# Patient Record
Sex: MALE | Race: WHITE | NOT HISPANIC OR LATINO | URBAN - METROPOLITAN AREA
[De-identification: names, ages, dates, MRNs, and addresses within clinical notes are randomized per-mention and may not be internally consistent; named-entity substitution may affect disease eponyms.]

---

## 2020-02-08 ENCOUNTER — INPATIENT (INPATIENT)
Facility: HOSPITAL | Age: 85
LOS: 6 days | Discharge: HOME | End: 2020-02-15
Attending: INTERNAL MEDICINE | Admitting: INTERNAL MEDICINE
Payer: MEDICARE

## 2020-02-08 VITALS
OXYGEN SATURATION: 100 % | TEMPERATURE: 96 F | HEART RATE: 114 BPM | SYSTOLIC BLOOD PRESSURE: 81 MMHG | DIASTOLIC BLOOD PRESSURE: 54 MMHG | HEIGHT: 70 IN | RESPIRATION RATE: 17 BRPM | WEIGHT: 190.04 LBS

## 2020-02-08 LAB
ALBUMIN SERPL ELPH-MCNC: 4.6 G/DL — SIGNIFICANT CHANGE UP (ref 3.5–5.2)
ALP SERPL-CCNC: 80 U/L — SIGNIFICANT CHANGE UP (ref 30–115)
ALT FLD-CCNC: 12 U/L — SIGNIFICANT CHANGE UP (ref 0–41)
ANION GAP SERPL CALC-SCNC: 15 MMOL/L — HIGH (ref 7–14)
APPEARANCE UR: CLEAR — SIGNIFICANT CHANGE UP
APTT BLD: 23.8 SEC — CRITICAL LOW (ref 27–39.2)
AST SERPL-CCNC: 18 U/L — SIGNIFICANT CHANGE UP (ref 0–41)
BASOPHILS # BLD AUTO: 0.02 K/UL — SIGNIFICANT CHANGE UP (ref 0–0.2)
BASOPHILS NFR BLD AUTO: 0.2 % — SIGNIFICANT CHANGE UP (ref 0–1)
BILIRUB SERPL-MCNC: 1.1 MG/DL — SIGNIFICANT CHANGE UP (ref 0.2–1.2)
BILIRUB UR-MCNC: NEGATIVE — SIGNIFICANT CHANGE UP
BUN SERPL-MCNC: 17 MG/DL — SIGNIFICANT CHANGE UP (ref 10–20)
CALCIUM SERPL-MCNC: 9.3 MG/DL — SIGNIFICANT CHANGE UP (ref 8.5–10.1)
CHLORIDE SERPL-SCNC: 104 MMOL/L — SIGNIFICANT CHANGE UP (ref 98–110)
CO2 SERPL-SCNC: 25 MMOL/L — SIGNIFICANT CHANGE UP (ref 17–32)
COLOR SPEC: YELLOW — SIGNIFICANT CHANGE UP
CREAT SERPL-MCNC: 1.1 MG/DL — SIGNIFICANT CHANGE UP (ref 0.7–1.5)
DIFF PNL FLD: NEGATIVE — SIGNIFICANT CHANGE UP
EOSINOPHIL # BLD AUTO: 0.02 K/UL — SIGNIFICANT CHANGE UP (ref 0–0.7)
EOSINOPHIL NFR BLD AUTO: 0.2 % — SIGNIFICANT CHANGE UP (ref 0–8)
FLU A RESULT: NEGATIVE — SIGNIFICANT CHANGE UP
FLU A RESULT: NEGATIVE — SIGNIFICANT CHANGE UP
FLUAV AG NPH QL: NEGATIVE — SIGNIFICANT CHANGE UP
FLUBV AG NPH QL: NEGATIVE — SIGNIFICANT CHANGE UP
GLUCOSE SERPL-MCNC: 166 MG/DL — HIGH (ref 70–99)
GLUCOSE UR QL: NEGATIVE MG/DL — SIGNIFICANT CHANGE UP
HCT VFR BLD CALC: 43.7 % — SIGNIFICANT CHANGE UP (ref 42–52)
HGB BLD-MCNC: 14.7 G/DL — SIGNIFICANT CHANGE UP (ref 14–18)
IMM GRANULOCYTES NFR BLD AUTO: 0.2 % — SIGNIFICANT CHANGE UP (ref 0.1–0.3)
INR BLD: 1.11 RATIO — SIGNIFICANT CHANGE UP (ref 0.65–1.3)
KETONES UR-MCNC: 15
LACTATE SERPL-SCNC: 2.2 MMOL/L — HIGH (ref 0.7–2)
LEUKOCYTE ESTERASE UR-ACNC: NEGATIVE — SIGNIFICANT CHANGE UP
LYMPHOCYTES # BLD AUTO: 1.22 K/UL — SIGNIFICANT CHANGE UP (ref 1.2–3.4)
LYMPHOCYTES # BLD AUTO: 11.6 % — LOW (ref 20.5–51.1)
MCHC RBC-ENTMCNC: 32.5 PG — HIGH (ref 27–31)
MCHC RBC-ENTMCNC: 33.6 G/DL — SIGNIFICANT CHANGE UP (ref 32–37)
MCV RBC AUTO: 96.5 FL — HIGH (ref 80–94)
MONOCYTES # BLD AUTO: 0.7 K/UL — HIGH (ref 0.1–0.6)
MONOCYTES NFR BLD AUTO: 6.7 % — SIGNIFICANT CHANGE UP (ref 1.7–9.3)
NEUTROPHILS # BLD AUTO: 8.52 K/UL — HIGH (ref 1.4–6.5)
NEUTROPHILS NFR BLD AUTO: 81.1 % — HIGH (ref 42.2–75.2)
NITRITE UR-MCNC: NEGATIVE — SIGNIFICANT CHANGE UP
NRBC # BLD: 0 /100 WBCS — SIGNIFICANT CHANGE UP (ref 0–0)
PH UR: 5.5 — SIGNIFICANT CHANGE UP (ref 5–8)
PLATELET # BLD AUTO: 152 K/UL — SIGNIFICANT CHANGE UP (ref 130–400)
POTASSIUM SERPL-MCNC: 3.8 MMOL/L — SIGNIFICANT CHANGE UP (ref 3.5–5)
POTASSIUM SERPL-SCNC: 3.8 MMOL/L — SIGNIFICANT CHANGE UP (ref 3.5–5)
PROT SERPL-MCNC: 7.2 G/DL — SIGNIFICANT CHANGE UP (ref 6–8)
PROT UR-MCNC: NEGATIVE MG/DL — SIGNIFICANT CHANGE UP
PROTHROM AB SERPL-ACNC: 12.8 SEC — SIGNIFICANT CHANGE UP (ref 9.95–12.87)
RBC # BLD: 4.53 M/UL — LOW (ref 4.7–6.1)
RBC # FLD: 12.6 % — SIGNIFICANT CHANGE UP (ref 11.5–14.5)
RSV RESULT: NEGATIVE — SIGNIFICANT CHANGE UP
RSV RNA RESP QL NAA+PROBE: NEGATIVE — SIGNIFICANT CHANGE UP
SODIUM SERPL-SCNC: 144 MMOL/L — SIGNIFICANT CHANGE UP (ref 135–146)
SP GR SPEC: 1.02 — SIGNIFICANT CHANGE UP (ref 1.01–1.03)
TROPONIN T SERPL-MCNC: <0.01 NG/ML — SIGNIFICANT CHANGE UP
UROBILINOGEN FLD QL: 1 MG/DL (ref 0.2–0.2)
WBC # BLD: 10.5 K/UL — SIGNIFICANT CHANGE UP (ref 4.8–10.8)
WBC # FLD AUTO: 10.5 K/UL — SIGNIFICANT CHANGE UP (ref 4.8–10.8)

## 2020-02-08 PROCEDURE — 99291 CRITICAL CARE FIRST HOUR: CPT

## 2020-02-08 RX ORDER — AZITHROMYCIN 500 MG/1
500 TABLET, FILM COATED ORAL ONCE
Refills: 0 | Status: COMPLETED | OUTPATIENT
Start: 2020-02-08 | End: 2020-02-08

## 2020-02-08 RX ORDER — CEFTRIAXONE 500 MG/1
1000 INJECTION, POWDER, FOR SOLUTION INTRAMUSCULAR; INTRAVENOUS ONCE
Refills: 0 | Status: COMPLETED | OUTPATIENT
Start: 2020-02-08 | End: 2020-02-08

## 2020-02-08 RX ORDER — ACETAMINOPHEN 500 MG
650 TABLET ORAL ONCE
Refills: 0 | Status: COMPLETED | OUTPATIENT
Start: 2020-02-08 | End: 2020-02-08

## 2020-02-08 RX ORDER — SODIUM CHLORIDE 9 MG/ML
2300 INJECTION, SOLUTION INTRAVENOUS ONCE
Refills: 0 | Status: COMPLETED | OUTPATIENT
Start: 2020-02-08 | End: 2020-02-08

## 2020-02-08 RX ORDER — SODIUM CHLORIDE 9 MG/ML
1000 INJECTION, SOLUTION INTRAVENOUS
Refills: 0 | Status: DISCONTINUED | OUTPATIENT
Start: 2020-02-08 | End: 2020-02-09

## 2020-02-08 RX ADMIN — Medication 650 MILLIGRAM(S): at 22:33

## 2020-02-08 RX ADMIN — SODIUM CHLORIDE 2300 MILLILITER(S): 9 INJECTION, SOLUTION INTRAVENOUS at 21:15

## 2020-02-08 RX ADMIN — AZITHROMYCIN 255 MILLIGRAM(S): 500 TABLET, FILM COATED ORAL at 22:57

## 2020-02-08 RX ADMIN — CEFTRIAXONE 1000 MILLIGRAM(S): 500 INJECTION, POWDER, FOR SOLUTION INTRAMUSCULAR; INTRAVENOUS at 22:42

## 2020-02-08 NOTE — ED PROVIDER NOTE - SEVERE SEPSIS ALERT DETAILS
Sepsis suspected at this time.   IVF bolus cannot be given due to: ( ) CHF ( ) CRF ( ) Hospice or comfort measures only ( ) Patient refusal

## 2020-02-08 NOTE — ED PROVIDER NOTE - PROGRESS NOTE DETAILS
Discussed with Dr. Goodman. Admit to low risk tele. Dr. Kingsley accepts admission. Dr Purvis evaluated patient, patient doesn't need ICU now. admit to low risk tele. patient has low CHAVAD score and doesn't recommend anticoagulation now. treat PNA with Abx and IV fluid. will re-evaluate patient if condition deteriorate noted on monitor patient's A.fib broke. ? elevation will order EKG EKG found STEMI. STEMI activated. case d/w Dr Duarte (Cardiology), accept to CCU. case d/w Dr Wagner Herbster hospitalist and aware of transfer and admission. Dr Kingsley at bedside and aware of the transfer and STEMI.

## 2020-02-08 NOTE — ED PROVIDER NOTE - NS ED ROS FT
Constitutional: + chills and lightheadedness. no fever, no recent weight loss, change in appetite or malaise  Eyes: no redness/discharge/pain/vision changes  ENT: no rhinorrhea/ear pain/sore throat  Cardiac: No chest pain, SOB or edema.  Respiratory: No cough or respiratory distress  GI: ?diarrhea No nausea, vomiting or abdominal pain.  : No dysuria, frequency, urgency or hematuria  MS: no pain to back or extremities, no loss of ROM, no weakness  Neuro: No headache or weakness. No LOC.  Skin: No skin rash.  Endocrine: No history of thyroid disease or diabetes.

## 2020-02-08 NOTE — CONSULT NOTE ADULT - SUBJECTIVE AND OBJECTIVE BOX
Patient is a 85y old  Male who presents with a chief complaint of     HPI: 85M with no PMH presents for new onset rigors. Patient lives in [place] and is fully functional and takes care of all ADL without restrictions. He states that he has been having a mild cough over the last few days. Denies chest pain, dyspnea, fever, abdominal discomfort or other constitutional symptoms. He does not use any medications. Has no cardiac history. Upon admission he was tachycardic and with rigors. He was given 2L LR in the ED and ABx Rocephin and Azithromycin.  85y (2020 23:00)      PAST MEDICAL & SURGICAL HISTORY:      SOCIAL HX:   Smoking     former smoker                    ETOH   denies                         Other    FAMILY HISTORY:  :  No known cardiovascular family history     Review Of Systems:     CONSTITUTIONAL:   no fever   + chills.  no weight gain   no weight loss    EYES:   no discharge,   no pain  no redness,   no visual changes.    ENT:   Ears: no ear pain and no hearing problems.  Nose: no nasal congestion and no nasal drainage.  Mouth/Throat: no dysphagia,  no hoarseness and no throat pain.  Neck: no lumps, no pain, no stiffness and no swollen glands.     CARDIOVASCULAR:   no chest pain,   no swelling  no palpitations  no syncope    RESPIRATORY:  no SOB,  no wheezing ,  no respiratory difficulty  no sputum production  + cough    GASTROINTESTINAL:   no abdominal pain,   no constipation,   no diarrhea,   no vomiting.    GENITOURINARY:  no dysuria,   no frequency,   no urgency  no hematuria.    MUSCULOSKELETAL:   no back pain,   no musculoskeletal pain,  no weakness.    SKIN:   no jaundice,   no lesions,   no pruritis,   no rashes.    NEURO:   no loss of consciousness,   no gait abnormality,   no headache,   no sensory deficits,   no weakness.    PSYCHIATRIC:   no known mental health issues  no anxiety  no depression    ALLERGIC/IMMUNOLOGIC:   No active allergic or immunologic issues      Allergies    No Known Allergies    Intolerances          PHYSICAL EXAM    ICU Vital Signs Last 24 Hrs  T(C): 37.4 (2020 22:13), Max: 37.4 (2020 22:13)  T(F): 99.3 (2020 22:13), Max: 99.3 (2020 22:13)  HR: 122 (2020 22:13) (114 - 122)  BP: 111/62 (2020 22:13) (81/54 - 111/62)  RR: 20 (2020 22:13) (17 - 20)  SpO2: 95% (2020 22:13) (95% - 100%)      CONSTITUTIONAL:   Ill appearing.  Well nourished.  NAD    ENT:   + ET   Airway patent,   Mouth with normal mucosa.   No thrush    EYES:   pupils equal,   round and reactive to light.    CARDIAC:   Tachycardic   Irregular rhythm.    Heart sounds S1, S2.   No edema      Vascular:   normal systolic impulse  no bruits    RESPIRATORY:   No wheezing   Normal chest expansion  No use of accessory muscles    GASTROINTESTINAL:  Abdomen soft   Non-tender,   No guarding,   + BS    GENITOURINARY  normal genitalia for sex  no edema    MUSCULOSKELETAL:   Range of motion is not limited,  No clubbing, cyanosis    NEUROLOGICAL:   Alert and oriented   No motor or sensory deficits.  Pertinent DTRs normal    SKIN:   Skin normal color for race,   Warm and dry  No evidence of rash.    PSYCHIATRIC:   Normal mood and affect.   No apparent risk to self or others.    HEME LYMPH:   No splenomegaly.  No cervical  lymphadenopathy.  No inguinal lymphadenopathy              LABS:                          14.7   10.50 )-----------( 152      ( 2020 21:13 )             43.7                                               02-08    144  |  104  |  17  ----------------------------<  166<H>  3.8   |  25  |  1.1    Ca    9.3      2020 21:13    TPro  7.2  /  Alb  4.6  /  TBili  1.1  /  DBili  x   /  AST  18  /  ALT  12  /  AlkPhos  80  02-08      PT/INR - ( 2020 21:13 )   PT: 12.80 sec;   INR: 1.11 ratio         PTT - ( 2020 21:13 )  PTT:23.8 sec                                       Urinalysis Basic - ( 2020 23:00 )    Color: Yellow / Appearance: Clear / S.025 / pH: x  Gluc: x / Ketone: 15  / Bili: Negative / Urobili: 1.0 mg/dL   Blood: x / Protein: Negative mg/dL / Nitrite: Negative   Leuk Esterase: Negative / RBC: x / WBC x   Sq Epi: x / Non Sq Epi: x / Bacteria: x        CARDIAC MARKERS ( 2020 23:00 )  x     / <0.01 ng/mL / x     / x     / x                                                LIVER FUNCTIONS - ( 2020 21:13 )  Alb: 4.6 g/dL / Pro: 7.2 g/dL / ALK PHOS: 80 U/L / ALT: 12 U/L / AST: 18 U/L / GGT: x                                                                                                                                       X-Rays reviewed:                                                                                    ECHO    CXR interpreted by me: RLL opacity    MEDICATIONS  (STANDING):  lactated ringers. 1000 milliLiter(s) (2000 mL/Hr) IV Continuous <Continuous>    MEDICATIONS  (PRN):

## 2020-02-08 NOTE — ED ADULT NURSE NOTE - NSIMPLEMENTINTERV_GEN_ALL_ED
Implemented All Fall Risk Interventions:  [place] to call system. Call bell, personal items and telephone within reach. Instruct patient to call for assistance. Room bathroom lighting operational. Non-slip footwear when patient is off stretcher. Physically safe environment: no spills, clutter or unnecessary equipment. Stretcher in lowest position, wheels locked, appropriate side rails in place. Provide visual cue, wrist band, yellow gown, etc. Monitor gait and stability. Monitor for mental status changes and reorient to person, place, and time. Review medications for side effects contributing to fall risk. Reinforce activity limits and safety measures with patient and family.

## 2020-02-08 NOTE — ED PROVIDER NOTE - CLINICAL SUMMARY MEDICAL DECISION MAKING FREE TEXT BOX
Labs unremarkable. CXR RLL infiltrate. EKG afib with RVR, no acute changes. Given IVF, Rocephin and Zofran. Will admit to low risk tele.

## 2020-02-08 NOTE — H&P ADULT - HISTORY OF PRESENT ILLNESS
16p 86yo male traveling from [place] presented to ER with chill. In the ER found to have rapid atrial fibrillationalong with a pneumonia 86yo male traveling from [place] presented to ER with chill. In the ER found to have rapid atrial fibrillationalong with a pneumonia. No chest pain 86yo male traveling from [place] presented to ER with chill. In the ER found to have rapid atrial fibrillationalong. No chest pain

## 2020-02-08 NOTE — ED PROVIDER NOTE - OBJECTIVE STATEMENT
84 yo male no sig hx present c/o "shaking chills" x 1 hour started around dinner time. as per patient and son, patient was doing well earlier today. They were out of dinner and patient started complaining chills and lightheaded. Denies LOC/chest pain/sob/abd pain/n/v/HA and dizziness. In ED, patient reported he feels like he has to go for diarrhea. patient denies coughing/sob/sore throat/ear pain and runny nose.

## 2020-02-08 NOTE — ED PROVIDER NOTE - ATTENDING CONTRIBUTION TO CARE
I personally evaluated the patient. I reviewed the Resident’s or Physician Assistant’s note (as assigned above), and agree with the findings and plan except as documented in my note.  Chart reviewed. 84 y/o WM brought in for shaking chills and nausea. No rash or headache. Exam shows alert patient in no distress, HEENT NCAT, neck supple, lungs clear, RR S1S2, abdomen soft Nt +BS, no CCE, no rash, neuro A&OX3 no deficits.

## 2020-02-08 NOTE — CONSULT NOTE ADULT - ASSESSMENT
IMPRESSION:    Sepsis   PNA  Afib with RVR - resolved  r/o bacteremia      PLAN:    CNS: Avoid CNS depressants    HEENT: Oral care    PULMONARY:  HOB @ 45 degrees. Maintain SpO2 > 92%    CARDIOVASCULAR: Cardiology consult. F/u CE, Check 2D echo    GI: GI prophylaxis.  Feeding as tolerated. Consider speech and swallow evaluation    RENAL:  Follow up lytes.  Correct as needed    INFECTIOUS DISEASE: Follow up cultures. Continue rocephin, azithromycin    HEMATOLOGICAL:  DVT prophylaxis.    ENDOCRINE:  Follow up FS.  Insulin protocol if needed.    MUSCULOSKELETAL: OOB as tolerated    Patient does not require continuous monitoring in the ICU at this time. Would recommend admission to telemetry. Recall as needed.

## 2020-02-08 NOTE — ED PROVIDER NOTE - CARE PLAN
Principal Discharge DX:	Pneumonia Principal Discharge DX:	Pneumonia  Secondary Diagnosis:	Atrial fibrillation with rapid ventricular response

## 2020-02-08 NOTE — H&P ADULT - NSHPLABSRESULTS_GEN_ALL_CORE
14.7   10.50 )-----------( 152      ( 2020 21:13 )             43.7         144  |  104  |  17  ----------------------------<  166<H>  3.8   |  25  |  1.1    Ca    9.3      2020 21:13    TPro  7.2  /  Alb  4.6  /  TBili  1.1  /  DBili  x   /  AST  18  /  ALT  12  /  AlkPhos  80  02-08          Urinalysis Basic - ( 2020 23:00 )    Color: Yellow / Appearance: Clear / S.025 / pH: x  Gluc: x / Ketone: 15  / Bili: Negative / Urobili: 1.0 mg/dL   Blood: x / Protein: Negative mg/dL / Nitrite: Negative   Leuk Esterase: Negative / RBC: x / WBC x   Sq Epi: x / Non Sq Epi: x / Bacteria: x      PT/INR - ( 2020 21:13 )   PT: 12.80 sec;   INR: 1.11 ratio         PTT - ( 2020 21:13 )  PTT:23.8 sec  Lactate Trend   @ 21:13 Lactate:2.2     CARDIAC MARKERS ( 2020 23:00 )  x     / <0.01 ng/mL / x     / x     / x          CAPILLARY BLOOD GLUCOSE      EKG- rapid a fib    CXR per ER RLL pnumonia

## 2020-02-09 ENCOUNTER — EMERGENCY (EMERGENCY)
Facility: HOSPITAL | Age: 85
LOS: 0 days | Discharge: HOME | End: 2020-02-09
Admitting: EMERGENCY MEDICINE
Payer: COMMERCIAL

## 2020-02-09 DIAGNOSIS — I21.9 ACUTE MYOCARDIAL INFARCTION, UNSPECIFIED: ICD-10-CM

## 2020-02-09 DIAGNOSIS — J18.9 PNEUMONIA, UNSPECIFIED ORGANISM: ICD-10-CM

## 2020-02-09 DIAGNOSIS — I48.91 UNSPECIFIED ATRIAL FIBRILLATION: ICD-10-CM

## 2020-02-09 LAB
ANION GAP SERPL CALC-SCNC: 13 MMOL/L — SIGNIFICANT CHANGE UP (ref 7–14)
BASE EXCESS BLDV CALC-SCNC: -0.5 MMOL/L — SIGNIFICANT CHANGE UP (ref -2–2)
BASOPHILS # BLD AUTO: 0.01 K/UL — SIGNIFICANT CHANGE UP (ref 0–0.2)
BASOPHILS NFR BLD AUTO: 0.1 % — SIGNIFICANT CHANGE UP (ref 0–1)
BUN SERPL-MCNC: 15 MG/DL — SIGNIFICANT CHANGE UP (ref 10–20)
CA-I SERPL-SCNC: 1.16 MMOL/L — SIGNIFICANT CHANGE UP (ref 1.12–1.3)
CALCIUM SERPL-MCNC: 8.6 MG/DL — SIGNIFICANT CHANGE UP (ref 8.5–10.1)
CHLORIDE SERPL-SCNC: 104 MMOL/L — SIGNIFICANT CHANGE UP (ref 98–110)
CHOLEST SERPL-MCNC: 119 MG/DL — SIGNIFICANT CHANGE UP (ref 100–200)
CK MB CFR SERPL CALC: 198.8 NG/ML — HIGH (ref 0.6–6.3)
CK SERPL-CCNC: 1547 U/L — HIGH (ref 0–225)
CO2 SERPL-SCNC: 25 MMOL/L — SIGNIFICANT CHANGE UP (ref 17–32)
CREAT SERPL-MCNC: 1 MG/DL — SIGNIFICANT CHANGE UP (ref 0.7–1.5)
EOSINOPHIL # BLD AUTO: 0 K/UL — SIGNIFICANT CHANGE UP (ref 0–0.7)
EOSINOPHIL NFR BLD AUTO: 0 % — SIGNIFICANT CHANGE UP (ref 0–8)
GAS PNL BLDV: 141 MMOL/L — SIGNIFICANT CHANGE UP (ref 136–145)
GAS PNL BLDV: SIGNIFICANT CHANGE UP
GLUCOSE SERPL-MCNC: 131 MG/DL — HIGH (ref 70–99)
HCO3 BLDV-SCNC: 23 MMOL/L — SIGNIFICANT CHANGE UP (ref 22–29)
HCT VFR BLD CALC: 38.6 % — LOW (ref 42–52)
HCT VFR BLDA CALC: 63.3 % — HIGH (ref 34–44)
HDLC SERPL-MCNC: 39 MG/DL — LOW
HGB BLD CALC-MCNC: 20.7 G/DL — CRITICAL HIGH (ref 14–18)
HGB BLD-MCNC: 12.7 G/DL — LOW (ref 14–18)
HOROWITZ INDEX BLDV+IHG-RTO: 21 — SIGNIFICANT CHANGE UP
IMM GRANULOCYTES NFR BLD AUTO: 0.3 % — SIGNIFICANT CHANGE UP (ref 0.1–0.3)
LACTATE BLDV-MCNC: 3.6 MMOL/L — HIGH (ref 0.5–1.6)
LIPID PNL WITH DIRECT LDL SERPL: 75 MG/DL — SIGNIFICANT CHANGE UP (ref 4–129)
LYMPHOCYTES # BLD AUTO: 1.76 K/UL — SIGNIFICANT CHANGE UP (ref 1.2–3.4)
LYMPHOCYTES # BLD AUTO: 15.1 % — LOW (ref 20.5–51.1)
MAGNESIUM SERPL-MCNC: 1.9 MG/DL — SIGNIFICANT CHANGE UP (ref 1.8–2.4)
MCHC RBC-ENTMCNC: 32.3 PG — HIGH (ref 27–31)
MCHC RBC-ENTMCNC: 32.9 G/DL — SIGNIFICANT CHANGE UP (ref 32–37)
MCV RBC AUTO: 98.2 FL — HIGH (ref 80–94)
MONOCYTES # BLD AUTO: 0.73 K/UL — HIGH (ref 0.1–0.6)
MONOCYTES NFR BLD AUTO: 6.3 % — SIGNIFICANT CHANGE UP (ref 1.7–9.3)
NEUTROPHILS # BLD AUTO: 9.12 K/UL — HIGH (ref 1.4–6.5)
NEUTROPHILS NFR BLD AUTO: 78.2 % — HIGH (ref 42.2–75.2)
NRBC # BLD: 0 /100 WBCS — SIGNIFICANT CHANGE UP (ref 0–0)
NT-PROBNP SERPL-SCNC: 444 PG/ML — HIGH (ref 0–300)
PCO2 BLDV: 36 MMHG — LOW (ref 41–51)
PH BLDV: 7.42 — SIGNIFICANT CHANGE UP (ref 7.26–7.43)
PLATELET # BLD AUTO: 115 K/UL — LOW (ref 130–400)
PO2 BLDV: 41 MMHG — HIGH (ref 20–40)
POTASSIUM BLDV-SCNC: 3.5 MMOL/L — SIGNIFICANT CHANGE UP (ref 3.3–5.6)
POTASSIUM SERPL-MCNC: 3.7 MMOL/L — SIGNIFICANT CHANGE UP (ref 3.5–5)
POTASSIUM SERPL-SCNC: 3.7 MMOL/L — SIGNIFICANT CHANGE UP (ref 3.5–5)
RBC # BLD: 3.93 M/UL — LOW (ref 4.7–6.1)
RBC # FLD: 12.7 % — SIGNIFICANT CHANGE UP (ref 11.5–14.5)
SAO2 % BLDV: 77 % — SIGNIFICANT CHANGE UP
SODIUM SERPL-SCNC: 142 MMOL/L — SIGNIFICANT CHANGE UP (ref 135–146)
TOTAL CHOLESTEROL/HDL RATIO MEASUREMENT: 3.1 RATIO — LOW (ref 4–5.5)
TRIGL SERPL-MCNC: 59 MG/DL — SIGNIFICANT CHANGE UP (ref 10–149)
TROPONIN T SERPL-MCNC: 0.06 NG/ML — CRITICAL HIGH
TROPONIN T SERPL-MCNC: 2.5 NG/ML — CRITICAL HIGH
WBC # BLD: 11.66 K/UL — HIGH (ref 4.8–10.8)
WBC # FLD AUTO: 11.66 K/UL — HIGH (ref 4.8–10.8)

## 2020-02-09 PROCEDURE — 99222 1ST HOSP IP/OBS MODERATE 55: CPT

## 2020-02-09 PROCEDURE — 93306 TTE W/DOPPLER COMPLETE: CPT | Mod: 26

## 2020-02-09 PROCEDURE — L9996: CPT

## 2020-02-09 PROCEDURE — 93010 ELECTROCARDIOGRAM REPORT: CPT

## 2020-02-09 PROCEDURE — 99231 SBSQ HOSP IP/OBS SF/LOW 25: CPT

## 2020-02-09 PROCEDURE — 71045 X-RAY EXAM CHEST 1 VIEW: CPT | Mod: 26

## 2020-02-09 RX ORDER — ASPIRIN/CALCIUM CARB/MAGNESIUM 324 MG
364 TABLET ORAL ONCE
Refills: 0 | Status: DISCONTINUED | OUTPATIENT
Start: 2020-02-09 | End: 2020-02-09

## 2020-02-09 RX ORDER — ASPIRIN/CALCIUM CARB/MAGNESIUM 324 MG
81 TABLET ORAL DAILY
Refills: 0 | Status: DISCONTINUED | OUTPATIENT
Start: 2020-02-09 | End: 2020-02-15

## 2020-02-09 RX ORDER — ASPIRIN/CALCIUM CARB/MAGNESIUM 324 MG
324 TABLET ORAL ONCE
Refills: 0 | Status: COMPLETED | OUTPATIENT
Start: 2020-02-09 | End: 2020-02-09

## 2020-02-09 RX ORDER — INFLUENZA VIRUS VACCINE 15; 15; 15; 15 UG/.5ML; UG/.5ML; UG/.5ML; UG/.5ML
0.5 SUSPENSION INTRAMUSCULAR ONCE
Refills: 0 | Status: DISCONTINUED | OUTPATIENT
Start: 2020-02-09 | End: 2020-02-09

## 2020-02-09 RX ORDER — SODIUM CHLORIDE 9 MG/ML
1000 INJECTION INTRAMUSCULAR; INTRAVENOUS; SUBCUTANEOUS
Refills: 0 | Status: DISCONTINUED | OUTPATIENT
Start: 2020-02-09 | End: 2020-02-14

## 2020-02-09 RX ORDER — AZITHROMYCIN 500 MG/1
500 TABLET, FILM COATED ORAL EVERY 24 HOURS
Refills: 0 | Status: DISCONTINUED | OUTPATIENT
Start: 2020-02-09 | End: 2020-02-09

## 2020-02-09 RX ORDER — CEFTRIAXONE 500 MG/1
1000 INJECTION, POWDER, FOR SOLUTION INTRAMUSCULAR; INTRAVENOUS EVERY 24 HOURS
Refills: 0 | Status: DISCONTINUED | OUTPATIENT
Start: 2020-02-09 | End: 2020-02-09

## 2020-02-09 RX ORDER — CLOPIDOGREL BISULFATE 75 MG/1
300 TABLET, FILM COATED ORAL ONCE
Refills: 0 | Status: COMPLETED | OUTPATIENT
Start: 2020-02-09 | End: 2020-02-09

## 2020-02-09 RX ORDER — CHLORHEXIDINE GLUCONATE 213 G/1000ML
1 SOLUTION TOPICAL
Refills: 0 | Status: DISCONTINUED | OUTPATIENT
Start: 2020-02-09 | End: 2020-02-15

## 2020-02-09 RX ORDER — ATORVASTATIN CALCIUM 80 MG/1
80 TABLET, FILM COATED ORAL AT BEDTIME
Refills: 0 | Status: DISCONTINUED | OUTPATIENT
Start: 2020-02-09 | End: 2020-02-15

## 2020-02-09 RX ORDER — CLOPIDOGREL BISULFATE 75 MG/1
75 TABLET, FILM COATED ORAL DAILY
Refills: 0 | Status: DISCONTINUED | OUTPATIENT
Start: 2020-02-09 | End: 2020-02-15

## 2020-02-09 RX ADMIN — ATORVASTATIN CALCIUM 80 MILLIGRAM(S): 80 TABLET, FILM COATED ORAL at 06:37

## 2020-02-09 RX ADMIN — CHLORHEXIDINE GLUCONATE 1 APPLICATION(S): 213 SOLUTION TOPICAL at 06:39

## 2020-02-09 RX ADMIN — SODIUM CHLORIDE 2000 MILLILITER(S): 9 INJECTION, SOLUTION INTRAVENOUS at 00:40

## 2020-02-09 RX ADMIN — Medication 324 MILLIGRAM(S): at 02:21

## 2020-02-09 RX ADMIN — SODIUM CHLORIDE 50 MILLILITER(S): 9 INJECTION INTRAMUSCULAR; INTRAVENOUS; SUBCUTANEOUS at 04:23

## 2020-02-09 RX ADMIN — CLOPIDOGREL BISULFATE 300 MILLIGRAM(S): 75 TABLET, FILM COATED ORAL at 02:21

## 2020-02-09 RX ADMIN — CLOPIDOGREL BISULFATE 75 MILLIGRAM(S): 75 TABLET, FILM COATED ORAL at 11:55

## 2020-02-09 RX ADMIN — Medication 650 MILLIGRAM(S): at 01:00

## 2020-02-09 RX ADMIN — Medication 81 MILLIGRAM(S): at 11:55

## 2020-02-09 RX ADMIN — ATORVASTATIN CALCIUM 80 MILLIGRAM(S): 80 TABLET, FILM COATED ORAL at 21:37

## 2020-02-09 NOTE — CONSULT NOTE ADULT - SUBJECTIVE AND OBJECTIVE BOX
Date of Admission: 02-09-20    CHIEF COMPLAINT: Patient is a 85y old  Male who presents with a chief complaint of     HPI:  86 y/o M with no PMH presents for new onset rigors. Patient lives in [place] and is fully functional and takes care of all ADL without restrictions. He states that he has been having a mild cough over the last few days. Denies chest pain, dyspnea, fever, abdominal discomfort or other constitutional symptoms. He does not use any medications. Has no cardiac history. Upon admission he was tachycardic and with rigors. He was given 2L LR in the ED and ABx Rocephin and Azithromycin.       PAST MEDICAL & SURGICAL HISTORY:  No pertinent past medical history  No significant past surgical history      FAMILY HISTORY:  [x] no pertinent family history of premature cardiovascular disease in first degree relatives.    SOCIAL HISTORY:    [x] Non-smoker  [x] No alcohol use  [x] No illicit drug use    Allergies: No Known Allergies    REVIEW OF SYSTEMS:  CONSTITUTIONAL: No fever, weight loss, or fatigue. (+) Rigors.  CARDIOLOGY: No chest pain, dyspnea of exertion, or syncopal episodes.   RESPIRATORY: (+) Cough. No shortness of breath, cough, wheezing.   NEUROLOGICAL: No weakness, no focal deficits to report.  GI: No BRBPR, no N,V,diarrhea.    PSYCHIATRY: Normal mood and affect.  HEENT: No nasal discharge, no ecchymosis  SKIN: No ecchymosis, no breakdown  MUSCULOSKELETAL: Full range of motion x4.   EXTREM: No leg swelling or erythema.    PHYSICAL EXAM:  T(C): 37.4 (02-08-20 @ 22:13), Max: 37.4 (02-08-20 @ 22:13)  HR: 122 (02-08-20 @ 22:13) (114 - 122)  BP: 111/62 (02-08-20 @ 22:13) (81/54 - 111/62)  RR: 20 (02-08-20 @ 22:13) (17 - 20)  SpO2: 95% (02-08-20 @ 22:13) (95% - 100%)  Wt(kg): --  I&O's Summary      General Appearance: Well appearing, normal for age and gender. 	  Neck: Normal JVP, no bruit.   Eyes: No xanthomalasia, Extra Ocular muscles intact.   Cardiovascular: Regular rate and rhythm S1 S2, No JVD, No murmurs.  Respiratory: Lungs clear to auscultation. No wheezes, rales or rhonchi.  Psychiatry: Alert and oriented x 3, Mood & affect appropriate  Gastrointestinal:  Soft, Non-tender  Skin/Integumen: No rashes, No ecchymoses, No cyanosis	  Neurologic: Non-focal deficits.  Musculoskeletal/ extremities: Normal range of motion, No clubbing, cyanosis or edema  Vascular: Peripheral pulses palpable 2+ bilaterally    LABS:	 	                        14.7   10.50 )-----------( 152      ( 08 Feb 2020 21:13 )             43.7     02-08    144  |  104  |  17  ----------------------------<  166<H>  3.8   |  25  |  1.1    Ca    9.3      08 Feb 2020 21:13    TPro  7.2  /  Alb  4.6  /  TBili  1.1  /  DBili  x   /  AST  18  /  ALT  12  /  AlkPhos  80  02-08    CARDIAC MARKERS ( 08 Feb 2020 23:00 )  x     / <0.01 ng/mL / x     / x     / x          PT/INR - ( 08 Feb 2020 21:13 )   PT: 12.80 sec;   INR: 1.11 ratio    PTT - ( 08 Feb 2020 21:13 )  PTT:23.8 sec      TELEMETRY EVENTS: 	    ECG:  	  RADIOLOGY:  OTHER: 	    PREVIOUS DIAGNOSTIC TESTING:    [ ] Echocardiogram:   [ ] Catheterization:  [ ] Stress Test:  	  	  Home Medications: None    MEDICATIONS  (STANDING):  aspirin  chewable 324 milliGRAM(s) Oral once  azithromycin  IVPB 500 milliGRAM(s) IV Intermittent every 24 hours  cefTRIAXone   IVPB 1000 milliGRAM(s) IV Intermittent every 24 hours  clopidogrel Tablet 300 milliGRAM(s) Oral once  influenza   Vaccine 0.5 milliLiter(s) IntraMuscular once    MEDICATIONS  (PRN): Date of Admission: 02-09-20    CHIEF COMPLAINT: Patient is a 85y old  Male who presents with a chief complaint of rigors and lightheadedness.    HPI:  86 y/o M with PMH of pancreatic and liver cancer in remission since 40 years ago presented initially for new onset rigors an hour prior to presentation associated with lightheadedness and cough. Patient lives in [place] and is fully functional and takes care of all ADL without restrictions. Patient was initially treated for pneumonia and new onset with RVR. Pt converted back to sinus without intervention. Repeat EKG is done showing acute inferior STEMI. Pt was brought to [place] for further evaluation and emergent cardiac cath.      PAST MEDICAL & SURGICAL HISTORY:  Pancreatic/liver cancer  Abdominal surgery    FAMILY HISTORY:  [x] no pertinent family history of premature cardiovascular disease in first degree relatives.    SOCIAL HISTORY:    [x] Ex-smoker  [x] No alcohol use  [x] No illicit drug use    Allergies: No Known Allergies    REVIEW OF SYSTEMS:  CONSTITUTIONAL: No fever, weight loss, or fatigue. (+) Rigors.  CARDIOLOGY: No chest pain, dyspnea of exertion, or syncopal episodes.   RESPIRATORY: (+) Cough. No shortness of breath, cough, wheezing.   NEUROLOGICAL: No weakness, no focal deficits to report. (+) Lightheadedness  GI: No BRBPR, no N,V,diarrhea.    PSYCHIATRY: Normal mood and affect.  HEENT: No nasal discharge, no ecchymosis  SKIN: No ecchymosis, no breakdown  MUSCULOSKELETAL: Full range of motion x4.   EXTREM: No leg swelling or erythema.    PHYSICAL EXAM:  T(C): 37.4 (02-08-20 @ 22:13), Max: 37.4 (02-08-20 @ 22:13)  HR: 122 (02-08-20 @ 22:13) (114 - 122)  BP: 111/62 (02-08-20 @ 22:13) (81/54 - 111/62)  RR: 20 (02-08-20 @ 22:13) (17 - 20)  SpO2: 95% (02-08-20 @ 22:13) (95% - 100%)  Wt(kg): --  I&O's Summary      General Appearance: NAD normal for age and gender. 	  Neck: Normal JVP, no bruit.   Eyes: No xanthomalasia, Extra Ocular muscles intact.   Cardiovascular: Bradycardic, regular rhythm S1 S2, No JVD, No murmurs.  Respiratory: Bibasilar crackles.  Psychiatry: Alert and oriented x 3, Mood & affect appropriate  Gastrointestinal:  Soft, Non-tender  Skin/Integumen: No rashes, No ecchymoses, No cyanosis	  Neurologic: Non-focal deficits.  Musculoskeletal/ extremities: Normal range of motion, No clubbing, cyanosis or edema  Vascular: Peripheral pulses palpable bilaterally    LABS:	 	                        14.7   10.50 )-----------( 152      ( 08 Feb 2020 21:13 )             43.7     02-08    144  |  104  |  17  ----------------------------<  166<H>  3.8   |  25  |  1.1    Ca    9.3      08 Feb 2020 21:13    TPro  7.2  /  Alb  4.6  /  TBili  1.1  /  DBili  x   /  AST  18  /  ALT  12  /  AlkPhos  80  02-08    CARDIAC MARKERS ( 08 Feb 2020 23:00 )  x     / <0.01 ng/mL / x     / x     / x          PT/INR - ( 08 Feb 2020 21:13 )   PT: 12.80 sec;   INR: 1.11 ratio    PTT - ( 08 Feb 2020 21:13 )  PTT:23.8 sec      TELEMETRY EVENTS: 	    ECG: Sinus with inferior acute STEMI	  RADIOLOGY:  OTHER: 	    PREVIOUS DIAGNOSTIC TESTING:    [ ] Echocardiogram:   [ ] Catheterization:  [ ] Stress Test:  	  	  Home Medications: None    MEDICATIONS  (STANDING):  aspirin  chewable 324 milliGRAM(s) Oral once  azithromycin  IVPB 500 milliGRAM(s) IV Intermittent every 24 hours  cefTRIAXone   IVPB 1000 milliGRAM(s) IV Intermittent every 24 hours  clopidogrel Tablet 300 milliGRAM(s) Oral once  influenza   Vaccine 0.5 milliLiter(s) IntraMuscular once    MEDICATIONS  (PRN):

## 2020-02-09 NOTE — CONSULT NOTE ADULT - ASSESSMENT
Assessment:      Plan: Assessment:  Inferior STEMI 2/2 a complete distal RCA occlusion s/p PCI+stent  New onset Afib  Cough and SOB 2/2 CHF exacerbation    Plan:  CCU monitoring  c/w aspirin, plavix, statin  hold beta-blocker for bradycardia and hypotension  hemodynamic support  rule out underlying infection, panculture  obtain 2D Echo  obtain lipid profile and [id number]

## 2020-02-09 NOTE — CHART NOTE - NSCHARTNOTEFT_GEN_A_CORE
PRE-OP DIAGNOSIS: Inferior STEMI    PROCEDURE: [facility] with coronary angiography    Physician: Dr. Rouse  Assistant: Gerald    ANESTHESIA TYPE:  [  ] General Anesthesia  [x] Sedation  [x] Local/Regional    ESTIMATED BLOOD LOSS:     10 mL    CONDITION  [  ] Critical  [x] Serious  [  ] Fair  [  ] Good    SPECIMENS REMOVED (IF APPLICABLE): N/A      IV CONTRAST:   150  mL      IMPLANTS (IF APPLICABLE)      FINDINGS    LEFT HEART CATHETERIZATION                                    Left Main: distal LM 40% tubular lesion    LAD: prox LAD moderate disease with aneurysm, mid LAD 80% tubular stenosis at origin of D1, distal LAD luminal irregularities  mid LAD 80% lesion, aneurysm                      Diag: luminal irregularities    Left Circumflex: prox circ moderate disease with aneurysmal tubular mid segment, mid and distal circ no disease   OM:    Right Coronary Artery: prox RCA mild disease, mid RCA moderate disease, distal % occlusion ZEFERINO 0 flow  RPDA: possible thrombus/significant stenosis at the branch point of RPDA from distal RCA ZEFERINO 3 flow, rest of vessel no disease  RPL: no disease    Ramus Intermed: luminal irregularities    DOMINANCE: Right    ACCESS: Right femoral artery  CLOSURE: Perclose     INTERVENTION  IMPLANTS: Synergy 3.0x12mm to distal RCA      POST-OP DIAGNOSIS  LM disease, 3V disease      PLAN OF CARE  [x] Admit to CCU  [x] Continue DAPT & Statin therapy  [x] Will need staged procedure to LAD lesion PRE-OP DIAGNOSIS: Inferior STEMI    PROCEDURE: [facility] with coronary angiography    Physician: Dr. Rouse  Assistant: Gerald    ANESTHESIA TYPE:  [  ] General Anesthesia  [x] Sedation  [x] Local/Regional    ESTIMATED BLOOD LOSS:     10 mL    CONDITION  [  ] Critical  [x] Serious  [  ] Fair  [  ] Good    SPECIMENS REMOVED (IF APPLICABLE): N/A      IV CONTRAST:   150  mL      IMPLANTS (IF APPLICABLE)      FINDINGS    LEFT HEART CATHETERIZATION                                    Left Main: distal LM 40% tubular lesion    LAD: 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, mid LAD 80% tubular stenosis at origin of D1, distal LAD luminal irregularities                 Diag: D1 luminal irregularities    Left 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, mid circ no disease, distal circ no disease     Right Coronary Artery: prox RCA mild disease, mid RCA moderate disease, distal % occlusion ZEFERINO 0 flow    seen after achieving reperfusion  RPDA: thrombus/significant stenosis 95% at the branch point of RPDA from distal RCA ZEFERINO 3 flow, rest of vessel no disease  RPL: no disease    Ramus Intermed: no disease    DOMINANCE: Right    ACCESS: Right femoral artery  CLOSURE: Perclose     INTERVENTION  IMPLANTS: Synergy 3.0x12mm to distal % lesion    POST-OP DIAGNOSIS  LM disease, 2V disease (RCA and LAD)      PLAN OF CARE  [x] Admit to CCU  [x] Continue DAPT & Statin therapy  [x] Will need staged procedure to LAD lesion

## 2020-02-09 NOTE — CHART NOTE - NSCHARTNOTEFT_GEN_A_CORE
Patient converted to NSR but inferior wall ST elevation seen. STEMI code called and cardiology fellow contacted. Given aspirin (324) and Plavix (300) and patient is being transferred to [facility] CCU. Denies chest pain

## 2020-02-09 NOTE — PROGRESS NOTE ADULT - ASSESSMENT
# Inferior STEMI 2/2 a complete distal RCA occlusion s/p PCI+stent  New onset Afib  Acute CHF exacerbation    Plan:  cont aspirin, plavix, statin  hold beta-blocker for bradycardia and hypotension  f/u Echo  plan as per CCU team

## 2020-02-09 NOTE — PROGRESS NOTE ADULT - SUBJECTIVE AND OBJECTIVE BOX
SUBJECTIVE:  Patient was undergoing bedside echocardiogram when see today in AM.    *********************** VITALS ******************************************  Vital Signs Last 24 Hrs  T(C): 36.1 (09 Feb 2020 04:30), Max: 37.4 (08 Feb 2020 22:13)  T(F): 97 (09 Feb 2020 04:30), Max: 99.3 (08 Feb 2020 22:13)  HR: 80 (09 Feb 2020 08:00) (66 - 122)  BP: 98/55 (09 Feb 2020 08:00) (81/54 - 111/62)  BP(mean): 64 (09 Feb 2020 08:00) (62 - 82)  RR: 23 (09 Feb 2020 08:00) (17 - 41)  SpO2: 98% (09 Feb 2020 08:00) (95% - 100%)    ******************************** PHYSICAL EXAM:**************************************************  GENERAL:  NAD     PSYCH: no agitation     HEENT:   EOMI     NERVOUS SYSTEM:  Alert & Oriented X3     PULMONARY:  patient is breathing comfortably    CARDIOVASCULAR:  RRR, S1 S2 audible      ABDOMEN: Soft, NT, ND     EXTREMITIES:  warm          LABS:                        14.7   10.50 )-----------( 152      ( 08 Feb 2020 21:13 )             43.7       02-08    144  |  104  |  17  ----------------------------<  166<H>  3.8   |  25  |  1.1    Ca    9.3      08 Feb 2020 21:13    TPro  7.2  /  Alb  4.6  /  TBili  1.1  /  DBili  x   /  AST  18  /  ALT  12  /  AlkPhos  80  02-08      LIVER FUNCTIONS - ( 08 Feb 2020 21:13 )  Alb: 4.6 g/dL / Pro: 7.2 g/dL / ALK PHOS: 80 U/L / ALT: 12 U/L / AST: 18 U/L / GGT: x

## 2020-02-09 NOTE — CHART NOTE - NSCHARTNOTEFT_GEN_A_CORE
STEMI code called at [facility] which I immediately responded to.  EKG reviewed with interventional cardiologist on call.     As per ED team at [facility], pt is 85 M with no previous PMH presented with rigors and cough, found to be afib with RVR and treated with antibiotics for possible pneumonia. EKG repeated after return of sinus rhythm showed acute ST elevation in inferior leads with lateral changes.     Pt was recommended to be brought over to [place] ED immediately for further evaluation and possible cardiac catheterization.    As per ED team, pt was admitted to medical service, therefore, cannot be transferred under their care due to internal violation. Therefore, pt has to be upgraded to CCU as per request and will be transferred to [place] ED as CCU hold patient for further evaluation. STEMI code called at [facility] which I immediately responded to.  EKG reviewed with interventional cardiologist on call.     As per ED team at [facility], pt is 85 M with no previous PMH presented with rigors and cough, found to be afib with RVR and treated with antibiotics for possible pneumonia. EKG repeated after return of sinus rhythm showed acute ST elevation in inferior leads with lateral changes. As per ED team, pt does not complain of chest pain.    Pt was recommended to be brought over to [place] ED immediately for further evaluation and possible cardiac catheterization.    As per ED team, pt was admitted to medical service, therefore, cannot be transferred under their care due to internal violation. Therefore, pt has to be upgraded to CCU as per request and will be transferred to [place] ED as CCU hold patient for further evaluation.

## 2020-02-09 NOTE — PROVIDER CONTACT NOTE (CHANGE IN STATUS NOTIFICATION) - SITUATION
pt admitted with PNA and rapid afib new onset. soft telemetry hold in ed. son at bedside. as per monitor pt converted from afib to sinus with st elevation, stat 12 lead ekg performed. code stemi called by YAMINI Sanchez. MD Kingsley at bedside. labs drawn and pending results. full asa and plavix 300mg given. report given to ed rn marian. notified [facility]. ems at bedside, transfer in progress.

## 2020-02-10 LAB
ALBUMIN SERPL ELPH-MCNC: 3.2 G/DL — LOW (ref 3.5–5.2)
ALP SERPL-CCNC: 54 U/L — SIGNIFICANT CHANGE UP (ref 30–115)
ALT FLD-CCNC: 46 U/L — HIGH (ref 0–41)
ANION GAP SERPL CALC-SCNC: 10 MMOL/L — SIGNIFICANT CHANGE UP (ref 7–14)
AST SERPL-CCNC: 165 U/L — HIGH (ref 0–41)
BASOPHILS # BLD AUTO: 0.01 K/UL — SIGNIFICANT CHANGE UP (ref 0–0.2)
BASOPHILS NFR BLD AUTO: 0.1 % — SIGNIFICANT CHANGE UP (ref 0–1)
BILIRUB SERPL-MCNC: 1.1 MG/DL — SIGNIFICANT CHANGE UP (ref 0.2–1.2)
BUN SERPL-MCNC: 12 MG/DL — SIGNIFICANT CHANGE UP (ref 10–20)
CALCIUM SERPL-MCNC: 8.6 MG/DL — SIGNIFICANT CHANGE UP (ref 8.5–10.1)
CHLORIDE SERPL-SCNC: 106 MMOL/L — SIGNIFICANT CHANGE UP (ref 98–110)
CO2 SERPL-SCNC: 23 MMOL/L — SIGNIFICANT CHANGE UP (ref 17–32)
CREAT SERPL-MCNC: 0.9 MG/DL — SIGNIFICANT CHANGE UP (ref 0.7–1.5)
CULTURE RESULTS: NO GROWTH — SIGNIFICANT CHANGE UP
EOSINOPHIL # BLD AUTO: 0.04 K/UL — SIGNIFICANT CHANGE UP (ref 0–0.7)
EOSINOPHIL NFR BLD AUTO: 0.5 % — SIGNIFICANT CHANGE UP (ref 0–8)
ESTIMATED AVERAGE GLUCOSE: 120 MG/DL — HIGH (ref 68–114)
GLUCOSE SERPL-MCNC: 104 MG/DL — HIGH (ref 70–99)
HBA1C BLD-MCNC: 5.8 % — HIGH (ref 4–5.6)
HCT VFR BLD CALC: 32.9 % — LOW (ref 42–52)
HGB BLD-MCNC: 11.1 G/DL — LOW (ref 14–18)
IMM GRANULOCYTES NFR BLD AUTO: 0.6 % — HIGH (ref 0.1–0.3)
LYMPHOCYTES # BLD AUTO: 1.65 K/UL — SIGNIFICANT CHANGE UP (ref 1.2–3.4)
LYMPHOCYTES # BLD AUTO: 19.4 % — LOW (ref 20.5–51.1)
MAGNESIUM SERPL-MCNC: 1.9 MG/DL — SIGNIFICANT CHANGE UP (ref 1.8–2.4)
MCHC RBC-ENTMCNC: 32.4 PG — HIGH (ref 27–31)
MCHC RBC-ENTMCNC: 33.7 G/DL — SIGNIFICANT CHANGE UP (ref 32–37)
MCV RBC AUTO: 95.9 FL — HIGH (ref 80–94)
MONOCYTES # BLD AUTO: 0.49 K/UL — SIGNIFICANT CHANGE UP (ref 0.1–0.6)
MONOCYTES NFR BLD AUTO: 5.8 % — SIGNIFICANT CHANGE UP (ref 1.7–9.3)
NEUTROPHILS # BLD AUTO: 6.28 K/UL — SIGNIFICANT CHANGE UP (ref 1.4–6.5)
NEUTROPHILS NFR BLD AUTO: 73.6 % — SIGNIFICANT CHANGE UP (ref 42.2–75.2)
NRBC # BLD: 0 /100 WBCS — SIGNIFICANT CHANGE UP (ref 0–0)
PHOSPHATE SERPL-MCNC: 2.4 MG/DL — SIGNIFICANT CHANGE UP (ref 2.1–4.9)
PLATELET # BLD AUTO: 100 K/UL — LOW (ref 130–400)
POTASSIUM SERPL-MCNC: 3.2 MMOL/L — LOW (ref 3.5–5)
POTASSIUM SERPL-SCNC: 3.2 MMOL/L — LOW (ref 3.5–5)
PROT SERPL-MCNC: 4.9 G/DL — LOW (ref 6–8)
RBC # BLD: 3.43 M/UL — LOW (ref 4.7–6.1)
RBC # FLD: 12.8 % — SIGNIFICANT CHANGE UP (ref 11.5–14.5)
SODIUM SERPL-SCNC: 139 MMOL/L — SIGNIFICANT CHANGE UP (ref 135–146)
SPECIMEN SOURCE: SIGNIFICANT CHANGE UP
WBC # BLD: 8.52 K/UL — SIGNIFICANT CHANGE UP (ref 4.8–10.8)
WBC # FLD AUTO: 8.52 K/UL — SIGNIFICANT CHANGE UP (ref 4.8–10.8)

## 2020-02-10 PROCEDURE — 99233 SBSQ HOSP IP/OBS HIGH 50: CPT

## 2020-02-10 PROCEDURE — 93010 ELECTROCARDIOGRAM REPORT: CPT

## 2020-02-10 RX ORDER — ENOXAPARIN SODIUM 100 MG/ML
40 INJECTION SUBCUTANEOUS DAILY
Refills: 0 | Status: DISCONTINUED | OUTPATIENT
Start: 2020-02-10 | End: 2020-02-11

## 2020-02-10 RX ORDER — POTASSIUM CHLORIDE 20 MEQ
40 PACKET (EA) ORAL EVERY 4 HOURS
Refills: 0 | Status: DISCONTINUED | OUTPATIENT
Start: 2020-02-10 | End: 2020-02-11

## 2020-02-10 RX ORDER — METOPROLOL TARTRATE 50 MG
5 TABLET ORAL ONCE
Refills: 0 | Status: COMPLETED | OUTPATIENT
Start: 2020-02-10 | End: 2020-02-10

## 2020-02-10 RX ORDER — METOPROLOL TARTRATE 50 MG
12.5 TABLET ORAL
Refills: 0 | Status: DISCONTINUED | OUTPATIENT
Start: 2020-02-10 | End: 2020-02-15

## 2020-02-10 RX ADMIN — CLOPIDOGREL BISULFATE 75 MILLIGRAM(S): 75 TABLET, FILM COATED ORAL at 11:45

## 2020-02-10 RX ADMIN — Medication 5 MILLIGRAM(S): at 06:17

## 2020-02-10 RX ADMIN — Medication 81 MILLIGRAM(S): at 11:46

## 2020-02-10 RX ADMIN — CHLORHEXIDINE GLUCONATE 1 APPLICATION(S): 213 SOLUTION TOPICAL at 05:12

## 2020-02-10 RX ADMIN — ENOXAPARIN SODIUM 40 MILLIGRAM(S): 100 INJECTION SUBCUTANEOUS at 11:46

## 2020-02-10 RX ADMIN — ATORVASTATIN CALCIUM 80 MILLIGRAM(S): 80 TABLET, FILM COATED ORAL at 21:26

## 2020-02-10 RX ADMIN — Medication 40 MILLIEQUIVALENT(S): at 19:16

## 2020-02-10 RX ADMIN — Medication 40 MILLIEQUIVALENT(S): at 15:27

## 2020-02-10 RX ADMIN — Medication 40 MILLIEQUIVALENT(S): at 11:46

## 2020-02-10 NOTE — PROGRESS NOTE ADULT - ASSESSMENT
84 y/o M with PMH of pancreatic and liver cancer in remission since 40 years ago presented initially for new onset rigors an hour prior to presentation associated with lightheadedness and cough. Patient lives in [place] and is fully functional and takes care of all ADL without restrictions. Patient was initially treated for pneumonia and new onset with RVR. Pt converted back to sinus without intervention. Repeat EKG is done showing acute inferior STEMI. Pt was brought to [place] for further evaluation and emergent cardiac cath.    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  # bronchitis-- no ABX needed.

## 2020-02-10 NOTE — PROGRESS NOTE ADULT - SUBJECTIVE AND OBJECTIVE BOX
SUBJ: No new complains      MEDICATIONS  (STANDING):  aspirin  chewable 81 milliGRAM(s) Oral daily  atorvastatin 80 milliGRAM(s) Oral at bedtime  chlorhexidine 4% Liquid 1 Application(s) Topical <User Schedule>  clopidogrel Tablet 75 milliGRAM(s) Oral daily  enoxaparin Injectable 40 milliGRAM(s) SubCutaneous daily  potassium chloride    Tablet ER 40 milliEquivalent(s) Oral every 4 hours  sodium chloride 0.9%. 1000 milliLiter(s) (50 mL/Hr) IV Continuous <Continuous>    MEDICATIONS  (PRN):            Vital Signs Last 24 Hrs  T(C): 36.7 (10 Feb 2020 08:00), Max: 36.7 (09 Feb 2020 17:00)  T(F): 98.1 (10 Feb 2020 08:00), Max: 98.1 (09 Feb 2020 17:00)  HR: 74 (10 Feb 2020 08:00) (68 - 130)  BP: 98/62 (10 Feb 2020 08:00) (85/51 - 119/94)  BP(mean): 72 (10 Feb 2020 08:00) (60 - 106)  RR: 23 (10 Feb 2020 08:00) (17 - 36)  SpO2: 95% (10 Feb 2020 08:00) (95% - 100%)     REVIEW OF SYSTEMS:  CONSTITUTIONAL: No fever, weight loss, or fatigue  CARDIOLOGY: PAtient denies chest pain, shortness of breath or syncopal episodes.   RESPIRATORY: denies shortness of breath, wheezeing.   NEUROLOGICAL: NO weakness, no focal deficits to report.  ENDOCRINOLOGICAL: no recent change in diabetic medications.   GI: no BRBPR, no N,V,diarrhea.    PSYCHIATRY: normal mood and affect  HEENT: no nasal discharge, no ecchymosis  SKIN: no ecchymosis, no breakdown  MUSCULOSKELETAL: Full range of motion x4.        PHYSICAL EXAM:  · CONSTITUTIONAL:	Well-developed, well nourished    BMI-  ·RESPIRATORY:   airway patent; breath sounds equal; good air movement; respirations non-labored; clear to auscultation bilaterally; no chest wall tenderness; no intercostal retractions; no rales,rhonchi or wheeze  · CARDIOVASCULAR	regular rate and rhythm  no rub  no murmur  normal PMI  · EXTREMITIES: No cyanosis, clubbing or edema  · VASCULAR: 	Equal and normal pulses (carotid, femoral, dorsalis pedis)  	  TELEMETRY:    ECG:    TTE:    LABS:                        11.1   8.52  )-----------( 100      ( 10 Feb 2020 05:00 )             32.9     02-10    139  |  106  |  12  ----------------------------<  104<H>  3.2<L>   |  23  |  0.9    Ca    8.6      10 Feb 2020 05:00  Phos  2.4     02-10  Mg     1.9     02-10    TPro  4.9<L>  /  Alb  3.2<L>  /  TBili  1.1  /  DBili  x   /  AST  165<H>  /  ALT  46<H>  /  AlkPhos  54  02-10    CARDIAC MARKERS ( 09 Feb 2020 11:00 )  x     / 2.50 ng/mL / 1547 U/L / x     / 198.8 ng/mL  CARDIAC MARKERS ( 09 Feb 2020 01:50 )  x     / 0.06 ng/mL / x     / x     / x      CARDIAC MARKERS ( 08 Feb 2020 23:00 )  x     / <0.01 ng/mL / x     / x     / x          PT/INR - ( 08 Feb 2020 21:13 )   PT: 12.80 sec;   INR: 1.11 ratio         PTT - ( 08 Feb 2020 21:13 )  PTT:23.8 sec    I&O's Summary    09 Feb 2020 07:01  -  10 Feb 2020 07:00  --------------------------------------------------------  IN: 1690 mL / OUT: 1100 mL / NET: 590 mL      BNP  RADIOLOGY & ADDITIONAL STUDIES:    IMPRESSION AND PLAN:    Post inferior MI and PCI of the RCA  Afib, resol;lizbeth  LM and LAD disease need IVUS LM and if negative PCI of LAD  will schedule after discussing with family

## 2020-02-10 NOTE — PROGRESS NOTE ADULT - SUBJECTIVE AND OBJECTIVE BOX
Cardiology Follow up    LOKESH HUNT   85y Male  PAST MEDICAL & SURGICAL HISTORY:  No pertinent past medical history  No significant past surgical history       HPI:  86yo male traveling from [place] presented to ER with chill. In the ER found to have rapid atrial fibrillationalong with a pneumonia. No chest pain (08 Feb 2020 23:00)    Allergies    No Known Allergies    Intolerances      Patient without complaints.   Denies CP, SOB, palpitations, or dizziness  NSVT on telemetry overnight    Vital Signs Last 24 Hrs  T(C): 36.7 (10 Feb 2020 08:00), Max: 36.7 (09 Feb 2020 17:00)  T(F): 98.1 (10 Feb 2020 08:00), Max: 98.1 (09 Feb 2020 17:00)  HR: 74 (10 Feb 2020 08:00) (68 - 130)  BP: 98/62 (10 Feb 2020 08:00) (85/51 - 119/94)  BP(mean): 72 (10 Feb 2020 08:00) (60 - 106)  RR: 23 (10 Feb 2020 08:00) (17 - 36)  SpO2: 95% (10 Feb 2020 08:00) (95% - 100%)    MEDICATIONS  (STANDING):  aspirin  chewable 81 milliGRAM(s) Oral daily  atorvastatin 80 milliGRAM(s) Oral at bedtime  chlorhexidine 4% Liquid 1 Application(s) Topical <User Schedule>  clopidogrel Tablet 75 milliGRAM(s) Oral daily  enoxaparin Injectable 40 milliGRAM(s) SubCutaneous daily  potassium chloride    Tablet ER 40 milliEquivalent(s) Oral every 4 hours  sodium chloride 0.9%. 1000 milliLiter(s) (50 mL/Hr) IV Continuous <Continuous>    MEDICATIONS  (PRN):      REVIEW OF SYSTEMS:          CONSTITUTIONAL: No weakness, fevers or chills          EYES/ENT: No visual changes;  No vertigo or throat pain           NECK: No pain or stiffness          RESPIRATORY: No cough, wheezing, hemoptysis          CARDIOVASCULAR: no pain, no COOPER, no palpitations           GASTROINTESTINAL: No abdominal or epigastric pain. No nausea, vomiting, or hematemesis;           GENITOURINARY: No dysuria, frequency or hematuria          NEUROLOGICAL: No numbness or weakness          SKIN: No itching, rashes    PHYSICAL EXAM:           CONSTITUTIONAL: Well-developed; well-nourished; in no acute distress  	SKIN: warm, dry  	HEAD: Normocephalic; atraumatic  	EYES: PERRL.  	ENT: No nasal discharge, airway clear, mucous membranes moist  	NECK: Supple; non tender.  	CARD: +S1, +S2, no murmurs, gallops, or rubs. (Regular) rate and rhythm    	RESP: coarse BS b/l bases  	ABD: soft ntnd, + BS x 4 quadrants  	EXT: moves all extremities,  no clubbing, cyanosis or edema  	NEURO: Alert and oriented x3, no focal deficits          PSYCH: Cooperative, appropriate          VASCULAR:  + Rad / + PTs / + DPs          EXTREMITY:             Right Groin:  Dressing removed, + pulses,  access site soft, no hematoma, no pain, no numbness, no signs and symptoms of infection    ECG: Ventricular Rate 70 BPM    Atrial Rate 70 BPM    P-R Interval 246 ms    QRS Duration 94 ms    Q-T Interval 416 ms    QTC Calculation(Bezet) 449 ms    P Axis 57 degrees    R Axis -6 degrees    T Axis -39 degrees    Diagnosis Line Sinus rhythm with 1st degree A-V block  Inferior infarct , age undetermined  Abnormal ECG    Confirmed by Alanna Rai MD [id number]) on 2/10/2020 8:46:19 AM                                                                                                                  2D ECHO:  EXAM:  2-D ECHO (TTE) COMPLETE        PROCEDURE DATE:  02/09/2020      INTERPRETATION:  REPORT:    TRANSTHORACIC ECHOCARDIOGRAM REPORT         Patient Name:   LOKESH HUNT Accession #: [id number]  Medical Rec #:  [id number]       Height:      72.0in 182.9 cm  YOB: 1934       Weight:      209.0 lb 94.80 kg  Patient Age:    85 years        BSA:         2.17 m²  Patient Gender: M               BP:          98/55 mmHg       Date of Exam:        2/9/2020 7:47:55 AM  Referring Physician: [id number] ED UNASSIGNED  Sonographer:         ROBERT Corea Physician:   Bobby London M.D.    Procedure:    2D Echo/Doppler/Color Doppler Complete.  Indications:  I21.3 - ST Elevation STEMI Myocardial Infarction of unspecified  Diagnosis:    ST elevation (STEMI) myocardial infarction of unspecified site -                I21.3  Study         Technically good study.  Details:         Summary:   1. Left ventricular ejection fraction, by visual estimation, is 50 to 55%.   2. Spectral Doppler showsimpaired relaxation pattern of left ventricular myocardial filling (Grade I diastolic dysfunction).   3. Mild mitral valve regurgitation.   4. Mild aortic regurgitation.   5. There is moderate aortic root calcification.    PHYSICIAN INTERPRETATION:  Left Ventricle: The left ventricular internal cavity size is normal. Left ventricular wall thickness is normal. Left ventricular ejection fraction, by visual estimation, is 50 to 55%. Spectral Doppler shows impaired relaxation pattern of left ventricular myocardial filling (Grade I diastolic dysfunction).  Right Ventricle: The right ventricular size is mildly enlarged. RV systolic function is mildly reduced.  Left Atrium: Mildly enlarged left atrium.  Right Atrium: Mildly enlarged right atrium.  Mitral Valve: Structurally normal mitral valve, with normal leaflet excursion. No evidence of mitral valve stenosis. Mild mitral valve regurgitation is seen.  Tricuspid Valve: Structurally normal tricuspid valve, with normal leaflet excursion. No tricuspid regurgitation is visualized.  Aortic Valve: The aortic valve is trileaflet. No evidence of aortic stenosis. Mild aortic valve regurgitation is seen.  Pulmonic Valve: Structurally normal pulmonic valve, with normal leaflet excursion. Trace pulmonic valve regurgitation.  Aorta: The aortic root is normal in size and structure. There is moderate aortic root calcification.  Venous: The inferior vena cava was normal sized, with respiratory size variation greater than 50%.       2D AND M-MODE MEASUREMENTS (normal ranges within parentheses):  Left                  Normal   Aorta/Left             Normal  Ventricle:                     Atrium:  IVSd (2D):  1.15 cm  (0.7-1.1) AoV Cusp       1.59  (1.5-2.6)  LVPWd (2D): 1.08 cm  (0.7-1.1) Separation:    cm  LVIDd (2D): 4.76 cm  (3.4-5.7) Left Atrium    3.54  (1.9-4.0)  LVIDs (2D): 3.58 cm            (Mmode):        cm  LV FS (2D):  24.8 %   (>25%)   LA Volume      28.8  Relative      0.45    (<0.42)  Index         ml/m²  Wall      Right  Thickness                      Ventricle:                                 RVd (2D):      3.14 cm    SPECTRAL DOPPLER ANALYSIS:  LV DIASTOLIC FUNCTION:  MV Peak E: 0.46 m/s Decel Time: 152 msec  MV Peak A: 0.76 m/s  E/A Ratio: 0.61    Aortic Valve:  AoV VMax:    1.55 m/s AoV Area, Vmax: 2.20 cm² Vmax Indx: 1.01 cm²/m²  AoV Pk Grad: 9.6 mmHg    LVOT Vmax: 0.99 m/s  LVOT VTI:  0.21 m  LVOT Diam: 2.09 cm    Aortic Insufficiency:  AI Half-time:  415 msec  AI Decel Rate: 2.57 m/s²    Mitral Valve:  MV P1/2 Time: 44.01 msec  MV Area, PHT: 5.00 cm²    Pulmonic Valve:  PV Max Velocity: 0.88 m/s PV Max PG: 3.1 mmHg PV Mean PG:       [id number] Bobby London M.D., Electronically signed on 2/9/2020 at 2:40:50 PM              *** Final ***                    BOBBY LONDON MD  This document has been electronically signed. Feb 9 2020  7:47AM        LABS:                        11.1   8.52  )-----------( 100      ( 10 Feb 2020 05:00 )             32.9     02-10    139  |  106  |  12  ----------------------------<  104<H>  3.2<L>   |  23  |  0.9    Ca    8.6      10 Feb 2020 05:00  Phos  2.4     02-10  Mg     1.9     02-10    TPro  4.9<L>  /  Alb  3.2<L>  /  TBili  1.1  /  DBili  x   /  AST  165<H>  /  ALT  46<H>  /  AlkPhos  54  02-10    CARDIAC MARKERS ( 09 Feb 2020 11:00 )  x     / 2.50 ng/mL / 1547 U/L / x     / 198.8 ng/mL  CARDIAC MARKERS ( 09 Feb 2020 01:50 )  x     / 0.06 ng/mL / x     / x     / x      CARDIAC MARKERS ( 08 Feb 2020 23:00 )  x     / <0.01 ng/mL / x     / x     / x          Magnesium, Serum: 1.9 mg/dL [1.8 - 2.4] (02-10-20 @ 05:00)  Magnesium, Serum: 1.9 mg/dL [1.8 - 2.4] (02-09-20 @ 11:00)  LIVER FUNCTIONS - ( 10 Feb 2020 05:00 )  Alb: 3.2 g/dL / Pro: 4.9 g/dL / ALK PHOS: 54 U/L / ALT: 46 U/L / AST: 165 U/L / GGT: x             A/P:  I discussed the case with Cardiologist Dr. Rouse and recommend the following:    S/P PCI dRCA NILSON x 1/ 2V CAD (RCA and LAD)/STEMI  	     Continue DAPT (asa 81mg daily, plavix 75mg daily), Statin Therapy                   no BB/ACE due to hypotension                    monitor platelet level                   keep K>4, Mg>2                   trend LFT's/ CE                   daily  EKG                   Patient given 30 day supply of ( Aspirin 81 mg daily and Plavix 75 mg daily ) to take at home                   Patient agreeing to take DAPT for at least one year or as directed by cardiologist                    Pt given instructions on importance of taking antiplatelet medication or risk acute stent thrombosis/death                   Post cath instructions, access site care and activity restrictions reviewed with patient                     Discussed with patient to return to hospital if experience chest pain, shortness breath, dizziness and site bleeding                   Aggressive risk factor modification, diet counseling, smoking cessation discussed with patient                       pt will need staged PCI of LAD, f/u with Dr. Rouse regarding disposition Cardiology Follow up    LOKESH HUNT   85y Male  PAST MEDICAL & SURGICAL HISTORY:  No pertinent past medical history  No significant past surgical history       HPI:  84yo male traveling from [place] presented to ER with chill. In the ER found to have rapid atrial fibrillationalong with a pneumonia. No chest pain (08 Feb 2020 23:00)    Allergies    No Known Allergies    Intolerances      Patient without complaints.   Denies CP, SOB, palpitations, or dizziness  NSVT on telemetry overnight    Vital Signs Last 24 Hrs  T(C): 36.7 (10 Feb 2020 08:00), Max: 36.7 (09 Feb 2020 17:00)  T(F): 98.1 (10 Feb 2020 08:00), Max: 98.1 (09 Feb 2020 17:00)  HR: 74 (10 Feb 2020 08:00) (68 - 130)  BP: 98/62 (10 Feb 2020 08:00) (85/51 - 119/94)  BP(mean): 72 (10 Feb 2020 08:00) (60 - 106)  RR: 23 (10 Feb 2020 08:00) (17 - 36)  SpO2: 95% (10 Feb 2020 08:00) (95% - 100%)    MEDICATIONS  (STANDING):  aspirin  chewable 81 milliGRAM(s) Oral daily  atorvastatin 80 milliGRAM(s) Oral at bedtime  chlorhexidine 4% Liquid 1 Application(s) Topical <User Schedule>  clopidogrel Tablet 75 milliGRAM(s) Oral daily  enoxaparin Injectable 40 milliGRAM(s) SubCutaneous daily  potassium chloride    Tablet ER 40 milliEquivalent(s) Oral every 4 hours  sodium chloride 0.9%. 1000 milliLiter(s) (50 mL/Hr) IV Continuous <Continuous>    MEDICATIONS  (PRN):      REVIEW OF SYSTEMS:          CONSTITUTIONAL: No weakness, fevers or chills          EYES/ENT: No visual changes;  No vertigo or throat pain           NECK: No pain or stiffness          RESPIRATORY: No cough, wheezing, hemoptysis          CARDIOVASCULAR: no pain, no COOPER, no palpitations           GASTROINTESTINAL: No abdominal or epigastric pain. No nausea, vomiting, or hematemesis;           GENITOURINARY: No dysuria, frequency or hematuria          NEUROLOGICAL: No numbness or weakness          SKIN: No itching, rashes    PHYSICAL EXAM:           CONSTITUTIONAL: Well-developed; well-nourished; in no acute distress  	SKIN: warm, dry  	HEAD: Normocephalic; atraumatic  	EYES: PERRL.  	ENT: No nasal discharge, airway clear, mucous membranes moist  	NECK: Supple; non tender.  	CARD: +S1, +S2, no murmurs, gallops, or rubs. (Regular) rate and rhythm    	RESP: coarse BS b/l bases  	ABD: soft ntnd, + BS x 4 quadrants  	EXT: moves all extremities,  no clubbing, cyanosis or edema  	NEURO: Alert and oriented x3, no focal deficits          PSYCH: Cooperative, appropriate          VASCULAR:  + Rad / + PTs / + DPs          EXTREMITY:             Right Groin:  Dressing removed, + pulses,  access site soft, no hematoma, no pain, no numbness, no signs and symptoms of infection    ECG: Ventricular Rate 70 BPM    Atrial Rate 70 BPM    P-R Interval 246 ms    QRS Duration 94 ms    Q-T Interval 416 ms    QTC Calculation(Bezet) 449 ms    P Axis 57 degrees    R Axis -6 degrees    T Axis -39 degrees    Diagnosis Line Sinus rhythm with 1st degree A-V block  Inferior infarct , age undetermined  Abnormal ECG    Confirmed by Alanna Rai MD [id number]) on 2/10/2020 8:46:19 AM                                                                                                                  2D ECHO:  EXAM:  2-D ECHO (TTE) COMPLETE        PROCEDURE DATE:  02/09/2020      INTERPRETATION:  REPORT:    TRANSTHORACIC ECHOCARDIOGRAM REPORT         Patient Name:   LOKESH HUNT Accession #: [id number]  Medical Rec #:  [id number]       Height:      72.0in 182.9 cm  YOB: 1934       Weight:      209.0 lb 94.80 kg  Patient Age:    85 years        BSA:         2.17 m²  Patient Gender: M               BP:          98/55 mmHg       Date of Exam:        2/9/2020 7:47:55 AM  Referring Physician: [id number] ED UNASSIGNED  Sonographer:         ROBERT Corea Physician:   Bobby Lonodn M.D.    Procedure:    2D Echo/Doppler/Color Doppler Complete.  Indications:  I21.3 - ST Elevation STEMI Myocardial Infarction of unspecified  Diagnosis:    ST elevation (STEMI) myocardial infarction of unspecified site -                I21.3  Study         Technically good study.  Details:         Summary:   1. Left ventricular ejection fraction, by visual estimation, is 50 to 55%.   2. Spectral Doppler showsimpaired relaxation pattern of left ventricular myocardial filling (Grade I diastolic dysfunction).   3. Mild mitral valve regurgitation.   4. Mild aortic regurgitation.   5. There is moderate aortic root calcification.    PHYSICIAN INTERPRETATION:  Left Ventricle: The left ventricular internal cavity size is normal. Left ventricular wall thickness is normal. Left ventricular ejection fraction, by visual estimation, is 50 to 55%. Spectral Doppler shows impaired relaxation pattern of left ventricular myocardial filling (Grade I diastolic dysfunction).  Right Ventricle: The right ventricular size is mildly enlarged. RV systolic function is mildly reduced.  Left Atrium: Mildly enlarged left atrium.  Right Atrium: Mildly enlarged right atrium.  Mitral Valve: Structurally normal mitral valve, with normal leaflet excursion. No evidence of mitral valve stenosis. Mild mitral valve regurgitation is seen.  Tricuspid Valve: Structurally normal tricuspid valve, with normal leaflet excursion. No tricuspid regurgitation is visualized.  Aortic Valve: The aortic valve is trileaflet. No evidence of aortic stenosis. Mild aortic valve regurgitation is seen.  Pulmonic Valve: Structurally normal pulmonic valve, with normal leaflet excursion. Trace pulmonic valve regurgitation.  Aorta: The aortic root is normal in size and structure. There is moderate aortic root calcification.  Venous: The inferior vena cava was normal sized, with respiratory size variation greater than 50%.       2D AND M-MODE MEASUREMENTS (normal ranges within parentheses):  Left                  Normal   Aorta/Left             Normal  Ventricle:                     Atrium:  IVSd (2D):  1.15 cm  (0.7-1.1) AoV Cusp       1.59  (1.5-2.6)  LVPWd (2D): 1.08 cm  (0.7-1.1) Separation:    cm  LVIDd (2D): 4.76 cm  (3.4-5.7) Left Atrium    3.54  (1.9-4.0)  LVIDs (2D): 3.58 cm            (Mmode):        cm  LV FS (2D):  24.8 %   (>25%)   LA Volume      28.8  Relative      0.45    (<0.42)  Index         ml/m²  Wall      Right  Thickness                      Ventricle:                                 RVd (2D):      3.14 cm    SPECTRAL DOPPLER ANALYSIS:  LV DIASTOLIC FUNCTION:  MV Peak E: 0.46 m/s Decel Time: 152 msec  MV Peak A: 0.76 m/s  E/A Ratio: 0.61    Aortic Valve:  AoV VMax:    1.55 m/s AoV Area, Vmax: 2.20 cm² Vmax Indx: 1.01 cm²/m²  AoV Pk Grad: 9.6 mmHg    LVOT Vmax: 0.99 m/s  LVOT VTI:  0.21 m  LVOT Diam: 2.09 cm    Aortic Insufficiency:  AI Half-time:  415 msec  AI Decel Rate: 2.57 m/s²    Mitral Valve:  MV P1/2 Time: 44.01 msec  MV Area, PHT: 5.00 cm²    Pulmonic Valve:  PV Max Velocity: 0.88 m/s PV Max PG: 3.1 mmHg PV Mean PG:       [id number] Bobby London M.D., Electronically signed on 2/9/2020 at 2:40:50 PM              *** Final ***                    BOBBY LONDON MD  This document has been electronically signed. Feb 9 2020  7:47AM        LABS:                        11.1   8.52  )-----------( 100      ( 10 Feb 2020 05:00 )             32.9     02-10    139  |  106  |  12  ----------------------------<  104<H>  3.2<L>   |  23  |  0.9    Ca    8.6      10 Feb 2020 05:00  Phos  2.4     02-10  Mg     1.9     02-10    TPro  4.9<L>  /  Alb  3.2<L>  /  TBili  1.1  /  DBili  x   /  AST  165<H>  /  ALT  46<H>  /  AlkPhos  54  02-10    CARDIAC MARKERS ( 09 Feb 2020 11:00 )  x     / 2.50 ng/mL / 1547 U/L / x     / 198.8 ng/mL  CARDIAC MARKERS ( 09 Feb 2020 01:50 )  x     / 0.06 ng/mL / x     / x     / x      CARDIAC MARKERS ( 08 Feb 2020 23:00 )  x     / <0.01 ng/mL / x     / x     / x          Magnesium, Serum: 1.9 mg/dL [1.8 - 2.4] (02-10-20 @ 05:00)  Magnesium, Serum: 1.9 mg/dL [1.8 - 2.4] (02-09-20 @ 11:00)  LIVER FUNCTIONS - ( 10 Feb 2020 05:00 )  Alb: 3.2 g/dL / Pro: 4.9 g/dL / ALK PHOS: 54 U/L / ALT: 46 U/L / AST: 165 U/L / GGT: x             A/P:  I discussed the case with Cardiologist Dr. Rouse and recommend the following:    S/P PCI dRCA NILSON x 1/ 2V CAD (RCA and LAD)/STEMI  	     Continue DAPT (asa 81mg daily, plavix 75mg daily), Statin Therapy                   no BB/ACE due to hypotension                    monitor platelet level                   keep K>4, Mg>2                   trend LFT's/ CE                   daily  EKG                   Patient given 30 day supply of ( Aspirin 81 mg daily and Plavix 75 mg daily ) to take at home                   Patient agreeing to take DAPT for at least one year or as directed by cardiologist                    Pt given instructions on importance of taking antiplatelet medication or risk acute stent thrombosis/death                   Post cath instructions, access site care and activity restrictions reviewed with patient                     Discussed with patient to return to hospital if experience chest pain, shortness breath, dizziness and site bleeding                   Aggressive risk factor modification, diet counseling, smoking cessation discussed with patient                       pt will need staged PCI of LAD, planned for Wed if pt and family agreeable

## 2020-02-10 NOTE — PROGRESS NOTE ADULT - SUBJECTIVE AND OBJECTIVE BOX
Hospital Day:  1d    Chief Complaint: Patient is a 85y old Male with no PMH who presents with cough, subsequently found to have inferior wall STEMI.    24 hour events:  -No acute events overnight   -Overnight, developed AFib with RVR w/ HR in 150s - given lopressor 5mg IV with resolution of HR and conversion to NSR  -Patient afebrile, no leukocytosis    Tele: NSR, HR in 60s/70s    Subjective:  -No new concerns  -Denies CP, SOB, palpitations, fevers/chills, abdominal pain, nausea/vomiting, diarrhea     Past Medical Hx:   No pertinent past medical history    Past Sx:  No significant past surgical history    Allergies:  No Known Allergies    Current Meds:   Standing Meds:  aspirin  chewable 81 milliGRAM(s) Oral daily  atorvastatin 80 milliGRAM(s) Oral at bedtime  chlorhexidine 4% Liquid 1 Application(s) Topical <User Schedule>  clopidogrel Tablet 75 milliGRAM(s) Oral daily  enoxaparin Injectable 40 milliGRAM(s) SubCutaneous daily  potassium chloride    Tablet ER 40 milliEquivalent(s) Oral every 4 hours  sodium chloride 0.9%. 1000 milliLiter(s) (50 mL/Hr) IV Continuous <Continuous>    PRN Meds:    HOME MEDICATIONS:    Vital Signs:   T(F): 98.1 (02-10-20 @ 08:00), Max: 98.1 (20 @ 17:00)  HR: 70 (02-10-20 @ 10:00) (68 - 130)  BP: 96/60 (02-10-20 @ 10:00) (85/51 - 119/94)  RR: 20 (02-10-20 @ 10:00) (17 - 36)  SpO2: 95% (02-10-20 @ 08:00) (95% - 100%)      20 @ 07:01  -  02-10-20 @ 07:00  --------------------------------------------------------  IN: 1690 mL / OUT: 1100 mL / NET: 590 mL    Physical Exam:   GENERAL: NAD  HEENT: NCAT  CHEST/LUNG: CTAB  HEART: Regular rate and rhythm; s1 s2 appreciated, No murmurs, rubs, or gallops  ABDOMEN: Soft, Nontender, Nondistended; Bowel sounds present  EXTREMITIES: No LE edema b/l  NERVOUS SYSTEM:  Alert & Oriented X3    Labs:                         11.1   8.52  )-----------( 100      ( 10 Feb 2020 05:00 )             32.9     Neutophil% 73.6, Lymphocyte% 19.4, Monocyte% 5.8, Bands% 0.6 02-10-20 @ 05:00    10 Feb 2020 05:00    139    |  106    |  12     ----------------------------<  104    3.2     |  23     |  0.9      Ca    8.6        10 Feb 2020 05:00  Phos  2.4       10 Feb 2020 05:00  Mg     1.9       10 Feb 2020 05:00    TPro  4.9    /  Alb  3.2    /  TBili  1.1    /  DBili  x      /  AST  165    /  ALT  46     /  AlkPhos  54     10 Feb 2020 05:00    Serum Pro-Brain Natriuretic Peptide: 444 pg/mL (20 @ 01:50)    Troponin 2.50, CKMB 198.8, CK 1547 20 @ 11:00  Troponin 0.06, CKMB --, CK -- 20 @ 01:50  Troponin <0.01, CKMB --, CK -- 20 @ 23:00    Urinalysis Basic - ( 2020 23:00 )    Color: Yellow / Appearance: Clear / S.025 / pH: x  Gluc: x / Ketone: 15  / Bili: Negative / Urobili: 1.0 mg/dL   Blood: x / Protein: Negative mg/dL / Nitrite: Negative   Leuk Esterase: Negative / RBC: x / WBC x   Sq Epi: x / Non Sq Epi: x / Bacteria: x    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, mid circ no disease, distal circ no disease     Right Coronary Artery: prox RCA mild disease, mid RCA moderate disease, distal % occlusion ZEFERINO 0 flow    seen after achieving reperfusion  RPDA: thrombus/significant stenosis 95% at the branch point of RPDA from distal RCA ZEFERINO 3 flow, rest of vessel no disease  RPL: no disease    Ramus Intermed: no disease    DOMINANCE: Right    ACCESS: Right femoral artery  CLOSURE: Perclose     INTERVENTION  IMPLANTS: Synergy 3.0x12mm to distal % lesion    POST-OP DIAGNOSIS  LM disease, 2V disease (RCA and LAD)    < from: Transthoracic Echocardiogram (20 @ 07:47) >  Summary:   1. Left ventricular ejection fraction, by visual estimation, is 50 to 55%.   2. Spectral Doppler showsimpaired relaxation pattern of left ventricular myocardial filling (Grade I diastolic dysfunction).   3. Mild mitral valve regurgitation.   4. Mild aortic regurgitation.   5. There is moderate aortic root calcification.    < end of copied text >      Assessment and Plan:   85y old Male with no PMH who presents with cough, subsequently found to have inferior wall STEMI    #Inferior wall MI:  -s/p emergent cath on  with RCA implant  -has LM 40% and LAD 80% occlusion  -cardiology recs noted: will need IVUS of left main, and if negative, will need PCI of LAD  -continue with DAPT and lipitor 80mg qd    #New onset AFib w/ RVr  -currently in NSR  -beta-blockers not initially started due to borderline BP  -if BP normalizes, can start metoprolol 12.5mg bid for rate control since patient intermittently develops AFib with RVR  _____________________________________________  DVT ppx: lovenox subq  GI ppx: not indicated  Activity: ambulate as tolerated  Diet: DASH/TLC  Code Status: full code    DISPO: pending further management of LAD/LM occlusions; from home Hospital Day:  1d    Chief Complaint: Patient is a 85y old Male with no PMH who presents with cough, subsequently found to have inferior wall STEMI.    24 hour events:  -No acute events overnight   -Overnight, developed AFib with RVR w/ HR in 150s - given lopressor 5mg IV with resolution of HR and conversion to NSR  -Patient afebrile, no leukocytosis    Tele: NSR, HR in 60s/70s    Subjective:  -No new concerns  -Denies CP, SOB, palpitations, fevers/chills, abdominal pain, nausea/vomiting, diarrhea     Past Medical Hx:   No pertinent past medical history    Past Sx:  No significant past surgical history    Allergies:  No Known Allergies    Current Meds:   Standing Meds:  aspirin  chewable 81 milliGRAM(s) Oral daily  atorvastatin 80 milliGRAM(s) Oral at bedtime  chlorhexidine 4% Liquid 1 Application(s) Topical <User Schedule>  clopidogrel Tablet 75 milliGRAM(s) Oral daily  enoxaparin Injectable 40 milliGRAM(s) SubCutaneous daily  potassium chloride    Tablet ER 40 milliEquivalent(s) Oral every 4 hours  sodium chloride 0.9%. 1000 milliLiter(s) (50 mL/Hr) IV Continuous <Continuous>    PRN Meds:    HOME MEDICATIONS:    Vital Signs:   T(F): 98.1 (02-10-20 @ 08:00), Max: 98.1 (20 @ 17:00)  HR: 70 (02-10-20 @ 10:00) (68 - 130)  BP: 96/60 (02-10-20 @ 10:00) (85/51 - 119/94)  RR: 20 (02-10-20 @ 10:00) (17 - 36)  SpO2: 95% (02-10-20 @ 08:00) (95% - 100%)      20 @ 07:01  -  02-10-20 @ 07:00  --------------------------------------------------------  IN: 1690 mL / OUT: 1100 mL / NET: 590 mL    Physical Exam:   GENERAL: NAD  HEENT: NCAT  CHEST/LUNG: CTAB  HEART: Regular rate and rhythm; s1 s2 appreciated, No murmurs, rubs, or gallops  ABDOMEN: Soft, Nontender, Nondistended; Bowel sounds present  EXTREMITIES: No LE edema b/l  NERVOUS SYSTEM:  Alert & Oriented X3    Labs:                         11.1   8.52  )-----------( 100      ( 10 Feb 2020 05:00 )             32.9     Neutophil% 73.6, Lymphocyte% 19.4, Monocyte% 5.8, Bands% 0.6 02-10-20 @ 05:00    10 Feb 2020 05:00    139    |  106    |  12     ----------------------------<  104    3.2     |  23     |  0.9      Ca    8.6        10 Feb 2020 05:00  Phos  2.4       10 Feb 2020 05:00  Mg     1.9       10 Feb 2020 05:00    TPro  4.9    /  Alb  3.2    /  TBili  1.1    /  DBili  x      /  AST  165    /  ALT  46     /  AlkPhos  54     10 Feb 2020 05:00    Serum Pro-Brain Natriuretic Peptide: 444 pg/mL (20 @ 01:50)    Troponin 2.50, CKMB 198.8, CK 1547 20 @ 11:00  Troponin 0.06, CKMB --, CK -- 20 @ 01:50  Troponin <0.01, CKMB --, CK -- 20 @ 23:00    Urinalysis Basic - ( 2020 23:00 )    Color: Yellow / Appearance: Clear / S.025 / pH: x  Gluc: x / Ketone: 15  / Bili: Negative / Urobili: 1.0 mg/dL   Blood: x / Protein: Negative mg/dL / Nitrite: Negative   Leuk Esterase: Negative / RBC: x / WBC x   Sq Epi: x / Non Sq Epi: x / Bacteria: x    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, mid circ no disease, distal circ no disease     Right Coronary Artery: prox RCA mild disease, mid RCA moderate disease, distal % occlusion ZEFERINO 0 flow    seen after achieving reperfusion  RPDA: thrombus/significant stenosis 95% at the branch point of RPDA from distal RCA ZEFERINO 3 flow, rest of vessel no disease  RPL: no disease    Ramus Intermed: no disease    DOMINANCE: Right    ACCESS: Right femoral artery  CLOSURE: Perclose     INTERVENTION  IMPLANTS: Synergy 3.0x12mm to distal % lesion    POST-OP DIAGNOSIS  LM disease, 2V disease (RCA and LAD)    < from: Transthoracic Echocardiogram (20 @ 07:47) >  Summary:   1. Left ventricular ejection fraction, by visual estimation, is 50 to 55%.   2. Spectral Doppler showsimpaired relaxation pattern of left ventricular myocardial filling (Grade I diastolic dysfunction).   3. Mild mitral valve regurgitation.   4. Mild aortic regurgitation.   5. There is moderate aortic root calcification.    < end of copied text >      Assessment and Plan:   85y old Male with no PMH who presents with cough, subsequently found to have inferior wall STEMI    #Inferior wall MI:  -s/p emergent cath on  with RCA implant  -has LM 40% and LAD 80% occlusion  -cardiology recs noted: will need IVUS of left main, and if negative, will need PCI of LAD  -continue with DAPT and lipitor 80mg qd    #New onset AFib w/ RVR  -currently in NSR, intemittently will revert back to AF w/ RVR (HR in 130s)  -will start metoprolol 12.5mg bid for rate control - if patient becomes hypotensive, stop metoprolol  _____________________________________________  DVT ppx: lovenox subq  GI ppx: not indicated  Activity: ambulate as tolerated  Diet: DASH/TLC  Code Status: full code    DISPO: pending further management of LAD/LM occlusions; from home

## 2020-02-10 NOTE — PROGRESS NOTE ADULT - SUBJECTIVE AND OBJECTIVE BOX
SUBJECTIVE:    Patient is a 85y old Male who presents with a chief complaint of STEMI (10 Feb 2020 09:16)    Currently admitted to medicine with the primary diagnosis of Pneumonia     Today is hospital day 1d.     PAST MEDICAL & SURGICAL HISTORY  No pertinent past medical history  No significant past surgical history    ALLERGIES:  No Known Allergies    MEDICATIONS:  STANDING MEDICATIONS  aspirin  chewable 81 milliGRAM(s) Oral daily  atorvastatin 80 milliGRAM(s) Oral at bedtime  chlorhexidine 4% Liquid 1 Application(s) Topical <User Schedule>  clopidogrel Tablet 75 milliGRAM(s) Oral daily  enoxaparin Injectable 40 milliGRAM(s) SubCutaneous daily  potassium chloride    Tablet ER 40 milliEquivalent(s) Oral every 4 hours  sodium chloride 0.9%. 1000 milliLiter(s) IV Continuous <Continuous>    PRN MEDICATIONS    VITALS:   T(F): 99.1  HR: 78  BP: 112/60  RR: 23  SpO2: 95%    LABS:                        11.1   8.52  )-----------( 100      ( 10 Feb 2020 05:00 )             32.9     02-10    139  |  106  |  12  ----------------------------<  104<H>  3.2<L>   |  23  |  0.9    Ca    8.6      10 Feb 2020 05:00  Phos  2.4     02-10  Mg     1.9     02-10    TPro  4.9<L>  /  Alb  3.2<L>  /  TBili  1.1  /  DBili  x   /  AST  165<H>  /  ALT  46<H>  /  AlkPhos  54  02-10    PT/INR - ( 2020 21:13 )   PT: 12.80 sec;   INR: 1.11 ratio         PTT - ( 2020 21:13 )  PTT:23.8 sec  Urinalysis Basic - ( 2020 23:00 )    Color: Yellow / Appearance: Clear / S.025 / pH: x  Gluc: x / Ketone: 15  / Bili: Negative / Urobili: 1.0 mg/dL   Blood: x / Protein: Negative mg/dL / Nitrite: Negative   Leuk Esterase: Negative / RBC: x / WBC x   Sq Epi: x / Non Sq Epi: x / Bacteria: x            Culture - Urine (collected 2020 23:10)  Source: .Urine Clean Catch (Midstream)  Final Report (10 Feb 2020 11:50):    No growth      CARDIAC MARKERS ( 2020 11:00 )  x     / 2.50 ng/mL / 1547 U/L / x     / 198.8 ng/mL  CARDIAC MARKERS ( 2020 01:50 )  x     / 0.06 ng/mL / x     / x     / x      CARDIAC MARKERS ( 2020 23:00 )  x     / <0.01 ng/mL / x     / x     / x          RADIOLOGY:    PHYSICAL EXAM:  GEN: No acute distress  LUNGS: Clear to auscultation bilaterally   HEART: S1/S2 present. RRR.   ABD/ GI: Soft, non-tender, non-distended. Bowel sounds present  EXT: NC/NC/NE/2+PP/MCGHEE  NEURO: AAOX3 SUBJECTIVE:    Patient is a 85y old Male who presents with a chief complaint of STEMI (10 Feb 2020 09:16)         Today is hospital day 1d.     PAST MEDICAL & SURGICAL HISTORY  No pertinent past medical history  No significant past surgical history    ALLERGIES:  No Known Allergies    MEDICATIONS:  STANDING MEDICATIONS  aspirin  chewable 81 milliGRAM(s) Oral daily  atorvastatin 80 milliGRAM(s) Oral at bedtime  chlorhexidine 4% Liquid 1 Application(s) Topical <User Schedule>  clopidogrel Tablet 75 milliGRAM(s) Oral daily  enoxaparin Injectable 40 milliGRAM(s) SubCutaneous daily  potassium chloride    Tablet ER 40 milliEquivalent(s) Oral every 4 hours  sodium chloride 0.9%. 1000 milliLiter(s) IV Continuous <Continuous>    PRN MEDICATIONS    VITALS:   T(F): 99.1  HR: 78  BP: 112/60  RR: 23  SpO2: 95%    LABS:                        11.1   8.52  )-----------( 100      ( 10 Feb 2020 05:00 )             32.9     02-10    139  |  106  |  12  ----------------------------<  104<H>  3.2<L>   |  23  |  0.9    Ca    8.6      10 Feb 2020 05:00  Phos  2.4     02-10  Mg     1.9     02-10    TPro  4.9<L>  /  Alb  3.2<L>  /  TBili  1.1  /  DBili  x   /  AST  165<H>  /  ALT  46<H>  /  AlkPhos  54  02-10    PT/INR - ( 2020 21:13 )   PT: 12.80 sec;   INR: 1.11 ratio         PTT - ( 2020 21:13 )  PTT:23.8 sec  Urinalysis Basic - ( 2020 23:00 )    Color: Yellow / Appearance: Clear / S.025 / pH: x  Gluc: x / Ketone: 15  / Bili: Negative / Urobili: 1.0 mg/dL   Blood: x / Protein: Negative mg/dL / Nitrite: Negative   Leuk Esterase: Negative / RBC: x / WBC x   Sq Epi: x / Non Sq Epi: x / Bacteria: x            Culture - Urine (collected 2020 23:10)  Source: .Urine Clean Catch (Midstream)  Final Report (10 Feb 2020 11:50):    No growth      CARDIAC MARKERS ( 2020 11:00 )  x     / 2.50 ng/mL / 1547 U/L / x     / 198.8 ng/mL  CARDIAC MARKERS ( 2020 01:50 )  x     / 0.06 ng/mL / x     / x     / x      CARDIAC MARKERS ( 2020 23:00 )  x     / <0.01 ng/mL / x     / x     / x          RADIOLOGY:    PHYSICAL EXAM:  GEN: No acute distress  LUNGS: Clear to auscultation bilaterally   HEART: S1/S2 present. RRR.   ABD/ GI: Soft, non-tender, non-distended. Bowel sounds present  EXT: NC/NC/NE/2+PP/MCGHEE  NEURO: AAOX3

## 2020-02-11 LAB
ALBUMIN SERPL ELPH-MCNC: 3.3 G/DL — LOW (ref 3.5–5.2)
ALP SERPL-CCNC: 62 U/L — SIGNIFICANT CHANGE UP (ref 30–115)
ALT FLD-CCNC: 38 U/L — SIGNIFICANT CHANGE UP (ref 0–41)
ANION GAP SERPL CALC-SCNC: 13 MMOL/L — SIGNIFICANT CHANGE UP (ref 7–14)
ANION GAP SERPL CALC-SCNC: 14 MMOL/L — SIGNIFICANT CHANGE UP (ref 7–14)
AST SERPL-CCNC: 92 U/L — HIGH (ref 0–41)
BASOPHILS # BLD AUTO: 0.01 K/UL — SIGNIFICANT CHANGE UP (ref 0–0.2)
BASOPHILS NFR BLD AUTO: 0.1 % — SIGNIFICANT CHANGE UP (ref 0–1)
BILIRUB SERPL-MCNC: 1.1 MG/DL — SIGNIFICANT CHANGE UP (ref 0.2–1.2)
BUN SERPL-MCNC: 12 MG/DL — SIGNIFICANT CHANGE UP (ref 10–20)
BUN SERPL-MCNC: 15 MG/DL — SIGNIFICANT CHANGE UP (ref 10–20)
CALCIUM SERPL-MCNC: 8.6 MG/DL — SIGNIFICANT CHANGE UP (ref 8.5–10.1)
CALCIUM SERPL-MCNC: 9.1 MG/DL — SIGNIFICANT CHANGE UP (ref 8.5–10.1)
CHLORIDE SERPL-SCNC: 105 MMOL/L — SIGNIFICANT CHANGE UP (ref 98–110)
CHLORIDE SERPL-SCNC: 106 MMOL/L — SIGNIFICANT CHANGE UP (ref 98–110)
CO2 SERPL-SCNC: 21 MMOL/L — SIGNIFICANT CHANGE UP (ref 17–32)
CO2 SERPL-SCNC: 21 MMOL/L — SIGNIFICANT CHANGE UP (ref 17–32)
CREAT SERPL-MCNC: 0.9 MG/DL — SIGNIFICANT CHANGE UP (ref 0.7–1.5)
CREAT SERPL-MCNC: 0.9 MG/DL — SIGNIFICANT CHANGE UP (ref 0.7–1.5)
EOSINOPHIL # BLD AUTO: 0.13 K/UL — SIGNIFICANT CHANGE UP (ref 0–0.7)
EOSINOPHIL NFR BLD AUTO: 1.5 % — SIGNIFICANT CHANGE UP (ref 0–8)
GLUCOSE BLDC GLUCOMTR-MCNC: 100 MG/DL — HIGH (ref 70–99)
GLUCOSE SERPL-MCNC: 101 MG/DL — HIGH (ref 70–99)
GLUCOSE SERPL-MCNC: 111 MG/DL — HIGH (ref 70–99)
HCT VFR BLD CALC: 36.9 % — LOW (ref 42–52)
HGB BLD-MCNC: 12.4 G/DL — LOW (ref 14–18)
IMM GRANULOCYTES NFR BLD AUTO: 0.5 % — HIGH (ref 0.1–0.3)
LYMPHOCYTES # BLD AUTO: 1.54 K/UL — SIGNIFICANT CHANGE UP (ref 1.2–3.4)
LYMPHOCYTES # BLD AUTO: 18.3 % — LOW (ref 20.5–51.1)
MAGNESIUM SERPL-MCNC: 2.2 MG/DL — SIGNIFICANT CHANGE UP (ref 1.8–2.4)
MCHC RBC-ENTMCNC: 32.9 PG — HIGH (ref 27–31)
MCHC RBC-ENTMCNC: 33.6 G/DL — SIGNIFICANT CHANGE UP (ref 32–37)
MCV RBC AUTO: 97.9 FL — HIGH (ref 80–94)
MONOCYTES # BLD AUTO: 0.44 K/UL — SIGNIFICANT CHANGE UP (ref 0.1–0.6)
MONOCYTES NFR BLD AUTO: 5.2 % — SIGNIFICANT CHANGE UP (ref 1.7–9.3)
NEUTROPHILS # BLD AUTO: 6.24 K/UL — SIGNIFICANT CHANGE UP (ref 1.4–6.5)
NEUTROPHILS NFR BLD AUTO: 74.4 % — SIGNIFICANT CHANGE UP (ref 42.2–75.2)
NRBC # BLD: 0 /100 WBCS — SIGNIFICANT CHANGE UP (ref 0–0)
PLATELET # BLD AUTO: 104 K/UL — LOW (ref 130–400)
POTASSIUM SERPL-MCNC: 4.3 MMOL/L — SIGNIFICANT CHANGE UP (ref 3.5–5)
POTASSIUM SERPL-MCNC: 4.5 MMOL/L — SIGNIFICANT CHANGE UP (ref 3.5–5)
POTASSIUM SERPL-SCNC: 4.3 MMOL/L — SIGNIFICANT CHANGE UP (ref 3.5–5)
POTASSIUM SERPL-SCNC: 4.5 MMOL/L — SIGNIFICANT CHANGE UP (ref 3.5–5)
PROT SERPL-MCNC: 5.5 G/DL — LOW (ref 6–8)
RBC # BLD: 3.77 M/UL — LOW (ref 4.7–6.1)
RBC # FLD: 12.7 % — SIGNIFICANT CHANGE UP (ref 11.5–14.5)
SODIUM SERPL-SCNC: 140 MMOL/L — SIGNIFICANT CHANGE UP (ref 135–146)
SODIUM SERPL-SCNC: 140 MMOL/L — SIGNIFICANT CHANGE UP (ref 135–146)
WBC # BLD: 8.4 K/UL — SIGNIFICANT CHANGE UP (ref 4.8–10.8)
WBC # FLD AUTO: 8.4 K/UL — SIGNIFICANT CHANGE UP (ref 4.8–10.8)

## 2020-02-11 PROCEDURE — 70450 CT HEAD/BRAIN W/O DYE: CPT | Mod: 26

## 2020-02-11 PROCEDURE — 93010 ELECTROCARDIOGRAM REPORT: CPT

## 2020-02-11 PROCEDURE — 71045 X-RAY EXAM CHEST 1 VIEW: CPT | Mod: 26

## 2020-02-11 PROCEDURE — 99232 SBSQ HOSP IP/OBS MODERATE 35: CPT

## 2020-02-11 RX ORDER — PANTOPRAZOLE SODIUM 20 MG/1
40 TABLET, DELAYED RELEASE ORAL
Refills: 0 | Status: DISCONTINUED | OUTPATIENT
Start: 2020-02-11 | End: 2020-02-15

## 2020-02-11 RX ADMIN — PANTOPRAZOLE SODIUM 40 MILLIGRAM(S): 20 TABLET, DELAYED RELEASE ORAL at 11:42

## 2020-02-11 RX ADMIN — Medication 12.5 MILLIGRAM(S): at 17:56

## 2020-02-11 RX ADMIN — Medication 40 MILLIEQUIVALENT(S): at 06:10

## 2020-02-11 RX ADMIN — CLOPIDOGREL BISULFATE 75 MILLIGRAM(S): 75 TABLET, FILM COATED ORAL at 11:42

## 2020-02-11 RX ADMIN — CHLORHEXIDINE GLUCONATE 1 APPLICATION(S): 213 SOLUTION TOPICAL at 06:09

## 2020-02-11 RX ADMIN — ENOXAPARIN SODIUM 40 MILLIGRAM(S): 100 INJECTION SUBCUTANEOUS at 11:42

## 2020-02-11 RX ADMIN — Medication 40 MILLIEQUIVALENT(S): at 00:38

## 2020-02-11 RX ADMIN — Medication 81 MILLIGRAM(S): at 11:42

## 2020-02-11 RX ADMIN — Medication 40 MILLIEQUIVALENT(S): at 02:45

## 2020-02-11 RX ADMIN — Medication 12.5 MILLIGRAM(S): at 06:10

## 2020-02-11 NOTE — PROGRESS NOTE ADULT - ATTENDING COMMENTS
I have personally seen and examined this patient.  I have fully participated in the care of this patient.  I have reviewed all pertinent clinical information, including history, physical exam, plan and note.   I have reviewed all pertinent clinical information and reviewed all relevant imaging and diagnostic studies personally. I agree with resident note above and plan of care, edited and corrected where applicable.     .. Acute myocardial infarction, planned cardiac angio and revascularization  .. Right lung opacity, no indication in favor of pneumonia clinically. differential diagnoses include edema, space occupying process.. etc    Plan is for cardiac monitoring; angio and revascularization,   remains in CCU on medical therapy and at high risk for cardiovascular events

## 2020-02-11 NOTE — PROGRESS NOTE ADULT - SUBJECTIVE AND OBJECTIVE BOX
Cardiology Follow up    LOKESH HUNT   85y Male  PAST MEDICAL & SURGICAL HISTORY:  No pertinent past medical history  No significant past surgical history       HPI:  84yo male traveling from [place] presented to ER with chill. In the ER found to have rapid atrial fibrillationalong. No chest pain (08 Feb 2020 23:00)    Allergies    No Known Allergies    Intolerances      Patient without complaints. Pt ambulated without issues/symptoms  Denies CP, SOB, palpitations, or dizziness  No events on telemetry overnight    Vital Signs Last 24 Hrs  T(C): 36 (11 Feb 2020 10:00), Max: 37.3 (10 Feb 2020 12:00)  T(F): 96.8 (11 Feb 2020 10:00), Max: 99.1 (10 Feb 2020 12:00)  HR: 68 (11 Feb 2020 10:00) (66 - 84)  BP: 97/685 (11 Feb 2020 10:00) (97/62 - 112/66)  BP(mean): 78 (11 Feb 2020 08:00) (64 - 81)  RR: 18 (11 Feb 2020 10:00) (18 - 25)  SpO2: 100% (11 Feb 2020 10:00) (100% - 100%)    MEDICATIONS  (STANDING):  aspirin  chewable 81 milliGRAM(s) Oral daily  atorvastatin 80 milliGRAM(s) Oral at bedtime  chlorhexidine 4% Liquid 1 Application(s) Topical <User Schedule>  clopidogrel Tablet 75 milliGRAM(s) Oral daily  enoxaparin Injectable 40 milliGRAM(s) SubCutaneous daily  metoprolol tartrate 12.5 milliGRAM(s) Oral two times a day  pantoprazole    Tablet 40 milliGRAM(s) Oral before breakfast  sodium chloride 0.9%. 1000 milliLiter(s) (50 mL/Hr) IV Continuous <Continuous>    MEDICATIONS  (PRN):      REVIEW OF SYSTEMS:          CONSTITUTIONAL: No weakness, fevers or chills          EYES/ENT: No visual changes;  No vertigo or throat pain           NECK: No pain or stiffness          RESPIRATORY: No cough, wheezing, hemoptysis          CARDIOVASCULAR: no pain, no COOPER, no palpitations           GASTROINTESTINAL: No abdominal or epigastric pain. No nausea, vomiting, or hematemesis;           GENITOURINARY: No dysuria, frequency or hematuria          NEUROLOGICAL: No numbness or weakness          SKIN: No itching, rashes    PHYSICAL EXAM:           CONSTITUTIONAL: Well-developed; well-nourished; in no acute distress  	SKIN: warm, dry  	HEAD: Normocephalic; atraumatic  	EYES: PERRL.  	ENT: No nasal discharge, airway clear, mucous membranes moist  	NECK: Supple; non tender.  	CARD: +S1, +S2, no murmurs, gallops, or rubs. (Regular) rate and rhythm    	RESP: coarse BS b/l  	ABD: soft ntnd, + BS x 4 quadrants  	EXT: moves all extremities,  no clubbing, cyanosis or edema  	NEURO: Alert and oriented x3, no focal deficits          PSYCH: Cooperative, appropriate          VASCULAR:  + Rad / + PTs / + DPs          EXTREMITY:             Right Groin:  Dressing removed, + pulses,  access site soft, no hematoma, no pain, no numbness, no signs and symptoms of infection            ECG: Ventricular Rate 65 BPM    Atrial Rate 65 BPM    P-R Interval 230 ms    QRS Duration 90 ms    Q-T Interval 410 ms    QTC Calculation(Bezet) 426 ms    P Axis 3 degrees    R Axis 16 degrees    T Axis -39 degrees    Diagnosis Line Sinus rhythm with 1st degree A-V block  Cannot rule out Inferior infarct , age undetermined  Abnormal ECG    Confirmed by JULIO C SALMERON MD [id number]) on 2/11/2020 8:10:50 AM                                                                                                                  2D ECHO: EXAM:  2-D ECHO (TTE) COMPLETE        PROCEDURE DATE:  02/09/2020      INTERPRETATION:  REPORT:    TRANSTHORACIC ECHOCARDIOGRAM REPORT         Patient Name:   LOKESH HUNT Accession #: [id number]  Medical Rec #:  [id number]       Height:      72.0in 182.9 cm  YOB: 1934       Weight:      209.0 lb 94.80 kg  Patient Age:    85 years        BSA:         2.17 m²  Patient Gender: M               BP:          98/55 mmHg       Date of Exam:        2/9/2020 7:47:55 AM  Referring Physician: [id number] ED UNASSIGNED  Sonographer:         ROBERT Corea Physician:   Bobby London M.D.    Procedure:    2D Echo/Doppler/Color Doppler Complete.  Indications:  I21.3 - ST Elevation STEMI Myocardial Infarction of unspecified  Diagnosis:    ST elevation (STEMI) myocardial infarction of unspecified site -                I21.3  Study         Technically good study.  Details:         Summary:   1. Left ventricular ejection fraction, by visual estimation, is 50 to 55%.   2. Spectral Doppler showsimpaired relaxation pattern of left ventricular myocardial filling (Grade I diastolic dysfunction).   3. Mild mitral valve regurgitation.   4. Mild aortic regurgitation.   5. There is moderate aortic root calcification.    PHYSICIAN INTERPRETATION:  Left Ventricle: The left ventricular internal cavity size is normal. Left ventricular wall thickness is normal. Left ventricular ejection fraction, by visual estimation, is 50 to 55%. Spectral Doppler shows impaired relaxation pattern of left ventricular myocardial filling (Grade I diastolic dysfunction).  Right Ventricle: The right ventricular size is mildly enlarged. RV systolic function is mildly reduced.  Left Atrium: Mildly enlarged left atrium.  Right Atrium: Mildly enlarged right atrium.  Mitral Valve: Structurally normal mitral valve, with normal leaflet excursion. No evidence of mitral valve stenosis. Mild mitral valve regurgitation is seen.  Tricuspid Valve: Structurally normal tricuspid valve, with normal leaflet excursion. No tricuspid regurgitation is visualized.  Aortic Valve: The aortic valve is trileaflet. No evidence of aortic stenosis. Mild aortic valve regurgitation is seen.  Pulmonic Valve: Structurally normal pulmonic valve, with normal leaflet excursion. Trace pulmonic valve regurgitation.  Aorta: The aortic root is normal in size and structure. There is moderate aortic root calcification.  Venous: The inferior vena cava was normal sized, with respiratory size variation greater than 50%.       2D AND M-MODE MEASUREMENTS (normal ranges within parentheses):  Left                  Normal   Aorta/Left             Normal  Ventricle:                     Atrium:  IVSd (2D):  1.15 cm  (0.7-1.1) AoV Cusp       1.59  (1.5-2.6)  LVPWd (2D): 1.08 cm  (0.7-1.1) Separation:    cm  LVIDd (2D): 4.76 cm  (3.4-5.7) Left Atrium    3.54  (1.9-4.0)  LVIDs (2D): 3.58 cm            (Mmode):        cm  LV FS (2D):  24.8 %   (>25%)   LA Volume      28.8  Relative      0.45    (<0.42)  Index         ml/m²  Wall      Right  Thickness                      Ventricle:                                 RVd (2D):      3.14 cm    SPECTRAL DOPPLER ANALYSIS:  LV DIASTOLIC FUNCTION:  MV Peak E: 0.46 m/s Decel Time: 152 msec  MV Peak A: 0.76 m/s  E/A Ratio: 0.61    Aortic Valve:  AoV VMax:    1.55 m/s AoV Area, Vmax: 2.20 cm² Vmax Indx: 1.01 cm²/m²  AoV Pk Grad: 9.6 mmHg    LVOT Vmax: 0.99 m/s  LVOT VTI:  0.21 m  LVOT Diam: 2.09 cm    Aortic Insufficiency:  AI Half-time:  415 msec  AI Decel Rate: 2.57 m/s²    Mitral Valve:  MV P1/2 Time: 44.01 msec  MV Area, PHT: 5.00 cm²    Pulmonic Valve:  PV Max Velocity: 0.88 m/s PV Max PG: 3.1 mmHg PV Mean PG:       [id number] Bobby London M.D., Electronically signed on 2/9/2020 at 2:40:50 PM              *** Final ***                    BOBBY LONDON MD  This document has been electronically signed. Feb 9 2020  7:47AM          LABS:                        12.4   8.40  )-----------( 104      ( 11 Feb 2020 05:16 )             36.9     02-11    140  |  106  |  12  ----------------------------<  101<H>  4.3   |  21  |  0.9    Ca    8.6      11 Feb 2020 05:16  Phos  2.4     02-10  Mg     2.2     02-11    TPro  5.5<L>  /  Alb  3.3<L>  /  TBili  1.1  /  DBili  x   /  AST  92<H>  /  ALT  38  /  AlkPhos  62  02-11    CARDIAC MARKERS ( 09 Feb 2020 11:00 )  x     / 2.50 ng/mL / 1547 U/L / x     / 198.8 ng/mL      Magnesium, Serum: 2.2 mg/dL [1.8 - 2.4] (02-11-20 @ 05:16)  LIVER FUNCTIONS - ( 11 Feb 2020 05:16 )  Alb: 3.3 g/dL / Pro: 5.5 g/dL / ALK PHOS: 62 U/L / ALT: 38 U/L / AST: 92 U/L / GGT: x           A/P:  I discussed the case with Cardiologist Dr. Rouse and recommend the following:    S/P PCI dRCA NILSON x 1/ 2V CAD (RCA and LAD)/STEMI  	     Continue DAPT (asa 81mg daily, plavix 75mg daily), BB, Statin Therapy                   no ACE due to hypotension                    monitor platelet level                   keep K>4, Mg>2                   trend LFT's/ CE                   daily  EKG                   Patient given 30 day supply of ( Aspirin 81 mg daily and Plavix 75 mg daily ) to take at home                   Patient agreeing to take DAPT for at least one year or as directed by cardiologist                    Pt given instructions on importance of taking antiplatelet medication or risk acute stent thrombosis/death                   Post cath instructions, access site care and activity restrictions reviewed with patient                     Discussed with patient to return to hospital if experience chest pain, shortness breath, dizziness and site bleeding                   Aggressive risk factor modification, diet counseling, smoking cessation discussed with patient                     Keep NPO after midnight tonight for staged PCI in am                   hold am dose of lovenox in am on 2/12 prior to cath                     pt will need staged PCI of LAD on Wed f/u with Dr. Rouse

## 2020-02-11 NOTE — CHART NOTE - NSCHARTNOTEFT_GEN_A_CORE
Was called by nurse to evaluate patient for acute change in mental status after PCA found patient ambulating asking where he was. Patient was resting comfortable in bed at time of examination: VSS, 126/79, HR 70 regular rhythm, saturating 96% on room air; fingerstick was 100. Patient did not complain of any CP, palpitations, SOB, extremity weakness. He did acknowledge that he felt confused. Could tell me his name, date of birth and that it was February, however, unable to accurately name the year or where he was - stated he was home and that he didn't understand how he got here. Neuro examination was unremarkable. CNII-XII intact. SILVESTRE. 5/5 muscle strength BL UE/LE, sensation intact BL UE/le. Was able to follow multi-step commands. No focal deficits identified.  Medications and labs reviewed from today; no acute abnormalities.    Will order stat CT head, UA, and BMP and continue to monitor closely.

## 2020-02-12 LAB
ALBUMIN SERPL ELPH-MCNC: 3.6 G/DL — SIGNIFICANT CHANGE UP (ref 3.5–5.2)
ALP SERPL-CCNC: 68 U/L — SIGNIFICANT CHANGE UP (ref 30–115)
ALT FLD-CCNC: 32 U/L — SIGNIFICANT CHANGE UP (ref 0–41)
ANION GAP SERPL CALC-SCNC: 15 MMOL/L — HIGH (ref 7–14)
APPEARANCE UR: CLEAR — SIGNIFICANT CHANGE UP
AST SERPL-CCNC: 52 U/L — HIGH (ref 0–41)
BASOPHILS # BLD AUTO: 0.01 K/UL — SIGNIFICANT CHANGE UP (ref 0–0.2)
BASOPHILS NFR BLD AUTO: 0.2 % — SIGNIFICANT CHANGE UP (ref 0–1)
BILIRUB SERPL-MCNC: 1.1 MG/DL — SIGNIFICANT CHANGE UP (ref 0.2–1.2)
BILIRUB UR-MCNC: NEGATIVE — SIGNIFICANT CHANGE UP
BUN SERPL-MCNC: 14 MG/DL — SIGNIFICANT CHANGE UP (ref 10–20)
CALCIUM SERPL-MCNC: 8.9 MG/DL — SIGNIFICANT CHANGE UP (ref 8.5–10.1)
CHLORIDE SERPL-SCNC: 102 MMOL/L — SIGNIFICANT CHANGE UP (ref 98–110)
CO2 SERPL-SCNC: 21 MMOL/L — SIGNIFICANT CHANGE UP (ref 17–32)
COLOR SPEC: SIGNIFICANT CHANGE UP
CREAT SERPL-MCNC: 0.9 MG/DL — SIGNIFICANT CHANGE UP (ref 0.7–1.5)
DIFF PNL FLD: NEGATIVE — SIGNIFICANT CHANGE UP
EOSINOPHIL # BLD AUTO: 0.16 K/UL — SIGNIFICANT CHANGE UP (ref 0–0.7)
EOSINOPHIL NFR BLD AUTO: 2.4 % — SIGNIFICANT CHANGE UP (ref 0–8)
GLUCOSE SERPL-MCNC: 97 MG/DL — SIGNIFICANT CHANGE UP (ref 70–99)
GLUCOSE UR QL: NEGATIVE — SIGNIFICANT CHANGE UP
HCT VFR BLD CALC: 39.7 % — LOW (ref 42–52)
HGB BLD-MCNC: 13.2 G/DL — LOW (ref 14–18)
IMM GRANULOCYTES NFR BLD AUTO: 0.2 % — SIGNIFICANT CHANGE UP (ref 0.1–0.3)
KETONES UR-MCNC: ABNORMAL
LEUKOCYTE ESTERASE UR-ACNC: NEGATIVE — SIGNIFICANT CHANGE UP
LYMPHOCYTES # BLD AUTO: 1.74 K/UL — SIGNIFICANT CHANGE UP (ref 1.2–3.4)
LYMPHOCYTES # BLD AUTO: 26.2 % — SIGNIFICANT CHANGE UP (ref 20.5–51.1)
MAGNESIUM SERPL-MCNC: 2.2 MG/DL — SIGNIFICANT CHANGE UP (ref 1.8–2.4)
MCHC RBC-ENTMCNC: 32.7 PG — HIGH (ref 27–31)
MCHC RBC-ENTMCNC: 33.2 G/DL — SIGNIFICANT CHANGE UP (ref 32–37)
MCV RBC AUTO: 98.3 FL — HIGH (ref 80–94)
MONOCYTES # BLD AUTO: 0.35 K/UL — SIGNIFICANT CHANGE UP (ref 0.1–0.6)
MONOCYTES NFR BLD AUTO: 5.3 % — SIGNIFICANT CHANGE UP (ref 1.7–9.3)
NEUTROPHILS # BLD AUTO: 4.36 K/UL — SIGNIFICANT CHANGE UP (ref 1.4–6.5)
NEUTROPHILS NFR BLD AUTO: 65.7 % — SIGNIFICANT CHANGE UP (ref 42.2–75.2)
NITRITE UR-MCNC: NEGATIVE — SIGNIFICANT CHANGE UP
NRBC # BLD: 0 /100 WBCS — SIGNIFICANT CHANGE UP (ref 0–0)
PH UR: 6 — SIGNIFICANT CHANGE UP (ref 5–8)
PLATELET # BLD AUTO: 135 K/UL — SIGNIFICANT CHANGE UP (ref 130–400)
POTASSIUM SERPL-MCNC: 4.1 MMOL/L — SIGNIFICANT CHANGE UP (ref 3.5–5)
POTASSIUM SERPL-SCNC: 4.1 MMOL/L — SIGNIFICANT CHANGE UP (ref 3.5–5)
PROT SERPL-MCNC: 5.7 G/DL — LOW (ref 6–8)
PROT UR-MCNC: NEGATIVE — SIGNIFICANT CHANGE UP
RBC # BLD: 4.04 M/UL — LOW (ref 4.7–6.1)
RBC # FLD: 12.5 % — SIGNIFICANT CHANGE UP (ref 11.5–14.5)
SODIUM SERPL-SCNC: 138 MMOL/L — SIGNIFICANT CHANGE UP (ref 135–146)
SP GR SPEC: 1.02 — SIGNIFICANT CHANGE UP (ref 1.01–1.02)
UROBILINOGEN FLD QL: SIGNIFICANT CHANGE UP
WBC # BLD: 6.63 K/UL — SIGNIFICANT CHANGE UP (ref 4.8–10.8)
WBC # FLD AUTO: 6.63 K/UL — SIGNIFICANT CHANGE UP (ref 4.8–10.8)

## 2020-02-12 PROCEDURE — 71250 CT THORAX DX C-: CPT | Mod: 26

## 2020-02-12 PROCEDURE — 93880 EXTRACRANIAL BILAT STUDY: CPT | Mod: 26

## 2020-02-12 PROCEDURE — 71045 X-RAY EXAM CHEST 1 VIEW: CPT | Mod: 26

## 2020-02-12 PROCEDURE — 99233 SBSQ HOSP IP/OBS HIGH 50: CPT

## 2020-02-12 PROCEDURE — 93010 ELECTROCARDIOGRAM REPORT: CPT

## 2020-02-12 RX ORDER — SODIUM CHLORIDE 9 MG/ML
1000 INJECTION INTRAMUSCULAR; INTRAVENOUS; SUBCUTANEOUS
Refills: 0 | Status: DISCONTINUED | OUTPATIENT
Start: 2020-02-12 | End: 2020-02-14

## 2020-02-12 RX ADMIN — Medication 81 MILLIGRAM(S): at 10:26

## 2020-02-12 RX ADMIN — PANTOPRAZOLE SODIUM 40 MILLIGRAM(S): 20 TABLET, DELAYED RELEASE ORAL at 06:21

## 2020-02-12 RX ADMIN — CHLORHEXIDINE GLUCONATE 1 APPLICATION(S): 213 SOLUTION TOPICAL at 06:17

## 2020-02-12 RX ADMIN — ATORVASTATIN CALCIUM 80 MILLIGRAM(S): 80 TABLET, FILM COATED ORAL at 00:32

## 2020-02-12 RX ADMIN — ATORVASTATIN CALCIUM 80 MILLIGRAM(S): 80 TABLET, FILM COATED ORAL at 21:40

## 2020-02-12 RX ADMIN — CLOPIDOGREL BISULFATE 75 MILLIGRAM(S): 75 TABLET, FILM COATED ORAL at 10:26

## 2020-02-12 RX ADMIN — Medication 12.5 MILLIGRAM(S): at 06:17

## 2020-02-12 RX ADMIN — Medication 12.5 MILLIGRAM(S): at 18:06

## 2020-02-12 NOTE — PROGRESS NOTE ADULT - SUBJECTIVE AND OBJECTIVE BOX
Cardiology Follow up    LOKESH HUNT   85y Male  PAST MEDICAL & SURGICAL HISTORY:  No pertinent past medical history  No significant past surgical history       HPI:  86yo male traveling from [place] presented to ER with chill. In the ER found to have rapid atrial fibrillationalong. No chest pain (08 Feb 2020 23:00)    Allergies    No Known Allergies      Pt seen and examined Patient without complaints. Denies CP, SOB, palpitations, or dizziness  No events on telemetry overnight    Vital Signs Last 24 Hrs  T(C): 36.7 (12 Feb 2020 08:00), Max: 37 (11 Feb 2020 20:00)  T(F): 98.1 (12 Feb 2020 08:00), Max: 98.6 (11 Feb 2020 20:00)  HR: 60 (12 Feb 2020 10:00) (56 - 94)  BP: 107/67 (12 Feb 2020 10:00) (105/58 - 127/75)  BP(mean): 88 (12 Feb 2020 10:00) (67 - 101)  RR: 24 (12 Feb 2020 10:00) (20 - 39)  SpO2: 94% (12 Feb 2020 10:00) (94% - 100%)    MEDICATIONS  (STANDING):  aspirin  chewable 81 milliGRAM(s) Oral daily  atorvastatin 80 milliGRAM(s) Oral at bedtime  chlorhexidine 4% Liquid 1 Application(s) Topical <User Schedule>  clopidogrel Tablet 75 milliGRAM(s) Oral daily  metoprolol tartrate 12.5 milliGRAM(s) Oral two times a day  pantoprazole    Tablet 40 milliGRAM(s) Oral before breakfast  sodium chloride 0.9%. 1000 milliLiter(s) (50 mL/Hr) IV Continuous <Continuous>    MEDICATIONS  (PRN):      PHYSICAL EXAM:           CONSTITUTIONAL: Well-developed; well-nourished; in no acute distress  	SKIN: warm, dry  	HEAD: Normocephalic; atraumatic  	ENT: No nasal discharge, airway clear, mucous membranes moist  	NECK: Supple; non tender.  	CARD: +S1, +S2, no murmurs, gallops, or rubs. Regular rate and rhythm    	RESP: No wheezes, rales or rhonchi. CTA B/L  	ABD: soft ntnd, + BS x 4 quadrants  	EXT: moves all extremities,  no clubbing, cyanosis or edema  	NEURO: Alert and oriented x3, no focal deficits          PSYCH: Cooperative, appropriate          VASCULAR:  +2 Rad / +2 PTs / + 2 DPs          EXTREMITY:             Right Groin:  Access site soft, no hematoma, no pain, + pulses/same as baseline, no sign of infection, no numbness  	              ECG:          < from: 12 Lead ECG (02.12.20 @ 07:12) >    Ventricular Rate 59 BPM    Atrial Rate 59 BPM    P-R Interval 218 ms    QRS Duration 90 ms    Q-T Interval 432 ms    QTC Calculation(Bezet) 427 ms    P Axis -16 degrees    R Axis -4 degrees    T Axis -22 degrees    Diagnosis Line Sinus bradycardia with 1st degree A-V block  Inferior infarct , age undetermined  Abnormal ECG    Confirmed by Bobby Duval ([id number]) on 2/12/2020 8:55:49 AM    < end of copied text >                                                                                                         2D ECHO:  < from: Transthoracic Echocardiogram (02.09.20 @ 07:47) >   EXAM:  2-D ECHO (TTE) COMPLETE        PROCEDURE DATE:  02/09/2020      INTERPRETATION:  REPORT:    TRANSTHORACIC ECHOCARDIOGRAM REPORT         Patient Name:   LOKESH HUNT Accession #: [id number]  Medical Rec #:  [id number]       Height:      72.0in 182.9 cm  YOB: 1934       Weight:      209.0 lb 94.80 kg  Patient Age:    85 years        BSA:         2.17 m²  Patient Gender: M               BP:          98/55 mmHg       Date of Exam:        2/9/2020 7:47:55 AM  Referring Physician: [id number] ED UNASSIGNED  Sonographer:         ROBERT Corea Physician:   Bobby Duval M.D.    Procedure:    2D Echo/Doppler/Color Doppler Complete.  Indications:  I21.3 - ST Elevation STEMI Myocardial Infarction of unspecified  Diagnosis:    ST elevation (STEMI) myocardial infarction of unspecified site -                I21.3  Study         Technically good study.  Details:         Summary:   1. Left ventricular ejection fraction, by visual estimation, is 50 to 55%.   2. Spectral Doppler showsimpaired relaxation pattern of left ventricular myocardial filling (Grade I diastolic dysfunction).   3. Mild mitral valve regurgitation.   4. Mild aortic regurgitation.   5. There is moderate aortic root calcification.    PHYSICIAN INTERPRETATION:  Left Ventricle: The left ventricular internal cavity size is normal. Left ventricular wall thickness is normal. Left ventricular ejection fraction, by visual estimation, is 50 to 55%. Spectral Doppler shows impaired relaxation pattern of left ventricular myocardial filling (Grade I diastolic dysfunction).  Right Ventricle: The right ventricular size is mildly enlarged. RV systolic function is mildly reduced.  Left Atrium: Mildly enlarged left atrium.  Right Atrium: Mildly enlarged right atrium.  Mitral Valve: Structurally normal mitral valve, with normal leaflet excursion. No evidence of mitral valve stenosis. Mild mitral valve regurgitation is seen.  Tricuspid Valve: Structurally normal tricuspid valve, with normal leaflet excursion. No tricuspid regurgitation is visualized.  Aortic Valve: The aortic valve is trileaflet. No evidence of aortic stenosis. Mild aortic valve regurgitation is seen.  Pulmonic Valve: Structurally normal pulmonic valve, with normal leaflet excursion. Trace pulmonic valve regurgitation.  Aorta: The aortic root is normal in size and structure. There is moderate aortic root calcification.  Venous: The inferior vena cava was normal sized, with respiratory size variation greater than 50%.       2D AND M-MODE MEASUREMENTS (normal ranges within parentheses):  Left                  Normal   Aorta/Left             Normal  Ventricle:                     Atrium:  IVSd (2D):  1.15 cm  (0.7-1.1) AoV Cusp       1.59  (1.5-2.6)  LVPWd (2D): 1.08 cm  (0.7-1.1) Separation:    cm  LVIDd (2D): 4.76 cm  (3.4-5.7) Left Atrium    3.54  (1.9-4.0)  LVIDs (2D): 3.58 cm            (Mmode):        cm  LV FS (2D):  24.8 %   (>25%)   LA Volume      28.8  Relative      0.45    (<0.42)  Index         ml/m²  Wall      Right  Thickness                      Ventricle:                                 RVd (2D):      3.14 cm    SPECTRAL DOPPLER ANALYSIS:  LV DIASTOLIC FUNCTION:  MV Peak E: 0.46 m/s Decel Time: 152 msec  MV Peak A: 0.76 m/s  E/A Ratio: 0.61    Aortic Valve:  AoV VMax:    1.55 m/s AoV Area, Vmax: 2.20 cm² Vmax Indx: 1.01 cm²/m²  AoV Pk Grad: 9.6 mmHg    LVOT Vmax: 0.99 m/s  LVOT VTI:  0.21 m  LVOT Diam: 2.09 cm    Aortic Insufficiency:  AI Half-time:  415 msec  AI Decel Rate: 2.57 m/s²    Mitral Valve:  MV P1/2 Time: 44.01 msec  MV Area, PHT: 5.00 cm²    Pulmonic Valve:  PV Max Velocity: 0.88 m/s PV Max PG: 3.1 mmHg PV Mean PG:       [id number] Bobby Duval M.D., Electronically signed on 2/9/2020 at 2:40:50 PM       LABS:                        13.2   6.63  )-----------( 135      ( 12 Feb 2020 04:50 )             39.7     02-12    138  |  102  |  14  ----------------------------<  97  4.1   |  21  |  0.9    Ca    8.9      12 Feb 2020 04:50  Mg     2.2     02-12    TPro  5.7<L>  /  Alb  3.6  /  TBili  1.1  /  DBili  x   /  AST  52<H>  /  ALT  32  /  AlkPhos  68  02-12        Magnesium, Serum: 2.2 mg/dL [1.8 - 2.4] (02-12-20 @ 04:50)  LIVER FUNCTIONS - ( 12 Feb 2020 04:50 )  Alb: 3.6 g/dL / Pro: 5.7 g/dL / ALK PHOS: 68 U/L / ALT: 32 U/L / AST: 52 U/L / GGT: x             A/P:  I discussed the case with Cardiologist Dr. Rouse and recommend the following:    S/P PCI dRCA NILSON x 1/ 2V CAD (RCA and LAD)/STEMI                    pt scheduled for  staged PCI of LAD today                     Monitor access site  	     Continue DAPT (asa 81mg daily, plavix 75mg daily), BB, Statin Therapy                   no ACE due to hypotension/reevaluate when sbp > 120                    continue to monitor platelet level                   keep K>4, Mg>2                   trend LFT's/ CE                   daily  EKG                   Patient given 30 day supply of ( Aspirin 81 mg daily and Plavix 75 mg daily ) to take at home                   Patient agreeing to take DAPT for at least one year or as directed by cardiologist                    Pt given instructions on importance of taking antiplatelet medication or risk acute stent thrombosis/death                   Post cath instructions, access site care and activity restrictions reviewed with patient                     Discussed with patient to return to hospital if experience chest pain, shortness breath, dizziness and site bleeding                   Aggressive risk factor modification, diet counseling, smoking cessation discussed with patient                       f/u with Dr. Rouse

## 2020-02-12 NOTE — CHART NOTE - NSCHARTNOTEFT_GEN_A_CORE
Preliminary Cardiac Catheterization Post-Procedure Report:02-12-20 @ 13:40    After risks, benefits and alternatives of the procedure were explained, consent was signed and placed in the medical record prior to initiation of procedure.  Procedural timeout was performed prior to initiation of procedure in presence of cath lab staff. Cardiac catheterization was performed without any significant complications. Post-procedure vital signs were stable.    Procedure Performed:  [x ] Left Heart Catheterization  [ ] Right Heart Catheterization  [ ] Percutaneous Coronary Intervention    Primary Physician: Dr. Padma MD  Assistant(s): Dr. Vlad MD and  Dr. Renetta MD    Preliminary Procedure Summary (Official full report to follow)    Pre-procedure diagnosis: CAD  Post Procedure Diagnosis/Impression: sig LM, LAD and RPDA disease    Left Heart Catheterization:  approximate EF%: n/a  [ ] Normal Coronary Arteries  [ ] Luminal Irregularities  [ ] non-obstructive CAD  [x ] 2 vessel coronary artery disease       Anesthesia Type  [x] conscious sedation  [x] local/regional anesthesia  [  ] general anesthesia    Estimated Blood Loss  [x ] less than 30 ml    Amount of Contrast used:  60 ml    Access  [x ] Lft. Femoral A --> manual compression.  [ ] Rt. Femoral V  [ ] Rt. Radial A  [ ] Rt. Brachial V    Condition of patient after procedure  [x] stable  [  ] guarded  [  ] satisfactory     CATH SUMMARY/FINDINGS:    Coronary circulation: The coronary circulation is right dominant. There was significant left main disease. There was 2-vessel coronary artery disease (LAD and RCA). Left main: Angiography showed a single discrete lesion. Distal left main: There was a tubular 70 % stenosis. LAD: The vessel was heavily calcified. Angiography showed multiple discrete lesions. Proximal LAD: There was a diffuse 70 % stenosis. Mid LAD: There was a tubular 85 % stenosis. Distal vessel angiography showed minor luminal irregularities. Circumflex: Angiography showed minor luminal irregularities with no flow limiting lesions. Proximal circumflex: The vessel had an excessively ectatic focal segment. Ramus intermedius: Angiography showed minor luminal irregularities with no flow limiting lesions. Proximal RCA: Angiography showed moderate atherosclerosis with no clinical lesions appreciated. Mid RCA: Angiography showed minor luminal irregularities with no flow limiting lesions. Distal RCA: Angiography showed patent stent. Right PDA: Angiography showed a single discrete lesion. There was a tubular 95 % stenosis at the ostium of the vessel segment.     Lesion intervention: A percutaneous intervention was performed on the lesion in the left main. There was no dissection.   Intravascular ultrasound was performed.     Specimens obtained: n/a    Implants: n/a    Follow-up Care:  [ ] D/C Home today  [x ] Return to In-patient bed  [ ] Admit to:  [ ] Return for staged procedure:  [x ] CT Surgery consult called  [ ] DAPT, statin, BB, ACEI  [x ] intensive medical management  [ ] ** EPS/nephrology evaluation requested

## 2020-02-12 NOTE — PROGRESS NOTE ADULT - SUBJECTIVE AND OBJECTIVE BOX
Hospital Day:  3d    Chief Complaint: Patient is a 85y old Male with no PMH who presents with cough, subsequently found to have inferior wall STEMI.    24 hour events:  -No acute events overnight  -Patient had an episode of confusion overnight yesterday. CTHNC negative for acute intracranial pathology, no abnormalities on BMP  -Patient's mental status improved this AM    Subjective:  -Patient states he was feeling confused last night but is feeling better this AM  -denies chest pain, palpitations, shortness of breath, fevers/chills, nausea/vomiting, lightheadedness/dizziness    Past Medical Hx:   No pertinent past medical history    Past Sx:  No significant past surgical history    Allergies:  No Known Allergies    Current Meds:   Standing Meds:  aspirin  chewable 81 milliGRAM(s) Oral daily  atorvastatin 80 milliGRAM(s) Oral at bedtime  chlorhexidine 4% Liquid 1 Application(s) Topical <User Schedule>  clopidogrel Tablet 75 milliGRAM(s) Oral daily  metoprolol tartrate 12.5 milliGRAM(s) Oral two times a day  pantoprazole    Tablet 40 milliGRAM(s) Oral before breakfast  sodium chloride 0.9%. 1000 milliLiter(s) (50 mL/Hr) IV Continuous <Continuous>    PRN Meds:    HOME MEDICATIONS:      Vital Signs:   T(F): 98.1 (20 @ 08:00), Max: 98.6 (20 @ 20:00)  HR: 56 (20 @ 08:00) (56 - 94)  BP: 109/65 (20 @ 08:00) (105/58 - 127/75)  RR: 21 (20 @ 08:00) (20 - 39)  SpO2: 97% (20 @ 08:00) (95% - 100%)      20 @ 07:01  -  20 @ 07:00  --------------------------------------------------------  IN: 840 mL / OUT: 1100 mL / NET: -260 mL    20 @ 07:01  -  20 @ 11:08  --------------------------------------------------------  IN: 0 mL / OUT: 150 mL / NET: -150 mL    Physical Exam:   GENERAL: NAD  HEENT: NCAT  CHEST/LUNG: CTAB  HEART: Regular rate and rhythm; s1 s2 appreciated, No murmurs, rubs, or gallops  ABDOMEN: Soft, Nontender, Nondistended; Bowel sounds present  EXTREMITIES: No LE edema b/l  NERVOUS SYSTEM:  Alert & Oriented X3 - knows where he is, what year it is, who [facility] president is, and his name/. Remembers episode of confusion overnight but states he feels much less confused this AM.    Labs:                         13.2   6.63  )-----------( 135      ( 2020 04:50 )             39.7     Neutophil% 65.7, Lymphocyte% 26.2, Monocyte% 5.3, Bands% 0.2 20 @ 04:50    2020 04:50    138    |  102    |  14     ----------------------------<  97     4.1     |  21     |  0.9      Ca    8.9        2020 04:50  Mg     2.2       2020 04:50    TPro  5.7    /  Alb  3.6    /  TBili  1.1    /  DBili  x      /  AST  52     /  ALT  32     /  AlkPhos  68     2020 04:50      Hemoglobin A1C, Whole Blood: 5.8 % (20 @ 11:00)    Serum Pro-Brain Natriuretic Peptide: 444 pg/mL (20 @ 01:50)    Troponin 2.50, CKMB 198.8, CK 1547 20 @ 11:00  Troponin 0.06, CKMB --, CK -- 20 @ 01:50  Troponin <0.01, CKMB --, CK -- 20 @ 23:00    Urinalysis Basic - ( 2020 00:00 )    Color: Light Yellow / Appearance: Clear / S.017 / pH: x  Gluc: x / Ketone: Small  / Bili: Negative / Urobili: <2 mg/dL   Blood: x / Protein: Negative / Nitrite: Negative   Leuk Esterase: Negative / RBC: x / WBC x   Sq Epi: x / Non Sq Epi: x / Bacteria: x    Culture - Blood (collected 20 @ 21:13)  Source: .Blood Blood-Peripheral  Preliminary Report (02-10-20 @ 17:02):    No growth to date.    Culture - Blood (collected 20 @ 21:13)  Source: .Blood Blood-Peripheral  Preliminary Report (02-10-20 @ 17:02):    No growth to date.    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, mid circ no disease, distal circ no disease     Right Coronary Artery: prox RCA mild disease, mid RCA moderate disease, distal % occlusion ZEFERINO 0 flow    seen after achieving reperfusion  RPDA: thrombus/significant stenosis 95% at the branch point of RPDA from distal RCA ZEFERINO 3 flow, rest of vessel no disease  RPL: no disease    Ramus Intermed: no disease    DOMINANCE: Right    ACCESS: Right femoral artery  CLOSURE: Perclose     INTERVENTION  IMPLANTS: Synergy 3.0x12mm to distal % lesion    POST-OP DIAGNOSIS  LM disease, 2V disease (RCA and LAD)    < from: Transthoracic Echocardiogram (20 @ 07:47) >  Summary:   1. Left ventricular ejection fraction, by visual estimation, is 50 to 55%.   2. Spectral Doppler showsimpaired relaxation pattern of left ventricular myocardial filling (Grade I diastolic dysfunction).   3. Mild mitral valve regurgitation.   4. Mild aortic regurgitation.   5. There is moderate aortic root calcification.    < end of copied text >    Assessment and Plan:   85y old Male with no PMH who presents with cough, subsequently found to have inferior wall STEMI    #No clinical evidence of pneumonia.   -Right opacity on CXR, wide differential diagnoses  -Will plan for repeat CXR (PA and lateral if possible)    #Inferior wall MI:  -s/p emergent cath on  with RCA implant  -has LM 40% and LAD 80% occlusion  -cardiology recs noted: will need IVUS of left main, and if negative, will need PCI of LAD, planned for today   -will f/u further cardiology recs post-cath  -continue with DAPT and lipitor 80mg qd  -continue with metoprolol 12.5mg bid    #altered mental status  -CTH negative for acute intracranial pathology  -BMP wnl  -likely ICU delirium vs  since mental status is improved this AM    #New onset paroxysmalAFib w/ RVR  -currently in NSR, no runs of Afib overnight  -continue with metoprolol 12.5mg bid  -holding off on a/c as AFib thought to be transient and related to recent catheterization   _____________________________________________  DVT ppx: lovenox subq  GI ppx: not indicated  Activity: ambulate as tolerated  Diet: DASH/TLC  Code Status: full code    DISPO: pending further management of LAD/LM occlusions; from home

## 2020-02-12 NOTE — CONSULT NOTE ADULT - SUBJECTIVE AND OBJECTIVE BOX
Surgeon: /Anuradha/ Radha    Consult requesting by: Dr. Rouse    HISTORY OF PRESENT ILLNESS:  84 y/o M with PMH of pancreatic and liver cancer in remission since 40 years ago presented initially for new onset rigors an hour prior to presentation associated with lightheadedness and cough. Patient lives in [place] and is fully functional and takes care of all ADL without restrictions. Patient was initially treated for pneumonia and new onset with RVR. Pt converted back to sinus without intervention. Repeat EKG is done showing acute inferior STEMI. Pt was transferred to [facility] for cath in which RCA stent was place. 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     NYHA functional class    [ ] Class I (no limitation) [ ] Class II (slight limitation) [ ] Class III (marked limitation) [ ] Class IV (symptoms at rest)    PAST MEDICAL & SURGICAL HISTORY:  No pertinent past medical history  No significant past surgical history      MEDICATIONS  (STANDING):  aspirin  chewable 81 milliGRAM(s) Oral daily  atorvastatin 80 milliGRAM(s) Oral at bedtime  chlorhexidine 4% Liquid 1 Application(s) Topical <User Schedule>  clopidogrel Tablet 75 milliGRAM(s) Oral daily  metoprolol tartrate 12.5 milliGRAM(s) Oral two times a day  pantoprazole    Tablet 40 milliGRAM(s) Oral before breakfast  sodium chloride 0.9%. 1000 milliLiter(s) (50 mL/Hr) IV Continuous <Continuous>    MEDICATIONS  (PRN):    Antiplatelet therapy:      plavix                      Last dose/amt: 75mg    Allergies    No Known Allergies    Intolerances        SOCIAL HISTORY:  Smoker: [ ] Yes  [ x] No        PACK YEARS:                         WHEN QUIT?  ETOH use: [ ] Yes  [ ]x No              FREQUENCY / QUANTITY:  Ilicit Drug use:  [ ] Yes  [ x] No  Occupation: retired  Lives with: wife in [place]  Assisted device use:   5 meter walk test: 1____sec, 2____sec, 3___sec  FAMILY HISTORY:      Review of Systems  CONSTITUTIONAL:  Fevers[ ] chills[ ] sweats[ ] fatigue[ ] weight loss[ ] weight gain [ ]                                     NEGATIVE [X ]   NEURO:  parathesias[ ] seizures [ ]  syncope [ ]  confusion [ ]                                                                                NEGATIVE[ X]   EYES: glasses[ ]  blurry vision[ ]  discharge[ ] pain[ ] glaucoma [ ]                                                                          NEGATIVE[X ]   ENMT:  difficulty hearing [ ]  vertigo[ ]  dysphagia[ ] epistaxis[ ] recent dental work [ ]                                    NEGATIVE[ X]   CV:  chest pain[ ] palpitations[ ] COOPER [ ] diaphoresis [ ]                                                                                           NEGATIVE[ X]   RESPIRATORY:  wheezing[ ] SOB[ ] cough [ ] sputum[ ] hemoptysis[ ]                                                                  NEGATIVE[ ]   GI:  nausea[ ]  vommiting [ ]  diarrhea[ ] constipation [ ] melena [ ]                                                                      NEGATIVE[ X]   : hematuria[ ]  dysuria[ ] urgency[ ] incontinence[ ]                                                                                            NEGATIVE[ X]   MUSKULOSKELETAL:  arthritis[ ]  joint swelling [ ] muscle weakness [ ] Hx vein stripping [ ]                             NEGATIVE[X ]   SKIN/BREAST:  rash[ ] itching [ ]  hair loss[ ] masses[ ]                                                                                              NEGATIVE[ X]   PSYCH:  dementia [ ] depresion [ ] anxiety[ ]                                                                                                               NEGATIVE[X ]   HEME/LYMPH:  bruises easily[ ] enlarged lymph nodes[ ] tender lymph nodes[ ]                                               NEGATIVE[ X]   ENDOCRINE:  cold intolerance[ ] heat intolerance[ ] polydipsia[ ]                                                                          NEGATIVE[ X]     PHYSICAL EXAM  Vital Signs Last 24 Hrs  T(C): 36.7 (2020 08:00), Max: 37 (2020 20:00)  T(F): 98.1 (2020 08:00), Max: 98.6 (2020 20:00)  HR: 60 (2020 10:00) (56 - 94)  BP: 107/67 (2020 10:00) (106/64 - 127/75)  BP(mean): 88 (2020 10:00) (71 - 101)  RR: 24 (2020 10:00) (20 - 39)  SpO2: 94% (2020 10:00) (94% - 100%)      CONSTITUTIONAL:                                                                          WNL[x ]   Neuro: WNL[x ] Normal exam oriented to person/place & time with no focal motor or sensory  deficits. Other                     Eyes: WNL[x ]   Normal exam of conjunctiva & lids, pupils equally reactive. Other     ENT: WNL[x ]    Normal exam of nasal/oral mucosa with absence of cyanosis. Other  Neck: WNL[ x]  Normal exam of jugular veins, trachea & thyroid. Other  Chest: WNL[x] Normal lung exam with good air movement absence of wheezes, rales, or rhonchi: Other                                                                                CV:  Auscultation: normal [ x] S3[ ] S4[ ] Irregular [ ] Rub[ ] Clicks[ ]    Murmurs none:[x ]systolic [ ]  diastolic [ ] holosystolic [ ]  Carotids: No Bruits[x ] Other                 Abdominal Aorta: normal [ ] nonpalpable[ ]Other                                                                                      GI:           WNL[x ] Normal exam of abdomen, liver & spleen with no noted masses or tenderness. Other                                                                                                        Extremities: WNL[ x] Normal no evidence of cyanosis or deformity Edema: none[ ]trace[ ]1+[ ]2+[ ]3+[ ]4+[ ]  Lower Extremity Pulses: Right[ ] Left[ ]Varicosities[ ]  SKIN :WNL[x ] Normal exam to inspection & palation. Other:                                                          LABS:                        13.2   6.63  )-----------( 135      ( 2020 04:50 )             39.7     02-    138  |  102  |  14  ----------------------------<  97  4.1   |  21  |  0.9    Ca    8.9      2020 04:50  Mg     2.2     0212    TPro  5.7<L>  /  Alb  3.6  /  TBili  1.1  /  DBili  x   /  AST  52<H>  /  ALT  32  /  AlkPhos  68  02-12      Urinalysis Basic - ( 2020 00:00 )    Color: Light Yellow / Appearance: Clear / S.017 / pH: x  Gluc: x / Ketone: Small  / Bili: Negative / Urobili: <2 mg/dL   Blood: x / Protein: Negative / Nitrite: Negative   Leuk Esterase: Negative / RBC: x / WBC x   Sq Epi: x / Non Sq Epi: x / Bacteria: x              Cardiac Cath: The coronary circulation is right dominant. There was significant left main disease. There was 2-vessel coronary artery disease (LAD and RCA). Left main: Angiography showed a single discrete lesion. Distal left main: There was a tubular 70 % stenosis. LAD: The vessel was heavily calcified. Angiography showed multiple discrete lesions. Proximal LAD: There was a diffuse 70 % stenosis. Mid LAD: There was a tubular 85 % stenosis. Distal vessel angiography showed minor luminal irregularities. Circumflex: Angiography showed minor luminal irregularities with no flow limiting lesions. Proximal circumflex: The vessel had an excessively ectatic focal segment. Ramus intermedius: Angiography showed minor luminal irregularities with no flow limiting lesions. Proximal RCA: Angiography showed moderate atherosclerosis with no clinical lesions appreciated. Mid RCA: Angiography showed minor luminal irregularities with no flow limiting lesions. Distal RCA: Angiography showed patent stent. Right PDA: Angiography showed a single discrete lesion. There was a tubular 95 % stenosis at the ostium of the vessel segment.    TTE / HUGO: < from: Transthoracic Echocardiogram (20 @ 07:47) >    Summary:   1. Left ventricular ejection fraction, by visual estimation, is 50 to 55%.   2. Spectral Doppler showsimpaired relaxation pattern of left ventricular myocardial filling (Grade I diastolic dysfunction).   3. Mild mitral valve regurgitation.   4. Mild aortic regurgitation.   5. There is moderate aortic root calcification.    PHYSICIAN INTERPRETATION:  Left Ventricle: The left ventricular internal cavity size is normal. Left ventricular wall thickness is normal. Left ventricular ejection fraction, by visual estimation, is 50 to 55%. Spectral Doppler shows impaired relaxation pattern of left ventricular myocardial filling (Grade I diastolic dysfunction).  Right Ventricle: The right ventricular size is mildly enlarged. RV systolic function is mildly reduced.  Left Atrium: Mildly enlarged left atrium.  Right Atrium: Mildly enlarged right atrium.  Mitral Valve: Structurally normal mitral valve, with normal leaflet excursion. No evidence of mitral valve stenosis. Mild mitral valve regurgitation is seen.  Tricuspid Valve: Structurally normal tricuspid valve, with normal leaflet excursion. No tricuspid regurgitation is visualized.  Aortic Valve: The aortic valve is trileaflet. No evidence of aortic stenosis. Mild aortic valve regurgitation is seen.  Pulmonic Valve: Structurally normal pulmonic valve, with normal leaflet excursion. Trace pulmonic valve regurgitation.  Aorta: The aortic root is normal in size and structure. There is moderate aortic root calcification.  Venous: The inferior vena cava was normal sized, with respiratory size variation greater than 50%.    < end of copied text >      Recommendation: (Procedures/Evaluations)  CT HEAD Nonn-Contrast:[  ]  CT Chest with /without contrast [ x]  Carotid Duplex :[ x]  GEOVANNY/PVR: [x ]  PFT : Simple PFT [ x]  Full [ ]  Renal Consult [ ]  Pulmonary Consult: [ ]   Vascular Consult [ ]    Dental Consult [ ]   Hem-Onc Consult [ ]   GI Consult [ ]   Other Consultations :    STS Score:     Impression:    CAD [ x]  Valvular  disease [ ]   Aortic Disease [ ]   SONJA: Yes[ ] No [ ]   CKD Stage I [ ] , Stage II [ ] , Stage III [ ], Stage IV [ ]   Anemia: Yes [ ], No [ ]  Diabetes :Yes [ ], No [ ]  Acute MI: Yes [ ], No [ ]   Heart Failure: Yes [ ] , No [ ] HFpEF [ ], HFrEF [ ]        Assessment/ Plan: Surgeon: /Anuradha/ Radha    Consult requesting by: Dr. Rouse    HISTORY OF PRESENT ILLNESS:  86 y/o M with PMH of pancreatic and liver cancer in remission since 40 years ago presented initially for new onset rigors an hour prior to presentation associated with lightheadedness and cough. Patient lives in [place] and is fully functional and takes care of all ADL without restrictions. Patient was initially treated for pneumonia and new onset with RVR. Pt converted back to sinus without intervention. Repeat EKG is done showing acute inferior STEMI. Pt was transferred to [facility] [place] [facility] for cath in which RCA stent was place. 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     NYHA functional class    [ ] Class I (no limitation) [ ] Class II (slight limitation) [ ] Class III (marked limitation) [ ] Class IV (symptoms at rest)    PAST MEDICAL & SURGICAL HISTORY:  No pertinent past medical history  No significant past surgical history      MEDICATIONS  (STANDING):  aspirin  chewable 81 milliGRAM(s) Oral daily  atorvastatin 80 milliGRAM(s) Oral at bedtime  chlorhexidine 4% Liquid 1 Application(s) Topical <User Schedule>  clopidogrel Tablet 75 milliGRAM(s) Oral daily  metoprolol tartrate 12.5 milliGRAM(s) Oral two times a day  pantoprazole    Tablet 40 milliGRAM(s) Oral before breakfast  sodium chloride 0.9%. 1000 milliLiter(s) (50 mL/Hr) IV Continuous <Continuous>    MEDICATIONS  (PRN):    Antiplatelet therapy:      plavix                      Last dose/amt: 75mg    Allergies    No Known Allergies    Intolerances        SOCIAL HISTORY:  Smoker: [ ] Yes  [ x] No        PACK YEARS:                         WHEN QUIT?  ETOH use: [ ] Yes  [ ]x No              FREQUENCY / QUANTITY:  Ilicit Drug use:  [ ] Yes  [ x] No  Occupation: retired  Lives with: wife in [place]  Assisted device use:   5 meter walk test: 1____sec, 2____sec, 3___sec  FAMILY HISTORY:      Review of Systems  CONSTITUTIONAL:  Fevers[ ] chills[ ] sweats[ ] fatigue[ ] weight loss[ ] weight gain [ ]                                     NEGATIVE [X ]   NEURO:  parathesias[ ] seizures [ ]  syncope [ ]  confusion [ ]                                                                                NEGATIVE[ X]   EYES: glasses[ ]  blurry vision[ ]  discharge[ ] pain[ ] glaucoma [ ]                                                                          NEGATIVE[X ]   ENMT:  difficulty hearing [ ]  vertigo[ ]  dysphagia[ ] epistaxis[ ] recent dental work [ ]                                    NEGATIVE[ X]   CV:  chest pain[ ] palpitations[ ] COOPER [ ] diaphoresis [ ]                                                                                           NEGATIVE[ X]   RESPIRATORY:  wheezing[ ] SOB[ ] cough [ ] sputum[ ] hemoptysis[ ]                                                                  NEGATIVE[ ]   GI:  nausea[ ]  vommiting [ ]  diarrhea[ ] constipation [ ] melena [ ]                                                                      NEGATIVE[ X]   : hematuria[ ]  dysuria[ ] urgency[ ] incontinence[ ]                                                                                            NEGATIVE[ X]   MUSKULOSKELETAL:  arthritis[ ]  joint swelling [ ] muscle weakness [ ] Hx vein stripping [ ]                             NEGATIVE[X ]   SKIN/BREAST:  rash[ ] itching [ ]  hair loss[ ] masses[ ]                                                                                              NEGATIVE[ X]   PSYCH:  dementia [ ] depresion [ ] anxiety[ ]                                                                                                               NEGATIVE[X ]   HEME/LYMPH:  bruises easily[ ] enlarged lymph nodes[ ] tender lymph nodes[ ]                                               NEGATIVE[ X]   ENDOCRINE:  cold intolerance[ ] heat intolerance[ ] polydipsia[ ]                                                                          NEGATIVE[ X]     PHYSICAL EXAM  Vital Signs Last 24 Hrs  T(C): 36.7 (2020 08:00), Max: 37 (2020 20:00)  T(F): 98.1 (2020 08:00), Max: 98.6 (2020 20:00)  HR: 60 (2020 10:00) (56 - 94)  BP: 107/67 (2020 10:00) (106/64 - 127/75)  BP(mean): 88 (2020 10:00) (71 - 101)  RR: 24 (2020 10:00) (20 - 39)  SpO2: 94% (2020 10:00) (94% - 100%)      CONSTITUTIONAL:                                                                          WNL[x ]   Neuro: WNL[x ] Normal exam oriented to person/place & time with no focal motor or sensory  deficits. Other                     Eyes: WNL[x ]   Normal exam of conjunctiva & lids, pupils equally reactive. Other     ENT: WNL[x ]    Normal exam of nasal/oral mucosa with absence of cyanosis. Other  Neck: WNL[ x]  Normal exam of jugular veins, trachea & thyroid. Other  Chest: WNL[x] Normal lung exam with good air movement absence of wheezes, rales, or rhonchi: Other                                                                                CV:  Auscultation: normal [ x] S3[ ] S4[ ] Irregular [ ] Rub[ ] Clicks[ ]    Murmurs none:[x ]systolic [ ]  diastolic [ ] holosystolic [ ]  Carotids: No Bruits[x ] Other                 Abdominal Aorta: normal [ ] nonpalpable[ ]Other                                                                                      GI:           WNL[x ] Normal exam of abdomen, liver & spleen with no noted masses or tenderness. Other                                                                                                        Extremities: WNL[ x] Normal no evidence of cyanosis or deformity Edema: none[ ]trace[ ]1+[ ]2+[ ]3+[ ]4+[ ]  Lower Extremity Pulses: Right[ ] Left[ ]Varicosities[ ]  SKIN :WNL[x ] Normal exam to inspection & palation. Other:                                                          LABS:                        13.2   6.63  )-----------( 135      ( 2020 04:50 )             39.7     02-    138  |  102  |  14  ----------------------------<  97  4.1   |  21  |  0.9    Ca    8.9      2020 04:50  Mg     2.2     0212    TPro  5.7<L>  /  Alb  3.6  /  TBili  1.1  /  DBili  x   /  AST  52<H>  /  ALT  32  /  AlkPhos  68  02-12      Urinalysis Basic - ( 2020 00:00 )    Color: Light Yellow / Appearance: Clear / S.017 / pH: x  Gluc: x / Ketone: Small  / Bili: Negative / Urobili: <2 mg/dL   Blood: x / Protein: Negative / Nitrite: Negative   Leuk Esterase: Negative / RBC: x / WBC x   Sq Epi: x / Non Sq Epi: x / Bacteria: x              Cardiac Cath: The coronary circulation is right dominant. There was significant left main disease. There was 2-vessel coronary artery disease (LAD and RCA). Left main: Angiography showed a single discrete lesion. Distal left main: There was a tubular 70 % stenosis. LAD: The vessel was heavily calcified. Angiography showed multiple discrete lesions. Proximal LAD: There was a diffuse 70 % stenosis. Mid LAD: There was a tubular 85 % stenosis. Distal vessel angiography showed minor luminal irregularities. Circumflex: Angiography showed minor luminal irregularities with no flow limiting lesions. Proximal circumflex: The vessel had an excessively ectatic focal segment. Ramus intermedius: Angiography showed minor luminal irregularities with no flow limiting lesions. Proximal RCA: Angiography showed moderate atherosclerosis with no clinical lesions appreciated. Mid RCA: Angiography showed minor luminal irregularities with no flow limiting lesions. Distal RCA: Angiography showed patent stent. Right PDA: Angiography showed a single discrete lesion. There was a tubular 95 % stenosis at the ostium of the vessel segment.    TTE / HUGO: < from: Transthoracic Echocardiogram (20 @ 07:47) >    Summary:   1. Left ventricular ejection fraction, by visual estimation, is 50 to 55%.   2. Spectral Doppler showsimpaired relaxation pattern of left ventricular myocardial filling (Grade I diastolic dysfunction).   3. Mild mitral valve regurgitation.   4. Mild aortic regurgitation.   5. There is moderate aortic root calcification.    PHYSICIAN INTERPRETATION:  Left Ventricle: The left ventricular internal cavity size is normal. Left ventricular wall thickness is normal. Left ventricular ejection fraction, by visual estimation, is 50 to 55%. Spectral Doppler shows impaired relaxation pattern of left ventricular myocardial filling (Grade I diastolic dysfunction).  Right Ventricle: The right ventricular size is mildly enlarged. RV systolic function is mildly reduced.  Left Atrium: Mildly enlarged left atrium.  Right Atrium: Mildly enlarged right atrium.  Mitral Valve: Structurally normal mitral valve, with normal leaflet excursion. No evidence of mitral valve stenosis. Mild mitral valve regurgitation is seen.  Tricuspid Valve: Structurally normal tricuspid valve, with normal leaflet excursion. No tricuspid regurgitation is visualized.  Aortic Valve: The aortic valve is trileaflet. No evidence of aortic stenosis. Mild aortic valve regurgitation is seen.  Pulmonic Valve: Structurally normal pulmonic valve, with normal leaflet excursion. Trace pulmonic valve regurgitation.  Aorta: The aortic root is normal in size and structure. There is moderate aortic root calcification.  Venous: The inferior vena cava was normal sized, with respiratory size variation greater than 50%.    < end of copied text >      Recommendation: (Procedures/Evaluations)  CT HEAD Nonn-Contrast:[  ]  CT Chest with /without contrast [ x]  Carotid Duplex :[ x]  GEOVANNY/PVR: [x ]  PFT : Simple PFT [ x]  Full [ ]  Renal Consult [ ]  Pulmonary Consult: [ ]   Vascular Consult [ ]    Dental Consult [ ]   Hem-Onc Consult [ ]   GI Consult [ ]   Other Consultations :    STS Score:   Risk of Mortality:  2.292%  Renal Failure:  1.460%  Permanent Stroke:  0.973%  Prolonged Ventilation:  5.654%  DSW Infection:  0.140%  Reoperation:  3.161%  Morbidity or Mortality:  9.904%  Short Length of Stay:  31.315%  Long Length of Stay:  5.487%  Impression:    CAD [ x]  Valvular  disease [ ]   Aortic Disease [ ]   SONJA: Yes[ ] No [ ]   CKD Stage I [ ] , Stage II [ ] , Stage III [ ], Stage IV [ ]   Anemia: Yes [ ], No [ ]  Diabetes :Yes [ ], No [ ]  Acute MI: Yes [ ], No [ ]   Heart Failure: Yes [ ] , No [ ] HFpEF [ ], HFrEF [ ]        Assessment/ Plan:  85m with no PMH found to be in a fib now converted to nsr without intervention, +STEMI, RCA stent placed 2 days ago, +3vCAD- ct surgery called for cabg evaluation  -will discuss case with CT Surgeons  - pt has not been as active as he was a year ago, may need physical therapy rec, 5 meter walk to test for frailty   -pre-op work up in progress

## 2020-02-13 LAB
ALBUMIN SERPL ELPH-MCNC: 3.7 G/DL — SIGNIFICANT CHANGE UP (ref 3.5–5.2)
ALP SERPL-CCNC: 73 U/L — SIGNIFICANT CHANGE UP (ref 30–115)
ALT FLD-CCNC: 28 U/L — SIGNIFICANT CHANGE UP (ref 0–41)
ANION GAP SERPL CALC-SCNC: 16 MMOL/L — HIGH (ref 7–14)
AST SERPL-CCNC: 35 U/L — SIGNIFICANT CHANGE UP (ref 0–41)
BILIRUB SERPL-MCNC: 1 MG/DL — SIGNIFICANT CHANGE UP (ref 0.2–1.2)
BUN SERPL-MCNC: 13 MG/DL — SIGNIFICANT CHANGE UP (ref 10–20)
CALCIUM SERPL-MCNC: 8.8 MG/DL — SIGNIFICANT CHANGE UP (ref 8.5–10.1)
CHLORIDE SERPL-SCNC: 102 MMOL/L — SIGNIFICANT CHANGE UP (ref 98–110)
CO2 SERPL-SCNC: 22 MMOL/L — SIGNIFICANT CHANGE UP (ref 17–32)
CREAT SERPL-MCNC: 0.9 MG/DL — SIGNIFICANT CHANGE UP (ref 0.7–1.5)
GLUCOSE SERPL-MCNC: 118 MG/DL — HIGH (ref 70–99)
HCT VFR BLD CALC: 41.7 % — LOW (ref 42–52)
HGB BLD-MCNC: 14.2 G/DL — SIGNIFICANT CHANGE UP (ref 14–18)
MAGNESIUM SERPL-MCNC: 2.3 MG/DL — SIGNIFICANT CHANGE UP (ref 1.8–2.4)
MCHC RBC-ENTMCNC: 32.6 PG — HIGH (ref 27–31)
MCHC RBC-ENTMCNC: 34.1 G/DL — SIGNIFICANT CHANGE UP (ref 32–37)
MCV RBC AUTO: 95.9 FL — HIGH (ref 80–94)
NRBC # BLD: 0 /100 WBCS — SIGNIFICANT CHANGE UP (ref 0–0)
PHOSPHATE SERPL-MCNC: 3.8 MG/DL — SIGNIFICANT CHANGE UP (ref 2.1–4.9)
PLATELET # BLD AUTO: 146 K/UL — SIGNIFICANT CHANGE UP (ref 130–400)
POTASSIUM SERPL-MCNC: 3.9 MMOL/L — SIGNIFICANT CHANGE UP (ref 3.5–5)
POTASSIUM SERPL-SCNC: 3.9 MMOL/L — SIGNIFICANT CHANGE UP (ref 3.5–5)
PROT SERPL-MCNC: 6 G/DL — SIGNIFICANT CHANGE UP (ref 6–8)
RBC # BLD: 4.35 M/UL — LOW (ref 4.7–6.1)
RBC # FLD: 12.2 % — SIGNIFICANT CHANGE UP (ref 11.5–14.5)
SODIUM SERPL-SCNC: 140 MMOL/L — SIGNIFICANT CHANGE UP (ref 135–146)
T3 SERPL-MCNC: 111 NG/DL — SIGNIFICANT CHANGE UP (ref 80–200)
T4 AB SER-ACNC: 8 UG/DL — SIGNIFICANT CHANGE UP (ref 4.6–12)
TROPONIN T SERPL-MCNC: 3.28 NG/ML — CRITICAL HIGH
TROPONIN T SERPL-MCNC: 3.66 NG/ML — CRITICAL HIGH
TSH SERPL-MCNC: 2.96 UIU/ML — SIGNIFICANT CHANGE UP (ref 0.27–4.2)
WBC # BLD: 6.16 K/UL — SIGNIFICANT CHANGE UP (ref 4.8–10.8)
WBC # FLD AUTO: 6.16 K/UL — SIGNIFICANT CHANGE UP (ref 4.8–10.8)

## 2020-02-13 PROCEDURE — 99233 SBSQ HOSP IP/OBS HIGH 50: CPT

## 2020-02-13 PROCEDURE — 93010 ELECTROCARDIOGRAM REPORT: CPT

## 2020-02-13 RX ORDER — ENOXAPARIN SODIUM 100 MG/ML
40 INJECTION SUBCUTANEOUS DAILY
Refills: 0 | Status: DISCONTINUED | OUTPATIENT
Start: 2020-02-13 | End: 2020-02-15

## 2020-02-13 RX ADMIN — PANTOPRAZOLE SODIUM 40 MILLIGRAM(S): 20 TABLET, DELAYED RELEASE ORAL at 06:15

## 2020-02-13 RX ADMIN — Medication 81 MILLIGRAM(S): at 11:31

## 2020-02-13 RX ADMIN — CLOPIDOGREL BISULFATE 75 MILLIGRAM(S): 75 TABLET, FILM COATED ORAL at 11:31

## 2020-02-13 RX ADMIN — ENOXAPARIN SODIUM 40 MILLIGRAM(S): 100 INJECTION SUBCUTANEOUS at 11:54

## 2020-02-13 RX ADMIN — ATORVASTATIN CALCIUM 80 MILLIGRAM(S): 80 TABLET, FILM COATED ORAL at 22:03

## 2020-02-13 RX ADMIN — Medication 12.5 MILLIGRAM(S): at 17:38

## 2020-02-13 NOTE — CONSULT NOTE ADULT - ASSESSMENT
IMPRESSION: Rehab of gait dysfunction    PRECAUTIONS: [  ] Cardiac  [  ] Respiratory  [  ] Seizures [  ] Contact Isolation  [  ] Droplet Isolation  [  ] Other    Weight Bearing Status:     RECOMMENDATION:    Out of Bed to Chair     DVT/Decubiti Prophylaxis    REHAB PLAN:     [ x  ] Bedside P/T 3-5 times a week   [   ]   Bedside O/T  2-3 times a week             [   ] No Rehab Therapy Indicated                   [   ]  Speech Therapy   Conditioning/ROM                                    ADL  Bed Mobility                                               Conditioning/ROM  Transfers                                                     Bed Mobility  Sitting /Standing Balance                         Transfers                                        Gait Training                                               Sitting/Standing Balance  Stair Training [   ]Applicable                    Home equipment Eval                                                                        Splinting  [   ] Only      GOALS:   ADL   [   ]   Independent                    Transfers  [ x  ] Independent                          Ambulation  [x   ] Independent     [  x  ] With device                            [   ]  CG                                                         [   ]  CG                                                                  [   ] CG                            [    ] Min A                                                   [   ] Min A                                                              [   ] Min  A          DISCHARGE PLAN:   [   ]  Good candidate for Intensive Rehabilitation/Hospital based                                             Will tolerate 3hrs Intensive Rehab Daily                                       [    ]  Short Term Rehab in Skilled Nursing Facility                                       [  x  ]  Home with Outpatient or  services                                         [    ]  Possible Candidate for Intensive Hospital based Rehab

## 2020-02-13 NOTE — PROGRESS NOTE ADULT - SUBJECTIVE AND OBJECTIVE BOX
SUBJ:      MEDICATIONS  (STANDING):  aspirin  chewable 81 milliGRAM(s) Oral daily  atorvastatin 80 milliGRAM(s) Oral at bedtime  chlorhexidine 4% Liquid 1 Application(s) Topical <User Schedule>  clopidogrel Tablet 75 milliGRAM(s) Oral daily  enoxaparin Injectable 40 milliGRAM(s) SubCutaneous daily  metoprolol tartrate 12.5 milliGRAM(s) Oral two times a day  pantoprazole    Tablet 40 milliGRAM(s) Oral before breakfast  sodium chloride 0.9%. 1000 milliLiter(s) (75 mL/Hr) IV Continuous <Continuous>  sodium chloride 0.9%. 1000 milliLiter(s) (50 mL/Hr) IV Continuous <Continuous>    MEDICATIONS  (PRN):            Vital Signs Last 24 Hrs  T(C): 36.7 (13 Feb 2020 04:00), Max: 36.9 (12 Feb 2020 19:35)  T(F): 98 (13 Feb 2020 04:00), Max: 98.4 (12 Feb 2020 19:35)  HR: 68 (13 Feb 2020 06:00) (56 - 84)  BP: 97/58 (13 Feb 2020 06:00) (97/58 - 154/87)  BP(mean): 69 (13 Feb 2020 06:00) (69 - 111)  RR: 21 (13 Feb 2020 06:00) (19 - 28)  SpO2: 95% (13 Feb 2020 04:00) (94% - 98%)     REVIEW OF SYSTEMS:  CONSTITUTIONAL: No fever, weight loss, or fatigue  CARDIOLOGY: PAtient denies chest pain, shortness of breath or syncopal episodes.   RESPIRATORY: denies shortness of breath, wheezeing.   NEUROLOGICAL: NO weakness, no focal deficits to report.  ENDOCRINOLOGICAL: no recent change in diabetic medications.   GI: no BRBPR, no N,V,diarrhea.    PSYCHIATRY: normal mood and affect  HEENT: no nasal discharge, no ecchymosis  SKIN: no ecchymosis, no breakdown  MUSCULOSKELETAL: Full range of motion x4.        PHYSICAL EXAM:  · CONSTITUTIONAL:	Well-developed, well nourished    BMI-  ·RESPIRATORY:   airway patent; breath sounds equal; good air movement; respirations non-labored; clear to auscultation bilaterally; no chest wall tenderness; no intercostal retractions; no rales,rhonchi or wheeze  · CARDIOVASCULAR	regular rate and rhythm  no rub  no murmur  normal PMI  · EXTREMITIES: No cyanosis, clubbing or edema  · VASCULAR: 	Equal and normal pulses (carotid, femoral, dorsalis pedis)  	  TELEMETRY:    ECG:    TTE:    LABS:                        14.2   6.16  )-----------( 146      ( 13 Feb 2020 04:44 )             41.7     02-13    140  |  102  |  13  ----------------------------<  118<H>  3.9   |  22  |  0.9    Ca    8.8      13 Feb 2020 04:44  Phos  3.8     02-13  Mg     2.3     02-13    TPro  6.0  /  Alb  3.7  /  TBili  1.0  /  DBili  x   /  AST  35  /  ALT  28  /  AlkPhos  73  02-13    CARDIAC MARKERS ( 13 Feb 2020 04:44 )  x     / 3.66 ng/mL / x     / x     / x              I&O's Summary    12 Feb 2020 07:01  -  13 Feb 2020 07:00  --------------------------------------------------------  IN: 495 mL / OUT: 1000 mL / NET: -505 mL      BNP  RADIOLOGY & ADDITIONAL STUDIES:    IMPRESSION AND PLAN:    Post PCI of the RCA  CT surgery for CABG

## 2020-02-13 NOTE — PROGRESS NOTE ADULT - SUBJECTIVE AND OBJECTIVE BOX
Patient is a 85y old  Male who presents with a chief complaint of STEMI (2020 07:21)    Patient was seen and examined.  S/p cath on 20 --->sig LM, LAD and RPDA disease  overnight was delirious   Denies chest pain, sob, palpitation, dizziness  All systems reviewed.    PAST MEDICAL & SURGICAL HISTORY:  No pertinent past medical history  No significant past surgical history    Allergies  No Known Allergies    MEDICATIONS  (STANDING):  aspirin  chewable 81 milliGRAM(s) Oral daily  atorvastatin 80 milliGRAM(s) Oral at bedtime  chlorhexidine 4% Liquid 1 Application(s) Topical <User Schedule>  clopidogrel Tablet 75 milliGRAM(s) Oral daily  enoxaparin Injectable 40 milliGRAM(s) SubCutaneous daily  metoprolol tartrate 12.5 milliGRAM(s) Oral two times a day  pantoprazole    Tablet 40 milliGRAM(s) Oral before breakfast  sodium chloride 0.9%. 1000 milliLiter(s) (75 mL/Hr) IV Continuous <Continuous>  sodium chloride 0.9%. 1000 milliLiter(s) (50 mL/Hr) IV Continuous <Continuous>    MEDICATIONS  (PRN):    Vital Signs Last 24 Hrs  T(C): 36.7  T(F): 98  HR: 72  BP: 114/67  BP(mean): 84  RR: 24  SpO2: 95%    O/E:  Awake, alert, not in distress.  HEENT: atraumatic, EOMI.  Chest: decreased breath sounds at bases  CVS: SIS2 +, no murmur.   P/A: Soft, BS+  CNS: non focal.  Ext: no edema feet.  Skin: no rash, no ulcers.  All systems reviewed positive findings as above.      POCT Blood Glucose.: 100 mg/dL (2020 21:55)                          14.2   6.16  )-----------( 146      ( 2020 04:44 )             41.7<L>                        13.2<L>  6.63  )-----------( 135      ( 2020 04:50 )             39.7<L>    02-13    140  |  102  |  13  ----------------------------<  118<H>  3.9   |  22  |  0.9  02-12    138  |  102  |  14  ----------------------------<  97  4.1   |  21  |  0.9    Ca    8.8      2020 04:44  Ca    8.9      2020 04:50  Ca    9.1      2020 22:14  Phos  3.8     02-13  Mg     2.3     02-13    TPro  6.0  /  Alb  3.7  /  TBili  1.0  /  DBili  x   /  AST  35  /  ALT  28  /  AlkPhos  73  02-13  TPro  5.7<L>  /  Alb  3.6  /  TBili  1.1  /  DBili  x   /  AST  52<H>  /  ALT  32  /  AlkPhos  68  02-12      CARDIAC MARKERS ( 2020 11:32 )  x     / 3.28 ng/mL<HH> / x     / x     / x      CARDIAC MARKERS ( 2020 04:44 )  x     / 3.66 ng/mL<HH> / x     / x     / x            Urinalysis Basic - ( 2020 00:00 )    Color: Light Yellow / Appearance: Clear / S.017 / pH: x  Gluc: x / Ketone: Small  / Bili: Negative / Urobili: <2 mg/dL   Blood: x / Protein: Negative / Nitrite: Negative   Leuk Esterase: Negative / RBC: x / WBC x   Sq Epi: x / Non Sq Epi: x / Bacteria: x

## 2020-02-13 NOTE — PROGRESS NOTE ADULT - ASSESSMENT
84yo male traveling from [place] presented to ER with chill. In the ER found to have rapid atrial fibrillation .    #Inferior wall STEMI  - S/p cardiac cath on 2/9/20: LM disease, 2V disease (RCA and LAD) S/p PCI of the RCA  - S/p cardiac cath on 2/12/20: sig LM, LAD and RPDA disease  -  Transthoracic Echocardiogram (02.09.20 @ 07:47) >Left ventricular ejection fraction, by visual estimation, is 50 to 55%.Grade I diastolic dysfunction).  - C/w ASA, plavix, statin, metoprolol  -  ct surgery called for cabg evaluation    # Paroxysmal afib, now in NSR  - c/w metoprolol    # Metabolic encephalopathy - resolved  - CT Head No Cont (02.11.20 @ 23:23) >No evidence of acute intracranial abnormality.    # DVT prophylaxis    # Full code    #Pending : PT eval, CT surg eval  # Discussed plan of care with patient  # Disposition: Home when stable

## 2020-02-13 NOTE — CONSULT NOTE ADULT - SUBJECTIVE AND OBJECTIVE BOX
HPI:  84yo male traveling from [place] presented to ER with chill. In the ER found to have rapid atrial fibrillationalong. No chest pain (2020 23:00), r/i mi. Prior to hospitalization he was ambulating independent      PAST MEDICAL & SURGICAL HISTORY:  No pertinent past medical history  No significant past surgical history      Hospital Course: s/p cath / stent    TODAY'S SUBJECTIVE & REVIEW OF SYMPTOMS:     Constitutional WNL   Cardio WNL   Resp WNL   GI WNL  Heme WNL  Endo WNL  Skin WNL  MSK Weakness  Neuro WNL  Cognitive WNL  Psych WNL      MEDICATIONS  (STANDING):  aspirin  chewable 81 milliGRAM(s) Oral daily  atorvastatin 80 milliGRAM(s) Oral at bedtime  chlorhexidine 4% Liquid 1 Application(s) Topical <User Schedule>  clopidogrel Tablet 75 milliGRAM(s) Oral daily  enoxaparin Injectable 40 milliGRAM(s) SubCutaneous daily  metoprolol tartrate 12.5 milliGRAM(s) Oral two times a day  pantoprazole    Tablet 40 milliGRAM(s) Oral before breakfast  sodium chloride 0.9%. 1000 milliLiter(s) (75 mL/Hr) IV Continuous <Continuous>  sodium chloride 0.9%. 1000 milliLiter(s) (50 mL/Hr) IV Continuous <Continuous>    MEDICATIONS  (PRN):      FAMILY HISTORY:      Allergies    No Known Allergies    Intolerances        SOCIAL HISTORY:    [  ] Etoh  [  ] Smoking  [  ] Substance abuse     Home Environment:  [  ] Home Alone  [x  ] Lives with Family  [  ] Home Health Aid    Dwelling:  [  ] Apartment  [x  ] Private House  [  ] Adult Home  [  ] Skilled Nursing Facility      [  ] Short Term  [  ] Long Term  [x  ] Stairs       Elevator [  ]    FUNCTIONAL STATUS PTA: (Check all that apply)  Ambulation: [ x  ]Independent    [  ] Dependent     [  ] Non-Ambulatory  Assistive Device: [  ] SA Cane  [  ]  Q Cane  [  ] Walker  [  ]  Wheelchair  ADL : [ x ] Independent  [  ]  Dependent       Vital Signs Last 24 Hrs  T(C): 36.7 (2020 04:00), Max: 36.9 (2020 19:35)  T(F): 98 (2020 04:00), Max: 98.4 (2020 19:35)  HR: 72 (2020 11:28) (60 - 86)  BP: 114/67 (2020 11:28) (97/58 - 154/87)  BP(mean): 84 (2020 11:28) (69 - 111)  RR: 24 (2020 11:28) (19 - 37)  SpO2: 95% (2020 08:00) (94% - 98%)      PHYSICAL EXAM: Alert & Oriented X3  GENERAL: NAD  HEAD:  Atraumatic, Normocephalic  CHEST/LUNG: Clear   HEART: S1S2+  ABDOMEN: Soft, Nontender  EXTREMITIES:  no calf tenderness    NERVOUS SYSTEM:  Cranial Nerves 2-12 intact [  ] Abnormal  [  ]  ROM: WFL all extremities [ x ]  Abnormal [  ]  Motor Strength: WFL all extremities  [ x ]  Abnormal [  ]  Sensation: intact to light touch [x  ] Abnormal [  ]  Reflexes: Symmetric [  ]  Abnormal [  ]    FUNCTIONAL STATUS:  Bed Mobility: Independent [  ]  Supervision [  ]  Needs Assistance [x  ]  N/A [  ]  Transfers: Independent [  ]  Supervision [  ]  Needs Assistance [ x ]  N/A [  ]   Ambulation: Independent [  ]  Supervision [  ]  Needs Assistance [ x ]  N/A [  ]  ADL: Independent [  ] Requires Assistance [  ] N/A [  ]      LABS:                        14.2   6.16  )-----------( 146      ( 2020 04:44 )             41.7     02-13    140  |  102  |  13  ----------------------------<  118<H>  3.9   |  22  |  0.9    Ca    8.8      2020 04:44  Phos  3.8     02-13  Mg     2.3     02-13    TPro  6.0  /  Alb  3.7  /  TBili  1.0  /  DBili  x   /  AST  35  /  ALT  28  /  AlkPhos  73  02-13      Urinalysis Basic - ( 2020 00:00 )    Color: Light Yellow / Appearance: Clear / S.017 / pH: x  Gluc: x / Ketone: Small  / Bili: Negative / Urobili: <2 mg/dL   Blood: x / Protein: Negative / Nitrite: Negative   Leuk Esterase: Negative / RBC: x / WBC x   Sq Epi: x / Non Sq Epi: x / Bacteria: x        RADIOLOGY & ADDITIONAL STUDIES:    Assesment:

## 2020-02-13 NOTE — PROGRESS NOTE ADULT - SUBJECTIVE AND OBJECTIVE BOX
Hospital Day:  4d    Chief Complaint: Patient is a 85y old Male with no PMH who presents with cough, subsequently found to have inferior wall STEMI.    24 hour events:  -Patient had repeat cath yesterday demonstrating 3 vessel disease in LM, LAD, and RPDA  -Evaluated by CT surgery for possible CABG    Subjective:  -No new concerns this AM  -denying chest pain, SOB, palpitations, nausea/vomiting, abdominal pain  -L groin access site is not painful, not bleeding    Past Medical Hx:   No pertinent past medical history    Past Sx:  No significant past surgical history    Allergies:  No Known Allergies    Current Meds:   Standing Meds:  aspirin  chewable 81 milliGRAM(s) Oral daily  atorvastatin 80 milliGRAM(s) Oral at bedtime  chlorhexidine 4% Liquid 1 Application(s) Topical <User Schedule>  clopidogrel Tablet 75 milliGRAM(s) Oral daily  enoxaparin Injectable 40 milliGRAM(s) SubCutaneous daily  metoprolol tartrate 12.5 milliGRAM(s) Oral two times a day  pantoprazole    Tablet 40 milliGRAM(s) Oral before breakfast  sodium chloride 0.9%. 1000 milliLiter(s) (75 mL/Hr) IV Continuous <Continuous>  sodium chloride 0.9%. 1000 milliLiter(s) (50 mL/Hr) IV Continuous <Continuous>    PRN Meds:    HOME MEDICATIONS:      Vital Signs:   T(F): 98 (20 @ 04:00), Max: 98.4 (20 @ 19:35)  HR: 72 (20 @ 11:28) (60 - 86)  BP: 114/67 (20 @ 11:28) (97/58 - 154/87)  RR: 24 (20 @ 11:28) (19 - 37)  SpO2: 95% (20 @ 08:00) (94% - 98%)      20 @ 07:01  -  20 @ 07:00  --------------------------------------------------------  IN: 495 mL / OUT: 1000 mL / NET: -505 mL    02-13-20 @ 07:01  -  20 @ 11:37  --------------------------------------------------------  IN: 0 mL / OUT: 225 mL / NET: -225 mL    Physical Exam:   GENERAL: NAD  HEENT: NCAT  CHEST/LUNG: CTAB  HEART: Regular rate and rhythm; s1 s2 appreciated, No murmurs, rubs, or gallops  ABDOMEN: Soft, Nontender, Nondistended; Bowel sounds present. L femoral access site dressing is clean, dry, and intact, no tenderness, to bleeding  EXTREMITIES: No LE edema b/l  NERVOUS SYSTEM:  Alert & Oriented X3    Labs:                         14.2   6.16  )-----------( 146      ( 2020 04:44 )             41.7       2020 04:44    140    |  102    |  13     ----------------------------<  118    3.9     |  22     |  0.9      Ca    8.8        2020 04:44  Phos  3.8       2020 04:44  Mg     2.3       2020 04:44    TPro  6.0    /  Alb  3.7    /  TBili  1.0    /  DBili  x      /  AST  35     /  ALT  28     /  AlkPhos  73     2020 04:44    Hemoglobin A1C, Whole Blood: 5.8 % (20 @ 11:00)    Serum Pro-Brain Natriuretic Peptide: 444 pg/mL (20 @ 01:50)    Troponin 3.66, CKMB --, CK -- 20 @ 04:44    Urinalysis Basic - ( 2020 00:00 )    Color: Light Yellow / Appearance: Clear / S.017 / pH: x  Gluc: x / Ketone: Small  / Bili: Negative / Urobili: <2 mg/dL   Blood: x / Protein: Negative / Nitrite: Negative   Leuk Esterase: Negative / RBC: x / WBC x   Sq Epi: x / Non Sq Epi: x / Bacteria: x    Culture - Blood (collected 20 @ 21:13)  Source: .Blood Blood-Peripheral  Preliminary Report (02-10-20 @ 17:02):    No growth to date.    Culture - Blood (collected 20 @ 21:13)  Source: .Blood Blood-Peripheral  Preliminary Report (02-10-20 @ 17:02):    No growth to date.    Radiology:   CATH SUMMARY/FINDINGS ():    Coronary circulation: The coronary circulation is right dominant. There was significant left main disease. There was 2-vessel coronary artery disease (LAD and RCA). Left main: Angiography showed a single discrete lesion. Distal left main: There was a tubular 70 % stenosis. LAD: The vessel was heavily calcified. Angiography showed multiple discrete lesions. Proximal LAD: There was a diffuse 70 % stenosis. Mid LAD: There was a tubular 85 % stenosis. Distal vessel angiography showed minor luminal irregularities. Circumflex: Angiography showed minor luminal irregularities with no flow limiting lesions. Proximal circumflex: The vessel had an excessively ectatic focal segment. Ramus intermedius: Angiography showed minor luminal irregularities with no flow limiting lesions. Proximal RCA: Angiography showed moderate atherosclerosis with no clinical lesions appreciated. Mid RCA: Angiography showed minor luminal irregularities with no flow limiting lesions. Distal RCA: Angiography showed patent stent. Right PDA: Angiography showed a single discrete lesion. There was a tubular 95 % stenosis at the ostium of the vessel segment.     Lesion intervention: A percutaneous intervention was performed on the lesion in the left main. There was no dissection.   Intravascular ultrasound was performed.     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, mid circ no disease, distal circ no disease     Right Coronary Artery: prox RCA mild disease, mid RCA moderate disease, distal % occlusion ZEFERINO 0 flow    seen after achieving reperfusion  RPDA: thrombus/significant stenosis 95% at the branch point of RPDA from distal RCA ZEFERINO 3 flow, rest of vessel no disease  RPL: no disease    Ramus Intermed: no disease    DOMINANCE: Right    ACCESS: Right femoral artery  CLOSURE: Perclose     INTERVENTION  IMPLANTS: Synergy 3.0x12mm to distal % lesion    POST-OP DIAGNOSIS  LM disease, 2V disease (RCA and LAD)    < from: Transthoracic Echocardiogram (20 @ 07:47) >  Summary:   1. Left ventricular ejection fraction, by visual estimation, is 50 to 55%.   2. Spectral Doppler showsimpaired relaxation pattern of left ventricular myocardial filling (Grade I diastolic dysfunction).   3. Mild mitral valve regurgitation.   4. Mild aortic regurgitation.   5. There is moderate aortic root calcification.    < end of copied text >    Assessment and Plan:   85y old Male with no PMH who presents with cough, subsequently found to have inferior wall STEMI    #No clinical evidence of pneumonia.   -Right opacity on CXR, wide differential diagnoses  -Will plan for repeat CXR (PA and lateral if possible)    #Inferior wall MI:  -s/p emergent cath on  with RCA implant  -has LM 40% and LAD 80% occlusion  -repeat cath () results noted: 3 vessel disease, no further stents  -CT surgery consulted, recs noted: patient will need evaluation of functional status and pre-op workup[  -PT consult placed  -continue with DAPT and lipitor 80mg qd  -continue with metoprolol 12.5mg bid    #altered mental status (overnight ): resolved  -CTH negative for acute intracranial pathology  -BMP wnl  -likely ICU delirium vs sunding since mental status is improved this AM    #New onset paroxysmalAFib w/ RVR  -currently in NSR, no runs of Afib overnight  -continue with metoprolol 12.5mg bid  -holding off on a/c as AFib thought to be transient and related to recent catheterization   _____________________________________________  DVT ppx: lovenox subq  GI ppx: not indicated  Activity: ambulate as tolerated  Diet: DASH/TLC  Code Status: full code    DISPO: pending CT surgery follow-up; pending PT/rehab eval. Stable for downgrade to 3C

## 2020-02-13 NOTE — CHART NOTE - NSCHARTNOTEFT_GEN_A_CORE
ICU Transfer Note    Transfer from: CCU    Transfer to: (  ) Medicine    ( X ) Telemetry    (  ) RCU                               (  ) Palliative    (  ) Stroke Unit    (  ) MICU    (  ) CCU    Signout given to:     ----------------------------------------------------------------------------------------------------------  HPI / ICU COURSE:  85 year old male with no PMH and no cardiac risk factors presented to [facility] on 2/9 for cough and shortness of breath. Initially diagnosed with community-acquired pneumonia and started on azithromycin and rocephin. Patient was also in newly-discovered AFib with RVR on presentation. Overnight on the first night of admission, repeat EKG was done which demonstrated NSR with ST-segment elevations in inferior leads. Patient was loaded with aspirin and plavix and transferred to [place] [facility] for further management.    Upon arrival to [facility], patient underwent emergent LHC which demonstrated left main 40% lesion, proximal LAD aneurysm, mid LAD 80% stenosis, and RPDA thrombus/significant stenosis 9%. A stent was placed in RCA lesion and patient was planned for staged PCI for LM/LAD lesion. Patient was started on DAPT, low dose metoprolol, and lipitor 80mg. TTE demonstrated normal EF, grade 1 diastolic dysfunction, and mild MR/AR. LDL was 75, A1c 5.8%. Patient underwent repeat catheterization on 2/12 which demonstrated LM, LAD, and RPDA lesions; no stents were placed. Patient subsequently cleared for discharge from cardiology perspective. CT surgery was consulted for evaluation of CABG. Per CT surgery, PT eval is required to determine patient candidacy for CABG. Other workup including CT chest non-contrast and carotid duplex in progress currently. Patient is without chest pain and is hemodynamically stable at this time. Will transfer to telemetry floor per Dr. Rouse.  --------------------------------------------------------------------------------------------------------  PMH/PSH:  PAST MEDICAL & SURGICAL HISTORY:  No pertinent past medical history  No significant past surgical history      -------------------------------------------------------------------------------------------------------  PHYSICAL EXAM:  General: NAD  HEENT: Atraumatic  Respiratory: CTAB  Cardiac: RRR  Abdomen: soft, non-tender, non-distended. Cath insertion site dressing is clean, dry, and intact, no oozing or tenderness  Extremities: warm and well-perfused  Neuro: A+Ox4. No FND    Vital Signs Last 24 Hrs  T(C): 36.7 (13 Feb 2020 04:00), Max: 36.9 (12 Feb 2020 19:35)  T(F): 98 (13 Feb 2020 04:00), Max: 98.4 (12 Feb 2020 19:35)  HR: 72 (13 Feb 2020 11:28) (60 - 86)  BP: 114/67 (13 Feb 2020 11:28) (97/58 - 154/87)  BP(mean): 84 (13 Feb 2020 11:28) (69 - 111)  RR: 24 (13 Feb 2020 11:28) (19 - 37)  SpO2: 95% (13 Feb 2020 08:00) (94% - 98%)    I&O's Summary    12 Feb 2020 07:01  -  13 Feb 2020 07:00  --------------------------------------------------------  IN: 495 mL / OUT: 1000 mL / NET: -505 mL    13 Feb 2020 07:01  -  13 Feb 2020 13:13  --------------------------------------------------------  IN: 0 mL / OUT: 225 mL / NET: -225 mL        --------------------------------------------------------------------------------------------------------  LABS:   CARDIAC MARKERS ( 13 Feb 2020 11:32 )  x     / 3.28 ng/mL / x     / x     / x      CARDIAC MARKERS ( 13 Feb 2020 04:44 )  x     / 3.66 ng/mL / x     / x     / x                                  14.2   6.16  )-----------( 146      ( 13 Feb 2020 04:44 )             41.7       02-13    140  |  102  |  13  ----------------------------<  118<H>  3.9   |  22  |  0.9    Ca    8.8      13 Feb 2020 04:44  Phos  3.8     02-13  Mg     2.3     02-13    TPro  6.0  /  Alb  3.7  /  TBili  1.0  /  DBili  x   /  AST  35  /  ALT  28  /  AlkPhos  73  02-13              CULTURE RESULTS:                02-08-20 @ 23:10  Specimen Source: --  Method Type: --  Gram Stain - RRL: --  Gram Stain - Wound: --  Bacteria: --  Culture Results:   No growth      Specimen Source:   Method Type:   Gram Stain:   Culture Results: Culture Results:   No growth (02-08-20 @ 23:10)  Culture Results:   No growth to date. (02-08-20 @ 21:13)  Culture Results:   No growth to date. (02-08-20 @ 21:13)    Bacteria:       -------------------------------------------------------------------------------------------------  RADIOLOGY:  CATH SUMMARY/FINDINGS (2/12):    Coronary circulation: The coronary circulation is right dominant. There was significant left main disease. There was 2-vessel coronary artery disease (LAD and RCA). Left main: Angiography showed a single discrete lesion. Distal left main: There was a tubular 70 % stenosis. LAD: The vessel was heavily calcified. Angiography showed multiple discrete lesions. Proximal LAD: There was a diffuse 70 % stenosis. Mid LAD: There was a tubular 85 % stenosis. Distal vessel angiography showed minor luminal irregularities. Circumflex: Angiography showed minor luminal irregularities with no flow limiting lesions. Proximal circumflex: The vessel had an excessively ectatic focal segment. Ramus intermedius: Angiography showed minor luminal irregularities with no flow limiting lesions. Proximal RCA: Angiography showed moderate atherosclerosis with no clinical lesions appreciated. Mid RCA: Angiography showed minor luminal irregularities with no flow limiting lesions. Distal RCA: Angiography showed patent stent. Right PDA: Angiography showed a single discrete lesion. There was a tubular 95 % stenosis at the ostium of the vessel segment.     Lesion intervention: A percutaneous intervention was performed on the lesion in the left main. There was no dissection.   Intravascular ultrasound was performed.     CT surgery consulted    LEFT HEART CATHETERIZATION (2/9)    Left Main: distal LM 40% tubular lesion    LAD: 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, mid LAD 80% tubular stenosis at origin of D1, distal LAD luminal irregularities                 Diag: D1 luminal irregularities    Left 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, mid circ no disease, distal circ no disease     Right Coronary Artery: prox RCA mild disease, mid RCA moderate disease, distal % occlusion ZEFERINO 0 flow    seen after achieving reperfusion  RPDA: thrombus/significant stenosis 95% at the branch point of RPDA from distal RCA ZEFERINO 3 flow, rest of vessel no disease  RPL: no disease    Ramus Intermed: no disease    DOMINANCE: Right    ACCESS: Right femoral artery  CLOSURE: Perclose     INTERVENTION  IMPLANTS: Synergy 3.0x12mm to distal % lesion    POST-OP DIAGNOSIS  LM disease, 2V disease (RCA and LAD)    < from: Transthoracic Echocardiogram (02.09.20 @ 07:47) >  Summary:   1. Left ventricular ejection fraction, by visual estimation, is 50 to 55%.   2. Spectral Doppler showsimpaired relaxation pattern of left ventricular myocardial filling (Grade I diastolic dysfunction).   3. Mild mitral valve regurgitation.   4. Mild aortic regurgitation.   5. There is moderate aortic root calcification.    < end of copied text >      ---------------------------------------------------------------------------------------------------  ASSESSMENT & PLAN:   85y old Male with no PMH who presents with cough, subsequently found to have inferior wall STEMI    #Inferior wall MI:  -s/p emergent cath on 2/9 with RCA stent  -repeat cath (2/12) results noted: 3 vessel disease, no further stents  -CT surgery consulted, recs noted: patient will need evaluation of functional status and pre-op workup. Carotid duplex completed, CT chest done but read pending  -PT consult placed  -continue with DAPT and lipitor 80mg qd  -continue with metoprolol 12.5mg bid    #New onset paroxysmalAFib w/ RVR: resolved  -currently in NSR, no runs of Afib x 48 hours  -continue with metoprolol 12.5mg bid  -holding off on a/c as AFib thought to be transient and related to recent catheterization     #No clinical evidence of pneumonia.   -Right opacity on CXR, wide differential diagnoses  -Will plan for repeat CXR (PA and lateral if possible)    #Altered mental status (overnight 2/11): resolved  -CTH negative for acute intracranial pathology  -BMP wnl  -likely ICU delirium vs sundowning since mental status is improved this AM    _____________________________________________  DVT ppx: lovenox subq  GI ppx: not indicated  Activity: ambulate as tolerated  Diet: DASH/TLC  Code Status: full code    DISPO: pending CT surgery follow-up; pending PT/rehab eval. Stable for downgrade to 3C    FOR FOLLOW UP:  [ ] CT chest non-contrast results  [ ] PT/rehab eval  [ ] CT surgery f/u re: plans for CABG ICU Transfer Note    Transfer from: CCU    Transfer to: (  ) Medicine    ( X ) Telemetry    (  ) RCU                               (  ) Palliative    (  ) Stroke Unit    (  ) MICU    (  ) CCU    Signout given to:     ----------------------------------------------------------------------------------------------------------  HPI / ICU COURSE:  85 year old male with no PMH and no cardiac risk factors presented to [facility] on 2/9 for cough and shortness of breath. Initially diagnosed with community-acquired pneumonia and started on azithromycin and rocephin. Patient was also in newly-discovered AFib with RVR on presentation. Overnight on the first night of admission, repeat EKG was done which demonstrated NSR with ST-segment elevations in inferior leads. Patient was loaded with aspirin and plavix and transferred to [place] [facility] for further management.    Upon arrival to [facility], patient underwent emergent LHC which demonstrated left main 40% lesion, proximal LAD aneurysm, mid LAD 80% stenosis, and RPDA thrombus/significant stenosis 9%. A stent was placed in RCA lesion and patient was planned for staged PCI for LM/LAD lesion. Patient was started on DAPT, low dose metoprolol, and lipitor 80mg. TTE demonstrated normal EF, grade 1 diastolic dysfunction, and mild MR/AR. LDL was 75, A1c 5.8%. Patient underwent repeat catheterization on 2/12 which demonstrated LM, LAD, and RPDA lesions; no stents were placed. Patient subsequently cleared for discharge from cardiology perspective. CT surgery was consulted for evaluation of CABG. Per CT surgery, PT eval is required to determine patient candidacy for CABG. Other workup including CT chest non-contrast and carotid duplex in progress currently. Patient is without chest pain and is hemodynamically stable at this time. Will transfer to telemetry floor per Dr. Rouse. CT surgery to follow-up regarding timing of CABG.   --------------------------------------------------------------------------------------------------------  PMH/PSH:  PAST MEDICAL & SURGICAL HISTORY:  No pertinent past medical history  No significant past surgical history      -------------------------------------------------------------------------------------------------------  PHYSICAL EXAM:  General: NAD  HEENT: Atraumatic  Respiratory: CTAB  Cardiac: RRR  Abdomen: soft, non-tender, non-distended. Cath insertion site dressing is clean, dry, and intact, no oozing or tenderness  Extremities: warm and well-perfused  Neuro: A+Ox4. No FND    Vital Signs Last 24 Hrs  T(C): 36.7 (13 Feb 2020 04:00), Max: 36.9 (12 Feb 2020 19:35)  T(F): 98 (13 Feb 2020 04:00), Max: 98.4 (12 Feb 2020 19:35)  HR: 72 (13 Feb 2020 11:28) (60 - 86)  BP: 114/67 (13 Feb 2020 11:28) (97/58 - 154/87)  BP(mean): 84 (13 Feb 2020 11:28) (69 - 111)  RR: 24 (13 Feb 2020 11:28) (19 - 37)  SpO2: 95% (13 Feb 2020 08:00) (94% - 98%)    I&O's Summary    12 Feb 2020 07:01  -  13 Feb 2020 07:00  --------------------------------------------------------  IN: 495 mL / OUT: 1000 mL / NET: -505 mL    13 Feb 2020 07:01  -  13 Feb 2020 13:13  --------------------------------------------------------  IN: 0 mL / OUT: 225 mL / NET: -225 mL        --------------------------------------------------------------------------------------------------------  LABS:   CARDIAC MARKERS ( 13 Feb 2020 11:32 )  x     / 3.28 ng/mL / x     / x     / x      CARDIAC MARKERS ( 13 Feb 2020 04:44 )  x     / 3.66 ng/mL / x     / x     / x                                  14.2   6.16  )-----------( 146      ( 13 Feb 2020 04:44 )             41.7       02-13    140  |  102  |  13  ----------------------------<  118<H>  3.9   |  22  |  0.9    Ca    8.8      13 Feb 2020 04:44  Phos  3.8     02-13  Mg     2.3     02-13    TPro  6.0  /  Alb  3.7  /  TBili  1.0  /  DBili  x   /  AST  35  /  ALT  28  /  AlkPhos  73  02-13              CULTURE RESULTS:                02-08-20 @ 23:10  Specimen Source: --  Method Type: --  Gram Stain - RRL: --  Gram Stain - Wound: --  Bacteria: --  Culture Results:   No growth      Specimen Source:   Method Type:   Gram Stain:   Culture Results: Culture Results:   No growth (02-08-20 @ 23:10)  Culture Results:   No growth to date. (02-08-20 @ 21:13)  Culture Results:   No growth to date. (02-08-20 @ 21:13)    Bacteria:       -------------------------------------------------------------------------------------------------  RADIOLOGY:  CATH SUMMARY/FINDINGS (2/12):    Coronary circulation: The coronary circulation is right dominant. There was significant left main disease. There was 2-vessel coronary artery disease (LAD and RCA). Left main: Angiography showed a single discrete lesion. Distal left main: There was a tubular 70 % stenosis. LAD: The vessel was heavily calcified. Angiography showed multiple discrete lesions. Proximal LAD: There was a diffuse 70 % stenosis. Mid LAD: There was a tubular 85 % stenosis. Distal vessel angiography showed minor luminal irregularities. Circumflex: Angiography showed minor luminal irregularities with no flow limiting lesions. Proximal circumflex: The vessel had an excessively ectatic focal segment. Ramus intermedius: Angiography showed minor luminal irregularities with no flow limiting lesions. Proximal RCA: Angiography showed moderate atherosclerosis with no clinical lesions appreciated. Mid RCA: Angiography showed minor luminal irregularities with no flow limiting lesions. Distal RCA: Angiography showed patent stent. Right PDA: Angiography showed a single discrete lesion. There was a tubular 95 % stenosis at the ostium of the vessel segment.     Lesion intervention: A percutaneous intervention was performed on the lesion in the left main. There was no dissection.   Intravascular ultrasound was performed.     CT surgery consulted    LEFT HEART CATHETERIZATION (2/9)    Left Main: distal LM 40% tubular lesion    LAD: 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, mid LAD 80% tubular stenosis at origin of D1, distal LAD luminal irregularities                 Diag: D1 luminal irregularities    Left 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, mid circ no disease, distal circ no disease     Right Coronary Artery: prox RCA mild disease, mid RCA moderate disease, distal % occlusion ZEFERINO 0 flow    seen after achieving reperfusion  RPDA: thrombus/significant stenosis 95% at the branch point of RPDA from distal RCA ZEFERINO 3 flow, rest of vessel no disease  RPL: no disease    Ramus Intermed: no disease    DOMINANCE: Right    ACCESS: Right femoral artery  CLOSURE: Perclose     INTERVENTION  IMPLANTS: Synergy 3.0x12mm to distal % lesion    POST-OP DIAGNOSIS  LM disease, 2V disease (RCA and LAD)    < from: Transthoracic Echocardiogram (02.09.20 @ 07:47) >  Summary:   1. Left ventricular ejection fraction, by visual estimation, is 50 to 55%.   2. Spectral Doppler showsimpaired relaxation pattern of left ventricular myocardial filling (Grade I diastolic dysfunction).   3. Mild mitral valve regurgitation.   4. Mild aortic regurgitation.   5. There is moderate aortic root calcification.    < end of copied text >  < from: CT Chest No Cont (02.12.20 @ 21:24) >    IMPRESSION:    Cardiomegaly with trace pericardial effusion. Coronary artery disease, severe at the LAD.    Ascending aorta caliber at the upper limit of normal (4.0 cm). Calcified aortic valve. The ascending aorta is free of calcified plaque. Atherosclerotic calcifications are noted at the aortic arch and descending thoracic aorta.    Bilateral calcified pleural plaques.  Right basilar consolidation/atelectasis.    Right renal cyst. Punctate hyperdensities within the bilateral renal collecting system, which may represent recent intervention with contrast material versus punctate nonobstructing renal calculi. Correlate clinically.    < end of copied text >    < from: VA Duplex Carotid, Bilat (02.12.20 @ 17:47) >    Impression:    20-39% stenosis of the right internal carotid artery.    20-39% stenosis of the left internal carotid artery.    < end of copied text >        ---------------------------------------------------------------------------------------------------  ASSESSMENT & PLAN:   85y old Male with no PMH who presents with cough, subsequently found to have inferior wall STEMI    #Inferior wall MI:  -s/p emergent cath on 2/9 with RCA stent  -repeat cath (2/12) results noted: 3 vessel disease, no further stents  -CT surgery consulted, recs noted: patient will need evaluation of functional status and pre-op workup. Carotid duplex completed, CT chest done but read pending  -PT consult placed  -continue with DAPT and lipitor 80mg qd  -continue with metoprolol 12.5mg bid    #New onset paroxysmalAFib w/ RVR: resolved  -currently in NSR, no runs of Afib x 48 hours  -continue with metoprolol 12.5mg bid  -holding off on a/c as AFib thought to be transient and related to recent catheterization     #No clinical evidence of pneumonia.   -Right opacity on CXR, wide differential diagnoses  -no evidnece of consolidation on recent CT chest    #Altered mental status (overnight 2/11): resolved  -CTH negative for acute intracranial pathology  -BMP wnl  -likely ICU delirium vs sundowning since mental status is improved this AM    _____________________________________________  DVT ppx: lovenox subq  GI ppx: not indicated  Activity: ambulate as tolerated  Diet: DASH/TLC  Code Status: full code    DISPO: pending CT surgery follow-up. PT/rehab cleared patient for home, patient does not require services. Stable for downgrade to telemetry    FOR FOLLOW UP:  [ ] CT surgery f/u re: plans for CABG

## 2020-02-14 LAB
ALBUMIN SERPL ELPH-MCNC: 3.6 G/DL — SIGNIFICANT CHANGE UP (ref 3.5–5.2)
ALP SERPL-CCNC: 73 U/L — SIGNIFICANT CHANGE UP (ref 30–115)
ALT FLD-CCNC: 29 U/L — SIGNIFICANT CHANGE UP (ref 0–41)
ANION GAP SERPL CALC-SCNC: 13 MMOL/L — SIGNIFICANT CHANGE UP (ref 7–14)
AST SERPL-CCNC: 33 U/L — SIGNIFICANT CHANGE UP (ref 0–41)
BASOPHILS # BLD AUTO: 0.03 K/UL — SIGNIFICANT CHANGE UP (ref 0–0.2)
BASOPHILS NFR BLD AUTO: 0.4 % — SIGNIFICANT CHANGE UP (ref 0–1)
BILIRUB SERPL-MCNC: 0.9 MG/DL — SIGNIFICANT CHANGE UP (ref 0.2–1.2)
BUN SERPL-MCNC: 13 MG/DL — SIGNIFICANT CHANGE UP (ref 10–20)
CALCIUM SERPL-MCNC: 8.8 MG/DL — SIGNIFICANT CHANGE UP (ref 8.5–10.1)
CHLORIDE SERPL-SCNC: 104 MMOL/L — SIGNIFICANT CHANGE UP (ref 98–110)
CK MB CFR SERPL CALC: 2.9 NG/ML — SIGNIFICANT CHANGE UP (ref 0.6–6.3)
CK SERPL-CCNC: 114 U/L — SIGNIFICANT CHANGE UP (ref 0–225)
CO2 SERPL-SCNC: 23 MMOL/L — SIGNIFICANT CHANGE UP (ref 17–32)
CREAT SERPL-MCNC: 1 MG/DL — SIGNIFICANT CHANGE UP (ref 0.7–1.5)
CULTURE RESULTS: SIGNIFICANT CHANGE UP
CULTURE RESULTS: SIGNIFICANT CHANGE UP
EOSINOPHIL # BLD AUTO: 0.15 K/UL — SIGNIFICANT CHANGE UP (ref 0–0.7)
EOSINOPHIL NFR BLD AUTO: 2.1 % — SIGNIFICANT CHANGE UP (ref 0–8)
GLUCOSE SERPL-MCNC: 106 MG/DL — HIGH (ref 70–99)
HCT VFR BLD CALC: 42 % — SIGNIFICANT CHANGE UP (ref 42–52)
HGB BLD-MCNC: 14.5 G/DL — SIGNIFICANT CHANGE UP (ref 14–18)
IMM GRANULOCYTES NFR BLD AUTO: 0.3 % — SIGNIFICANT CHANGE UP (ref 0.1–0.3)
LYMPHOCYTES # BLD AUTO: 1.8 K/UL — SIGNIFICANT CHANGE UP (ref 1.2–3.4)
LYMPHOCYTES # BLD AUTO: 24.7 % — SIGNIFICANT CHANGE UP (ref 20.5–51.1)
MAGNESIUM SERPL-MCNC: 2.3 MG/DL — SIGNIFICANT CHANGE UP (ref 1.8–2.4)
MCHC RBC-ENTMCNC: 33.2 PG — HIGH (ref 27–31)
MCHC RBC-ENTMCNC: 34.5 G/DL — SIGNIFICANT CHANGE UP (ref 32–37)
MCV RBC AUTO: 96.1 FL — HIGH (ref 80–94)
MONOCYTES # BLD AUTO: 0.66 K/UL — HIGH (ref 0.1–0.6)
MONOCYTES NFR BLD AUTO: 9.1 % — SIGNIFICANT CHANGE UP (ref 1.7–9.3)
NEUTROPHILS # BLD AUTO: 4.62 K/UL — SIGNIFICANT CHANGE UP (ref 1.4–6.5)
NEUTROPHILS NFR BLD AUTO: 63.4 % — SIGNIFICANT CHANGE UP (ref 42.2–75.2)
NRBC # BLD: 0 /100 WBCS — SIGNIFICANT CHANGE UP (ref 0–0)
PHOSPHATE SERPL-MCNC: 3.7 MG/DL — SIGNIFICANT CHANGE UP (ref 2.1–4.9)
PLATELET # BLD AUTO: 153 K/UL — SIGNIFICANT CHANGE UP (ref 130–400)
POTASSIUM SERPL-MCNC: 4.1 MMOL/L — SIGNIFICANT CHANGE UP (ref 3.5–5)
POTASSIUM SERPL-SCNC: 4.1 MMOL/L — SIGNIFICANT CHANGE UP (ref 3.5–5)
PROT SERPL-MCNC: 6 G/DL — SIGNIFICANT CHANGE UP (ref 6–8)
RBC # BLD: 4.37 M/UL — LOW (ref 4.7–6.1)
RBC # FLD: 12.2 % — SIGNIFICANT CHANGE UP (ref 11.5–14.5)
SODIUM SERPL-SCNC: 140 MMOL/L — SIGNIFICANT CHANGE UP (ref 135–146)
SPECIMEN SOURCE: SIGNIFICANT CHANGE UP
SPECIMEN SOURCE: SIGNIFICANT CHANGE UP
TROPONIN T SERPL-MCNC: 3.41 NG/ML — CRITICAL HIGH
WBC # BLD: 7.28 K/UL — SIGNIFICANT CHANGE UP (ref 4.8–10.8)
WBC # FLD AUTO: 7.28 K/UL — SIGNIFICANT CHANGE UP (ref 4.8–10.8)

## 2020-02-14 PROCEDURE — 93010 ELECTROCARDIOGRAM REPORT: CPT

## 2020-02-14 PROCEDURE — 99233 SBSQ HOSP IP/OBS HIGH 50: CPT

## 2020-02-14 RX ORDER — LISINOPRIL 2.5 MG/1
2.5 TABLET ORAL DAILY
Refills: 0 | Status: DISCONTINUED | OUTPATIENT
Start: 2020-02-14 | End: 2020-02-15

## 2020-02-14 RX ADMIN — ATORVASTATIN CALCIUM 80 MILLIGRAM(S): 80 TABLET, FILM COATED ORAL at 21:49

## 2020-02-14 RX ADMIN — CLOPIDOGREL BISULFATE 75 MILLIGRAM(S): 75 TABLET, FILM COATED ORAL at 12:00

## 2020-02-14 RX ADMIN — ENOXAPARIN SODIUM 40 MILLIGRAM(S): 100 INJECTION SUBCUTANEOUS at 12:01

## 2020-02-14 RX ADMIN — CHLORHEXIDINE GLUCONATE 1 APPLICATION(S): 213 SOLUTION TOPICAL at 06:10

## 2020-02-14 RX ADMIN — Medication 12.5 MILLIGRAM(S): at 06:09

## 2020-02-14 RX ADMIN — Medication 12.5 MILLIGRAM(S): at 18:08

## 2020-02-14 RX ADMIN — Medication 81 MILLIGRAM(S): at 12:00

## 2020-02-14 RX ADMIN — PANTOPRAZOLE SODIUM 40 MILLIGRAM(S): 20 TABLET, DELAYED RELEASE ORAL at 06:09

## 2020-02-14 NOTE — PHYSICAL THERAPY INITIAL EVALUATION ADULT - PERTINENT HX OF CURRENT PROBLEM, REHAB EVAL
84yo male traveling from [place] presented to ER with chill. In the ER found to have rapid atrial fibrillation along. No chest pain (08 Feb 2020 23:00), r/i mi. Prior to hospitalization he was ambulating independent

## 2020-02-14 NOTE — PROGRESS NOTE ADULT - SUBJECTIVE AND OBJECTIVE BOX
Hospital Day:  5d    Chief Complaint: Patient is a 85y old Male with no PMH who presents with cough, subsequently found to have inferior wall STEMI.    24 hour events:  -Pre-op evaluation completed for possible CABG  -Patient walked up and down hallway yesterday with no chest pain, palpitations, SOB, lightheadedness/dizziness    Subjective:  -Feels fine, in good spirits, ready to go home  -Denies CP, SOB, palpitations, fevers/chills, abdominal pain, nausea/vomiting, diarrhea     Past Medical Hx:   No pertinent past medical history    Past Sx:  No significant past surgical history    Allergies:  No Known Allergies    Current Meds:   Standing Meds:  aspirin  chewable 81 milliGRAM(s) Oral daily  atorvastatin 80 milliGRAM(s) Oral at bedtime  chlorhexidine 4% Liquid 1 Application(s) Topical <User Schedule>  clopidogrel Tablet 75 milliGRAM(s) Oral daily  enoxaparin Injectable 40 milliGRAM(s) SubCutaneous daily  metoprolol tartrate 12.5 milliGRAM(s) Oral two times a day  pantoprazole    Tablet 40 milliGRAM(s) Oral before breakfast    PRN Meds:    HOME MEDICATIONS:    Vital Signs:   T(F): 97.7 (20 @ 08:00), Max: 97.8 (20 @ 00:00)  HR: 64 (20 @ 08:00) (64 - 102)  BP: 107/68 (20 @ 08:00) (95/68 - 133/87)  RR: 18 (20 @ 08:00) (18 - 35)  SpO2: 95% (20 @ 06:00) (93% - 96%)      20 @ 07:01  -  20 @ 07:00  --------------------------------------------------------  IN: 760 mL / OUT: 825 mL / NET: -65 mL    Physical Exam:   GENERAL: NAD  HEENT: NCAT  CHEST/LUNG: CTAB  HEART: Regular rate and rhythm; s1 s2 appreciated, No murmurs, rubs, or gallops  ABDOMEN: Soft, Nontender, Nondistended; Bowel sounds present  EXTREMITIES: No LE edema b/l  NERVOUS SYSTEM:  Alert & Oriented X3    Labs:                         14.5   7.28  )-----------( 153      ( 2020 04:23 )             42.0     Neutophil% 63.4, Lymphocyte% 24.7, Monocyte% 9.1, Bands% 0.3 20 @ 04:23    2020 04:23    140    |  104    |  13     ----------------------------<  106    4.1     |  23     |  1.0      Ca    8.8        2020 04:23  Phos  3.7       2020 04:23  Mg     2.3       2020 04:23    TPro  6.0    /  Alb  3.6    /  TBili  0.9    /  DBili  x      /  AST  33     /  ALT  29     /  AlkPhos  73     2020 04:23    Hemoglobin A1C, Whole Blood: 5.8 % (20 @ 11:00)    Serum Pro-Brain Natriuretic Peptide: 444 pg/mL (20 @ 01:50)    Troponin 3.41, CKMB 2.9,  20 @ 04:23  Troponin 3.28, CKMB --, CK -- 20 @ 11:32  Troponin 3.66, CKMB --, CK -- 20 @ 04:44    TSH --, Free T4 --, T3 (total)111 20 @ 04:44         Urinalysis Basic - ( 2020 00:00 )    Color: Light Yellow / Appearance: Clear / S.017 / pH: x  Gluc: x / Ketone: Small  / Bili: Negative / Urobili: <2 mg/dL   Blood: x / Protein: Negative / Nitrite: Negative   Leuk Esterase: Negative / RBC: x / WBC x   Sq Epi: x / Non Sq Epi: x / Bacteria: x      Culture - Blood (collected 20 @ 21:13)  Source: .Blood Blood-Peripheral  Preliminary Report (02-10-20 @ 17:02):    No growth to date.    Culture - Blood (collected 20 @ 21:13)  Source: .Blood Blood-Peripheral  Preliminary Report (02-10-20 @ 17:02):    No growth to date.    Radiology:         CATH SUMMARY/FINDINGS ():    Coronary circulation: The coronary circulation is right dominant. There was significant left main disease. There was 2-vessel coronary artery disease (LAD and RCA). Left main: Angiography showed a single discrete lesion. Distal left main: There was a tubular 70 % stenosis. LAD: The vessel was heavily calcified. Angiography showed multiple discrete lesions. Proximal LAD: There was a diffuse 70 % stenosis. Mid LAD: There was a tubular 85 % stenosis. Distal vessel angiography showed minor luminal irregularities. Circumflex: Angiography showed minor luminal irregularities with no flow limiting lesions. Proximal circumflex: The vessel had an excessively ectatic focal segment. Ramus intermedius: Angiography showed minor luminal irregularities with no flow limiting lesions. Proximal RCA: Angiography showed moderate atherosclerosis with no clinical lesions appreciated. Mid RCA: Angiography showed minor luminal irregularities with no flow limiting lesions. Distal RCA: Angiography showed patent stent. Right PDA: Angiography showed a single discrete lesion. There was a tubular 95 % stenosis at the ostium of the vessel segment.     Lesion intervention: A percutaneous intervention was performed on the lesion in the left main. There was no dissection.   Intravascular ultrasound was performed.     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, mid circ no disease, distal circ no disease     Right Coronary Artery: prox RCA mild disease, mid RCA moderate disease, distal % occlusion ZEFERINO 0 flow    seen after achieving reperfusion  RPDA: thrombus/significant stenosis 95% at the branch point of RPDA from distal RCA ZEFERINO 3 flow, rest of vessel no disease  RPL: no disease    Ramus Intermed: no disease    DOMINANCE: Right    ACCESS: Right femoral artery  CLOSURE: Perclose     INTERVENTION  IMPLANTS: Synergy 3.0x12mm to distal % lesion    POST-OP DIAGNOSIS  LM disease, 2V disease (RCA and LAD)    < from: Transthoracic Echocardiogram (20 @ 07:47) >  Summary:   1. Left ventricular ejection fraction, by visual estimation, is 50 to 55%.   2. Spectral Doppler showsimpaired relaxation pattern of left ventricular myocardial filling (Grade I diastolic dysfunction).   3. Mild mitral valve regurgitation.   4. Mild aortic regurgitation.   5. There is moderate aortic root calcification.    < end of copied text >    Assessment and Plan:   85y old Male with no PMH who presents with cough, subsequently found to have inferior wall STEMI    #No clinical evidence of pneumonia.   -Right opacity on CXR, wide differential diagnoses  -Will plan for repeat CXR (PA and lateral if possible)    #Inferior wall MI:  -s/p emergent cath on  with RCA implant  -has LM 40% and LAD 80% occlusion  -repeat cath () results noted: 3 vessel disease, no further stents  -CT surgery consulted, recs noted: patient will need evaluation of functional status and pre-op workup[  -PT consult placed  -continue with DAPT and lipitor 80mg qd  -continue with metoprolol 12.5mg bid    #altered mental status (overnight ): resolved  -CTH negative for acute intracranial pathology  -BMP wnl  -likely ICU delirium vs sundowning since mental status is improved this AM    #New onset paroxysmalAFib w/ RVR  -currently in NSR, no runs of Afib overnight  -continue with metoprolol 12.5mg bid  -holding off on a/c as AFib thought to be transient and related to recent catheterization   _____________________________________________  DVT ppx: lovenox subq  GI ppx: not indicated  Activity: ambulate as tolerated  Diet: DASH/TLC  Code Status: full code    DISPO: pending CT surgery follow-up; pending PT/rehab eval. Stable for downgrade to 3C Hospital Day:  5d    Chief Complaint: Patient is a 85y old Male with no PMH who presents with cough, subsequently found to have inferior wall STEMI.    24 hour events:  -Pre-op evaluation completed for possible CABG  -Patient walked up and down hallway yesterday with no chest pain, palpitations, SOB, lightheadedness/dizziness    Subjective:  -Feels fine, in good spirits, ready to go home  -Denies CP, SOB, palpitations, fevers/chills, abdominal pain, nausea/vomiting, diarrhea     Past Medical Hx:   No pertinent past medical history    Past Sx:  No significant past surgical history    Allergies:  No Known Allergies    Current Meds:   Standing Meds:  aspirin  chewable 81 milliGRAM(s) Oral daily  atorvastatin 80 milliGRAM(s) Oral at bedtime  chlorhexidine 4% Liquid 1 Application(s) Topical <User Schedule>  clopidogrel Tablet 75 milliGRAM(s) Oral daily  enoxaparin Injectable 40 milliGRAM(s) SubCutaneous daily  metoprolol tartrate 12.5 milliGRAM(s) Oral two times a day  pantoprazole    Tablet 40 milliGRAM(s) Oral before breakfast    PRN Meds:    HOME MEDICATIONS:    Vital Signs:   T(F): 97.7 (20 @ 08:00), Max: 97.8 (20 @ 00:00)  HR: 64 (20 @ 08:00) (64 - 102)  BP: 107/68 (20 @ 08:00) (95/68 - 133/87)  RR: 18 (20 @ 08:00) (18 - 35)  SpO2: 95% (20 @ 06:00) (93% - 96%)      20 @ 07:01  -  20 @ 07:00  --------------------------------------------------------  IN: 760 mL / OUT: 825 mL / NET: -65 mL    Physical Exam:   GENERAL: NAD  HEENT: NCAT  CHEST/LUNG: CTAB  HEART: Regular rate and rhythm; s1 s2 appreciated, No murmurs, rubs, or gallops  ABDOMEN: Soft, Nontender, Nondistended; Bowel sounds present  EXTREMITIES: No LE edema b/l  NERVOUS SYSTEM:  Alert & Oriented X3    Labs:                         14.5   7.28  )-----------( 153      ( 2020 04:23 )             42.0     Neutophil% 63.4, Lymphocyte% 24.7, Monocyte% 9.1, Bands% 0.3 20 @ 04:23    2020 04:23    140    |  104    |  13     ----------------------------<  106    4.1     |  23     |  1.0      Ca    8.8        2020 04:23  Phos  3.7       2020 04:23  Mg     2.3       2020 04:23    TPro  6.0    /  Alb  3.6    /  TBili  0.9    /  DBili  x      /  AST  33     /  ALT  29     /  AlkPhos  73     2020 04:23    Hemoglobin A1C, Whole Blood: 5.8 % (20 @ 11:00)    Serum Pro-Brain Natriuretic Peptide: 444 pg/mL (20 @ 01:50)    Troponin 3.41, CKMB 2.9,  20 @ 04:23  Troponin 3.28, CKMB --, CK -- 20 @ 11:32  Troponin 3.66, CKMB --, CK -- 20 @ 04:44    TSH --, Free T4 --, T3 (total)111 20 @ 04:44         Urinalysis Basic - ( 2020 00:00 )    Color: Light Yellow / Appearance: Clear / S.017 / pH: x  Gluc: x / Ketone: Small  / Bili: Negative / Urobili: <2 mg/dL   Blood: x / Protein: Negative / Nitrite: Negative   Leuk Esterase: Negative / RBC: x / WBC x   Sq Epi: x / Non Sq Epi: x / Bacteria: x      Culture - Blood (collected 20 @ 21:13)  Source: .Blood Blood-Peripheral  Preliminary Report (02-10-20 @ 17:02):    No growth to date.    Culture - Blood (collected 20 @ 21:13)  Source: .Blood Blood-Peripheral  Preliminary Report (02-10-20 @ 17:02):    No growth to date.    Radiology:   < from: CT Chest No Cont (20 @ 21:24) >  IMPRESSION:    Cardiomegaly with trace pericardial effusion. Coronary artery disease, severe at the LAD.    Ascending aorta caliber at the upper limit of normal (4.0 cm). Calcified aortic valve. The ascending aorta is free of calcified plaque. Atherosclerotic calcifications are noted at the aortic arch and descending thoracic aorta.    Bilateral calcified pleural plaques.  Right basilar consolidation/atelectasis.    Right renal cyst. Punctate hyperdensities within the bilateral renal collecting system, which may represent recent intervention with contrast material versus punctate nonobstructing renal calculi. Correlate clinically.    < end of copied text >    < from: VA Duplex Carotid, Bilat (20 @ 17:47) >  Impression:    20-39% stenosis of the right internal carotid artery.    20-39% stenosis of the left internal carotid artery.    < end of copied text >      CATH SUMMARY/FINDINGS ():    Coronary circulation: The coronary circulation is right dominant. There was significant left main disease. There was 2-vessel coronary artery disease (LAD and RCA). Left main: Angiography showed a single discrete lesion. Distal left main: There was a tubular 70 % stenosis. LAD: The vessel was heavily calcified. Angiography showed multiple discrete lesions. Proximal LAD: There was a diffuse 70 % stenosis. Mid LAD: There was a tubular 85 % stenosis. Distal vessel angiography showed minor luminal irregularities. Circumflex: Angiography showed minor luminal irregularities with no flow limiting lesions. Proximal circumflex: The vessel had an excessively ectatic focal segment. Ramus intermedius: Angiography showed minor luminal irregularities with no flow limiting lesions. Proximal RCA: Angiography showed moderate atherosclerosis with no clinical lesions appreciated. Mid RCA: Angiography showed minor luminal irregularities with no flow limiting lesions. Distal RCA: Angiography showed patent stent. Right PDA: Angiography showed a single discrete lesion. There was a tubular 95 % stenosis at the ostium of the vessel segment.     Lesion intervention: A percutaneous intervention was performed on the lesion in the left main. There was no dissection.   Intravascular ultrasound was performed.     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, mid circ no disease, distal circ no disease     Right Coronary Artery: prox RCA mild disease, mid RCA moderate disease, distal % occlusion ZEFERINO 0 flow    seen after achieving reperfusion  RPDA: thrombus/significant stenosis 95% at the branch point of RPDA from distal RCA ZEFERINO 3 flow, rest of vessel no disease  RPL: no disease    Ramus Intermed: no disease    DOMINANCE: Right    ACCESS: Right femoral artery  CLOSURE: Perclose     INTERVENTION  IMPLANTS: Synergy 3.0x12mm to distal % lesion    POST-OP DIAGNOSIS  LM disease, 2V disease (RCA and LAD)    < from: Transthoracic Echocardiogram (20 @ 07:47) >  Summary:   1. Left ventricular ejection fraction, by visual estimation, is 50 to 55%.   2. Spectral Doppler showsimpaired relaxation pattern of left ventricular myocardial filling (Grade I diastolic dysfunction).   3. Mild mitral valve regurgitation.   4. Mild aortic regurgitation.   5. There is moderate aortic root calcification.    < end of copied text >    Assessment and Plan:   85y old Male with no PMH who presents with cough, subsequently found to have inferior wall STEMI    #No clinical evidence of pneumonia.   -Right opacity on CXR, wide differential diagnoses  -Will plan for repeat CXR (PA and lateral if possible)    #Inferior wall MI:  -s/p emergent cath on  with RCA implant  -has LM 40% and LAD 80% occlusion  -repeat cath () results noted: 3 vessel disease, no further stents  -pre-op workup for CABG completed, carotid duplex and CT chest as noted above. CT surgery will re-evaluate patient today in light of workup for whether CABG will be done in-patient or as an outpatient. Patient is cleared for discharge from cardiology perspective, so if CABG not being done during this admission, patient will be discharged home  -continue with DAPT and lipitor 80mg qd  -continue with metoprolol 12.5mg bid    #altered mental status (overnight ): resolved  -CTH negative for acute intracranial pathology  -BMP wnl  -likely ICU delirium vs sunding since mental status is improved this AM    #New onset paroxysmalAFib w/ RVR  -currently in NSR, no runs of Afib overnight  -continue with metoprolol 12.5mg bid  -holding off on a/c as AFib thought to be transient and related to recent catheterization   _____________________________________________  DVT ppx: lovenox subq  GI ppx: not indicated  Activity: ambulate as tolerated  Diet: DASH/TLC  Code Status: full code    DISPO: pending CT surgery follow-up;  Stable for downgrade to 3C; if no CABG inpatient, stable for discharge home

## 2020-02-14 NOTE — PROGRESS NOTE ADULT - SUBJECTIVE AND OBJECTIVE BOX
Patient is a 85y old  Male who presents with a chief complaint of STEMI (13 Feb 2020 07:21)    Patient was seen and examined.  S/p cath on 2/13/20 --->sig LM, LAD and RPDA disease  no new events  Denies chest pain, sob, palpitation, dizziness  All systems reviewed.    PAST MEDICAL & SURGICAL HISTORY:  No pertinent past medical history  No significant past surgical history    Allergies  No Known Allergies    MEDICATIONS  (STANDING):  aspirin  chewable 81 milliGRAM(s) Oral daily  atorvastatin 80 milliGRAM(s) Oral at bedtime  chlorhexidine 4% Liquid 1 Application(s) Topical <User Schedule>  clopidogrel Tablet 75 milliGRAM(s) Oral daily  enoxaparin Injectable 40 milliGRAM(s) SubCutaneous daily  lisinopril 2.5 milliGRAM(s) Oral daily  metoprolol tartrate 12.5 milliGRAM(s) Oral two times a day  pantoprazole    Tablet 40 milliGRAM(s) Oral before breakfast    MEDICATIONS  (PRN):    T(C): 35.5 (02-14-20 @ 12:00), Max: 36.6 (02-14-20 @ 00:00)  HR: 80 (02-14-20 @ 14:00) (64 - 102)  BP: 99/70 (02-14-20 @ 14:00) (95/68 - 124/70)  RR: 251 (02-14-20 @ 14:00) (16 - 251)  SpO2: 94% (02-14-20 @ 14:00) (93% - 96%)    O/E:  Awake, alert, not in distress.  HEENT: atraumatic, EOMI.  Chest: decreased breath sounds at bases  CVS: SIS2 +, no murmur.   P/A: Soft, BS+  CNS: non focal.  Ext: no edema feet.  Skin: no rash, no ulcers.  All systems reviewed positive findings as above.                          14.5   7.28  )-----------( 153      ( 14 Feb 2020 04:23 )             42.0                         14.2   6.16  )-----------( 146      ( 13 Feb 2020 04:44 )             41.7<L>  02-14    140  |  104  |  13  ----------------------------<  106<H>  4.1   |  23  |  1.0  02-13    140  |  102  |  13  ----------------------------<  118<H>  3.9   |  22  |  0.9    Ca    8.8      14 Feb 2020 04:23  Ca    8.8      13 Feb 2020 04:44  Phos  3.7     02-14  Mg     2.3     02-14    TPro  6.0  /  Alb  3.6  /  TBili  0.9  /  DBili  x   /  AST  33  /  ALT  29  /  AlkPhos  73  02-14  TPro  6.0  /  Alb  3.7  /  TBili  1.0  /  DBili  x   /  AST  35  /  ALT  28  /  AlkPhos  73  02-13

## 2020-02-14 NOTE — PROGRESS NOTE ADULT - SUBJECTIVE AND OBJECTIVE BOX
Cardiology Follow up    LOKESH HUNT   85y Male  PAST MEDICAL & SURGICAL HISTORY:  No pertinent past medical history  No significant past surgical history       HPI:  84yo male traveling from [place] presented to ER with chill. In the ER found to have rapid atrial fibrillationalong. No chest pain (08 Feb 2020 23:00)    Allergies    No Known Allergies    Intolerances    Patient without complaints. Pt ambulated without issues/symptoms  Denies CP, SOB, palpitations, or dizziness  No events on telemetry overnight    Vital Signs Last 24 Hrs  T(C): 36.5 (14 Feb 2020 08:00), Max: 36.6 (14 Feb 2020 00:00)  T(F): 97.7 (14 Feb 2020 08:00), Max: 97.8 (14 Feb 2020 00:00)  HR: 64 (14 Feb 2020 08:00) (64 - 102)  BP: 107/68 (14 Feb 2020 08:00) (95/68 - 124/70)  BP(mean): 81 (14 Feb 2020 08:00) (72 - 88)  RR: 18 (14 Feb 2020 08:00) (18 - 35)  SpO2: 95% (14 Feb 2020 06:00) (93% - 96%)    MEDICATIONS  (STANDING):  aspirin  chewable 81 milliGRAM(s) Oral daily  atorvastatin 80 milliGRAM(s) Oral at bedtime  chlorhexidine 4% Liquid 1 Application(s) Topical <User Schedule>  clopidogrel Tablet 75 milliGRAM(s) Oral daily  enoxaparin Injectable 40 milliGRAM(s) SubCutaneous daily  metoprolol tartrate 12.5 milliGRAM(s) Oral two times a day  pantoprazole    Tablet 40 milliGRAM(s) Oral before breakfast    REVIEW OF SYSTEMS:          CONSTITUTIONAL: No weakness, fevers or chills          EYES/ENT: No visual changes;  No vertigo or throat pain           NECK: No pain or stiffness          RESPIRATORY: No cough, wheezing, hemoptysis          CARDIOVASCULAR: no pain, no COOPER, no palpitations           GASTROINTESTINAL: No abdominal or epigastric pain. No nausea, vomiting, or hematemesis;           GENITOURINARY: No dysuria, frequency or hematuria          NEUROLOGICAL: No numbness or weakness          SKIN: No itching, rashes    PHYSICAL EXAM:           CONSTITUTIONAL: Well-developed; well-nourished; in no acute distress  	SKIN: warm, dry  	HEAD: Normocephalic; atraumatic  	EYES: PERRL.  	ENT: No nasal discharge, airway clear, mucous membranes moist  	NECK: Supple; non tender.  	CARD: +S1, +S2, no murmurs, gallops, or rubs. Regular rate and rhythm    	RESP: No wheezes, rales or rhonchi. CTA B/L  	ABD: soft ntnd, + BS x 4 quadrants  	EXT: moves all extremities,  no clubbing, cyanosis or edema  	NEURO: Alert and oriented x3, no focal deficits          PSYCH: Cooperative, appropriate          VASCULAR:  +2 Rad / +2 PTs / + 2 DPs          EXTREMITY:             Right Groin:  Dressing D/C/I, pressure dressing removed, access site soft, no hematoma, no pain, + pulses/same as baseline, no sign of infection, no numbness  	   Right Radial: Dressing D/C/I, pressure dressing removed, access site soft, no hematoma, no pain, + pulses/same as baseline, no sign of infection, no numbness            ECG:   < from: 12 Lead ECG (02.14.20 @ 07:20) >  Ventricular Rate 61 BPM    Atrial Rate 61 BPM    P-R Interval 220 ms    QRS Duration 90 ms    Q-T Interval 422 ms    QTC Calculation(Bezet) 424 ms    P Axis 7 degrees    R Axis -21 degrees    T Axis 49 degrees    Diagnosis Line Sinus rhythm with 1st degree A-V block  Low voltage QRS  Inferior infarct , age undetermined  Cannot rule out Anterior infarct , age undetermined  Abnormal ECG    Confirmed by JULIO C SALMERON MD ([id number]) on 2/14/2020 8:33:02 AM                                                                                                           2D ECHO:  < from: Transthoracic Echocardiogram (02.09.20 @ 07:47) >  EXAM:  2-D ECHO (TTE) COMPLETE        PROCEDURE DATE:  02/09/2020      INTERPRETATION:  REPORT:    TRANSTHORACIC ECHOCARDIOGRAM REPORT         Patient Name:   LOKESH HUNT Accession #: [id number]  Medical Rec #:  [id number]       Height:      72.0in 182.9 cm  YOB: 1934       Weight:      209.0 lb 94.80 kg  Patient Age:    85 years        BSA:         2.17 m²  Patient Gender: M               BP:          98/55 mmHg       Date of Exam:        2/9/2020 7:47:55 AM  Referring Physician: [id number] ED UNASSIGNED  Sonographer:         ROBERT  Reading Physician:   Bobby Duval M.D.    Procedure:    2D Echo/Doppler/Color Doppler Complete.  Indications:  I21.3 - ST Elevation STEMI Myocardial Infarction of unspecified  Diagnosis:    ST elevation (STEMI) myocardial infarction of unspecified site -                I21.3  Study         Technically good study.  Details:         Summary:   1. Left ventricular ejection fraction, by visual estimation, is 50 to 55%.   2. Spectral Doppler showsimpaired relaxation pattern of left ventricular myocardial filling (Grade I diastolic dysfunction).   3. Mild mitral valve regurgitation.   4. Mild aortic regurgitation.   5. There is moderate aortic root calcification.    PHYSICIAN INTERPRETATION:  Left Ventricle: The left ventricular internal cavity size is normal. Left ventricular wall thickness is normal. Left ventricular ejection fraction, by visual estimation, is 50 to 55%. Spectral Doppler shows impaired relaxation pattern of left ventricular myocardial filling (Grade I diastolic dysfunction).  Right Ventricle: The right ventricular size is mildly enlarged. RV systolic function is mildly reduced.  Left Atrium: Mildly enlarged left atrium.  Right Atrium: Mildly enlarged right atrium.  Mitral Valve: Structurally normal mitral valve, with normal leaflet excursion. No evidence of mitral valve stenosis. Mild mitral valve regurgitation is seen.  Tricuspid Valve: Structurally normal tricuspid valve, with normal leaflet excursion. No tricuspid regurgitation is visualized.  Aortic Valve: The aortic valve is trileaflet. No evidence of aortic stenosis. Mild aortic valve regurgitation is seen.  Pulmonic Valve: Structurally normal pulmonic valve, with normal leaflet excursion. Trace pulmonic valve regurgitation.  Aorta: The aortic root is normal in size and structure. There is moderate aortic root calcification.  Venous: The inferior vena cava was normal sized, with respiratory size variation greater than 50%.       2D AND M-MODE MEASUREMENTS (normal ranges within parentheses):  Left                  Normal   Aorta/Left             Normal  Ventricle:                     Atrium:  IVSd (2D):  1.15 cm  (0.7-1.1) AoV Cusp       1.59  (1.5-2.6)  LVPWd (2D): 1.08 cm  (0.7-1.1) Separation:    cm  LVIDd (2D): 4.76 cm  (3.4-5.7) Left Atrium    3.54  (1.9-4.0)  LVIDs (2D): 3.58 cm            (Mmode):        cm  LV FS (2D):  24.8 %   (>25%)   LA Volume      28.8  Relative      0.45    (<0.42)  Index         ml/m²  Wall      Right  Thickness                      Ventricle:                                 RVd (2D):      3.14 cm    SPECTRAL DOPPLER ANALYSIS:  LV DIASTOLIC FUNCTION:  MV Peak E: 0.46 m/s Decel Time: 152 msec  MV Peak A: 0.76 m/s  E/A Ratio: 0.61    Aortic Valve:  AoV VMax:    1.55 m/s AoV Area, Vmax: 2.20 cm² Vmax Indx: 1.01 cm²/m²  AoV Pk Grad: 9.6 mmHg    LVOT Vmax: 0.99 m/s  LVOT VTI:  0.21 m  LVOT Diam: 2.09 cm    Aortic Insufficiency:  AI Half-time:  415 msec  AI Decel Rate: 2.57 m/s²    Mitral Valve:  MV P1/2 Time: 44.01 msec  MV Area, PHT: 5.00 cm²    Pulmonic Valve:  PV Max Velocity: 0.88 m/s PV Max PG: 3.1 mmHg PV Mean PG:     [id number] Bobby Duval M.D., Electronically signed on 2/9/2020 at 2:40:50 P    LABS:                        14.5   7.28  )-----------( 153      ( 14 Feb 2020 04:23 )             42.0     02-14    140  |  104  |  13  ----------------------------<  106<H>  4.1   |  23  |  1.0    Ca    8.8      14 Feb 2020 04:23  Phos  3.7     02-14  Mg     2.3     02-14    TPro  6.0  /  Alb  3.6  /  TBili  0.9  /  DBili  x   /  AST  33  /  ALT  29  /  AlkPhos  73  02-14    CARDIAC MARKERS ( 14 Feb 2020 04:23 )  x     / 3.41 ng/mL / 114 U/L / x     / 2.9 ng/mL  CARDIAC MARKERS ( 13 Feb 2020 11:32 )  x     / 3.28 ng/mL / x     / x     / x      CARDIAC MARKERS ( 13 Feb 2020 04:44 )  x     / 3.66 ng/mL / x     / x     / x        Magnesium, Serum: 2.3 mg/dL [1.8 - 2.4] (02-14-20 @ 04:23)  LIVER FUNCTIONS - ( 14 Feb 2020 04:23 )  Alb: 3.6 g/dL / Pro: 6.0 g/dL / ALK PHOS: 73 U/L / ALT: 29 U/L / AST: 33 U/L / GGT: x           A/P:  I discussed the case with Cardiologist Dr. Rouse and recommend the following:    S/P PCI dRCA NILSON x 1/ 2V CAD (RCA and LAD)/STEMI                                 f/u CTS recommendations                    Monitor access site                   Continue DAPT ( Aspirin 81 mg daily and Plavix 75 mg daily ) BB, Statin Therapy                   no ACE due low b/p, reevaluate when SBP > 120                    keep K = 4, Mg = 2                   daily 12 lead EKG                   Ambulate patient around the unit                    DVT / GI prophylaxis                    Patient given 30 day supply of ( Aspirin 81 mg daily and Plavix 75 mg daily ) to take at home                   Patient agreeing to take DAPT for at least one year or as directed by cardiologist                    Pt given instructions on importance of taking antiplatelet medication or risk acute stent thrombosis/death                   Post cath instructions, access site care and activity restrictions reviewed with patient                     Discussed with patient to return to hospital if experience chest pain, shortness breath, dizziness and site bleeding                   Aggressive risk factor modification, diet counseling, smoking cessation discussed with patient                     f/u with Dr. Rouse regarding patient disposition

## 2020-02-14 NOTE — PROGRESS NOTE ADULT - ASSESSMENT
84yo male traveling from [place] presented to ER with chill. In the ER found to have rapid atrial fibrillation .    #Inferior wall STEMI  - S/p cardiac cath on 2/9/20: LM disease, 2V disease (RCA and LAD) S/p PCI of the RCA  - S/p cardiac cath on 2/12/20: sig LM, LAD and RPDA disease  -  Transthoracic Echocardiogram (02.09.20 @ 07:47) >Left ventricular ejection fraction, by visual estimation, is 50 to 55%.Grade I diastolic dysfunction).  - C/w ASA, plavix, statin, metoprolol  - preop work up for CABG completed  - CT surgery F/u     # Paroxysmal afib, now in NSR  - c/w metoprolol    # Metabolic encephalopathy - resolved  - CT Head No Cont (02.11.20 @ 23:23) >No evidence of acute intracranial abnormality.    # DVT prophylaxis    # Full code    #Pending : CT surg F/u  # Discussed plan of care with patient  # Disposition: Home when stable

## 2020-02-14 NOTE — PHYSICAL THERAPY INITIAL EVALUATION ADULT - GENERAL OBSERVATIONS, REHAB EVAL
10:30-10:50. chart reviewed. Pt received sitting at B/S chair, alert, oriented, able to follow multi-step instructions son At B/s and agreeable to PT evaluation. + monitoring + IV, denies pain or discomfort NAD. Pt is independent with overall functional mobiloity, able to ambulate for 200 ft without AD. Pt is not candidate for PT at this time. Further PT referral PRN.

## 2020-02-15 ENCOUNTER — TRANSCRIPTION ENCOUNTER (OUTPATIENT)
Age: 85
End: 2020-02-15

## 2020-02-15 VITALS
TEMPERATURE: 97 F | SYSTOLIC BLOOD PRESSURE: 104 MMHG | HEART RATE: 63 BPM | DIASTOLIC BLOOD PRESSURE: 55 MMHG | RESPIRATION RATE: 18 BRPM

## 2020-02-15 LAB
ALBUMIN SERPL ELPH-MCNC: 4 G/DL — SIGNIFICANT CHANGE UP (ref 3.5–5.2)
ALP SERPL-CCNC: 79 U/L — SIGNIFICANT CHANGE UP (ref 30–115)
ALT FLD-CCNC: 35 U/L — SIGNIFICANT CHANGE UP (ref 0–41)
ANION GAP SERPL CALC-SCNC: 13 MMOL/L — SIGNIFICANT CHANGE UP (ref 7–14)
AST SERPL-CCNC: 35 U/L — SIGNIFICANT CHANGE UP (ref 0–41)
BASOPHILS # BLD AUTO: 0.03 K/UL — SIGNIFICANT CHANGE UP (ref 0–0.2)
BASOPHILS NFR BLD AUTO: 0.5 % — SIGNIFICANT CHANGE UP (ref 0–1)
BILIRUB SERPL-MCNC: 0.9 MG/DL — SIGNIFICANT CHANGE UP (ref 0.2–1.2)
BUN SERPL-MCNC: 15 MG/DL — SIGNIFICANT CHANGE UP (ref 10–20)
CALCIUM SERPL-MCNC: 9.1 MG/DL — SIGNIFICANT CHANGE UP (ref 8.5–10.1)
CHLORIDE SERPL-SCNC: 102 MMOL/L — SIGNIFICANT CHANGE UP (ref 98–110)
CO2 SERPL-SCNC: 26 MMOL/L — SIGNIFICANT CHANGE UP (ref 17–32)
CREAT SERPL-MCNC: 1 MG/DL — SIGNIFICANT CHANGE UP (ref 0.7–1.5)
EOSINOPHIL # BLD AUTO: 0.22 K/UL — SIGNIFICANT CHANGE UP (ref 0–0.7)
EOSINOPHIL NFR BLD AUTO: 3.4 % — SIGNIFICANT CHANGE UP (ref 0–8)
GLUCOSE SERPL-MCNC: 101 MG/DL — HIGH (ref 70–99)
HCT VFR BLD CALC: 44.7 % — SIGNIFICANT CHANGE UP (ref 42–52)
HGB BLD-MCNC: 14.6 G/DL — SIGNIFICANT CHANGE UP (ref 14–18)
IMM GRANULOCYTES NFR BLD AUTO: 0.5 % — HIGH (ref 0.1–0.3)
LYMPHOCYTES # BLD AUTO: 1.75 K/UL — SIGNIFICANT CHANGE UP (ref 1.2–3.4)
LYMPHOCYTES # BLD AUTO: 27.3 % — SIGNIFICANT CHANGE UP (ref 20.5–51.1)
MAGNESIUM SERPL-MCNC: 2.5 MG/DL — HIGH (ref 1.8–2.4)
MCHC RBC-ENTMCNC: 32 PG — HIGH (ref 27–31)
MCHC RBC-ENTMCNC: 32.7 G/DL — SIGNIFICANT CHANGE UP (ref 32–37)
MCV RBC AUTO: 98 FL — HIGH (ref 80–94)
MONOCYTES # BLD AUTO: 0.59 K/UL — SIGNIFICANT CHANGE UP (ref 0.1–0.6)
MONOCYTES NFR BLD AUTO: 9.2 % — SIGNIFICANT CHANGE UP (ref 1.7–9.3)
NEUTROPHILS # BLD AUTO: 3.8 K/UL — SIGNIFICANT CHANGE UP (ref 1.4–6.5)
NEUTROPHILS NFR BLD AUTO: 59.1 % — SIGNIFICANT CHANGE UP (ref 42.2–75.2)
NRBC # BLD: 0 /100 WBCS — SIGNIFICANT CHANGE UP (ref 0–0)
PLATELET # BLD AUTO: 188 K/UL — SIGNIFICANT CHANGE UP (ref 130–400)
POTASSIUM SERPL-MCNC: 4.1 MMOL/L — SIGNIFICANT CHANGE UP (ref 3.5–5)
POTASSIUM SERPL-SCNC: 4.1 MMOL/L — SIGNIFICANT CHANGE UP (ref 3.5–5)
PROT SERPL-MCNC: 6.6 G/DL — SIGNIFICANT CHANGE UP (ref 6–8)
RBC # BLD: 4.56 M/UL — LOW (ref 4.7–6.1)
RBC # FLD: 12.4 % — SIGNIFICANT CHANGE UP (ref 11.5–14.5)
SODIUM SERPL-SCNC: 141 MMOL/L — SIGNIFICANT CHANGE UP (ref 135–146)
WBC # BLD: 6.42 K/UL — SIGNIFICANT CHANGE UP (ref 4.8–10.8)
WBC # FLD AUTO: 6.42 K/UL — SIGNIFICANT CHANGE UP (ref 4.8–10.8)

## 2020-02-15 PROCEDURE — 93010 ELECTROCARDIOGRAM REPORT: CPT

## 2020-02-15 PROCEDURE — 99231 SBSQ HOSP IP/OBS SF/LOW 25: CPT

## 2020-02-15 PROCEDURE — 99239 HOSP IP/OBS DSCHRG MGMT >30: CPT

## 2020-02-15 RX ORDER — ASPIRIN/CALCIUM CARB/MAGNESIUM 324 MG
1 TABLET ORAL
Qty: 30 | Refills: 0
Start: 2020-02-15 | End: 2020-03-15

## 2020-02-15 RX ORDER — ASPIRIN/CALCIUM CARB/MAGNESIUM 324 MG
1 TABLET ORAL
Qty: 30 | Refills: 0
Start: 2020-02-15 | End: 2020-04-22

## 2020-02-15 RX ORDER — CLOPIDOGREL BISULFATE 75 MG/1
1 TABLET, FILM COATED ORAL
Qty: 30 | Refills: 0
Start: 2020-02-15 | End: 2020-03-15

## 2020-02-15 RX ORDER — ATORVASTATIN CALCIUM 80 MG/1
1 TABLET, FILM COATED ORAL
Qty: 30 | Refills: 0
Start: 2020-02-15 | End: 2020-04-22

## 2020-02-15 RX ORDER — LISINOPRIL 2.5 MG/1
1 TABLET ORAL
Qty: 30 | Refills: 0
Start: 2020-02-15 | End: 2020-03-15

## 2020-02-15 RX ORDER — METOPROLOL TARTRATE 50 MG
1 TABLET ORAL
Qty: 30 | Refills: 0
Start: 2020-02-15 | End: 2020-04-22

## 2020-02-15 RX ORDER — CLOPIDOGREL BISULFATE 75 MG/1
1 TABLET, FILM COATED ORAL
Qty: 30 | Refills: 0
Start: 2020-02-15 | End: 2020-04-22

## 2020-02-15 RX ORDER — ATORVASTATIN CALCIUM 80 MG/1
1 TABLET, FILM COATED ORAL
Qty: 30 | Refills: 0
Start: 2020-02-15 | End: 2020-03-15

## 2020-02-15 RX ORDER — METOPROLOL TARTRATE 50 MG
1 TABLET ORAL
Qty: 30 | Refills: 0
Start: 2020-02-15 | End: 2020-03-15

## 2020-02-15 RX ADMIN — PANTOPRAZOLE SODIUM 40 MILLIGRAM(S): 20 TABLET, DELAYED RELEASE ORAL at 05:48

## 2020-02-15 RX ADMIN — Medication 12.5 MILLIGRAM(S): at 05:47

## 2020-02-15 RX ADMIN — ENOXAPARIN SODIUM 40 MILLIGRAM(S): 100 INJECTION SUBCUTANEOUS at 11:49

## 2020-02-15 RX ADMIN — CHLORHEXIDINE GLUCONATE 1 APPLICATION(S): 213 SOLUTION TOPICAL at 05:47

## 2020-02-15 RX ADMIN — CLOPIDOGREL BISULFATE 75 MILLIGRAM(S): 75 TABLET, FILM COATED ORAL at 11:49

## 2020-02-15 RX ADMIN — Medication 81 MILLIGRAM(S): at 11:49

## 2020-02-15 RX ADMIN — LISINOPRIL 2.5 MILLIGRAM(S): 2.5 TABLET ORAL at 05:48

## 2020-02-15 NOTE — DISCHARGE NOTE PROVIDER - CARE PROVIDER_API CALL
Florence Rouse)  Cardiology; Interventional Cardiology  [address] [place] [address]  Phone: [phone]  Fax: [phone]  Follow Up Time: 1 week    Prateek Garcia)  Surgery; Thoracic and Cardiac Surgery  [address] [place] [address], [address]  [place] [address]  Phone: [phone]  Fax: [phone]  Follow Up Time: 2 weeks

## 2020-02-15 NOTE — DISCHARGE NOTE PROVIDER - PROVIDER TOKENS
PROVIDER:[TOKEN:[[id number]],FOLLOWUP:[1 week]],PROVIDER:[TOKEN:[[id number]],FOLLOWUP:[2 weeks]]

## 2020-02-15 NOTE — PROGRESS NOTE ADULT - SUBJECTIVE AND OBJECTIVE BOX
Patient is a 85y old  Male who presents with a chief complaint of s/p STEMI, 3V CAD  s/p PCI dRCA NILSON (14 Feb 2020 11:37)      OVERNIGHT EVENTS: remained stable with no chest pain    SUBJECTIVE / INTERVAL HPI: Patient seen and examined at bedside.     VITAL SIGNS:  Vital Signs Last 24 Hrs  T(C): 36.7 (15 Feb 2020 06:32), Max: 36.7 (15 Feb 2020 06:32)  T(F): 98 (15 Feb 2020 06:32), Max: 98 (15 Feb 2020 06:32)  HR: 72 (15 Feb 2020 06:32) (72 - 80)  BP: 111/58 (15 Feb 2020 06:32) (99/59 - 122/63)  BP(mean): 82 (14 Feb 2020 16:00) (76 - 82)  RR: 18 (15 Feb 2020 06:32) (16 - 251)  SpO2: 97% (15 Feb 2020 06:32) (94% - 98%)    PHYSICAL EXAM:    General: WDWN  HEENT: NC/AT; PERRL, clear conjunctiva  Neck: supple  Cardiovascular: +S1/S2; RRR  Respiratory: CTA b/l; no W/R/R  Gastrointestinal: soft, NT/ND; +BSx4  Extremities: WWP; 2+ peripheral pulses; no edema   Neurological: AAOx3; no focal deficits    MEDICATIONS:  MEDICATIONS  (STANDING):  aspirin  chewable 81 milliGRAM(s) Oral daily  atorvastatin 80 milliGRAM(s) Oral at bedtime  chlorhexidine 4% Liquid 1 Application(s) Topical <User Schedule>  clopidogrel Tablet 75 milliGRAM(s) Oral daily  enoxaparin Injectable 40 milliGRAM(s) SubCutaneous daily  lisinopril 2.5 milliGRAM(s) Oral daily  metoprolol tartrate 12.5 milliGRAM(s) Oral two times a day  pantoprazole    Tablet 40 milliGRAM(s) Oral before breakfast    MEDICATIONS  (PRN):      ALLERGIES:  Allergies    No Known Allergies    Intolerances        LABS:                        14.6   6.42  )-----------( 188      ( 15 Feb 2020 07:59 )             44.7     02-15    141  |  102  |  15  ----------------------------<  101<H>  4.1   |  26  |  1.0    Ca    9.1      15 Feb 2020 07:59  Phos  3.7     02-14  Mg     2.5     02-15    TPro  6.6  /  Alb  4.0  /  TBili  0.9  /  DBili  x   /  AST  35  /  ALT  35  /  AlkPhos  79  02-15      < from: CT Chest No Cont (02.12.20 @ 21:24) >  IMPRESSION:    Cardiomegaly with trace pericardial effusion. Coronary artery disease, severe at the LAD.    Ascending aorta caliber at the upper limit of normal (4.0 cm). Calcified aortic valve. The ascending aorta is free of calcified plaque. Atherosclerotic calcifications are noted at the aortic arch and descending thoracic aorta.    Bilateral calcified pleural plaques.  Right basilar consolidation/atelectasis.    Right renal cyst. Punctate hyperdensities within the bilateral renal collecting system, which may represent recent intervention with contrast material versus punctate nonobstructing renal calculi. Correlate clinically.    < end of copied text >  < from: Transthoracic Echocardiogram (02.09.20 @ 07:47) >  PHYSICIAN INTERPRETATION:  Left Ventricle: The left ventricular internal cavity size is normal. Left ventricular wall thickness is normal. Left ventricular ejection fraction, by visual estimation, is 50 to 55%. Spectral Doppler shows impaired relaxation pattern of left ventricular myocardial filling (Grade I diastolic dysfunction).  Right Ventricle: The right ventricular size is mildly enlarged. RV systolic function is mildly reduced.  Left Atrium: Mildly enlarged left atrium.  Right Atrium: Mildly enlarged right atrium.  Mitral Valve: Structurally normal mitral valve, with normal leaflet excursion. No evidence of mitral valve stenosis. Mild mitral valve regurgitation is seen.  Tricuspid Valve: Structurally normal tricuspid valve, with normal leaflet excursion. No tricuspid regurgitation is visualized.  Aortic Valve: The aortic valve is trileaflet. No evidence of aortic stenosis. Mild aortic valve regurgitation is seen.  Pulmonic Valve: Structurally normal pulmonic valve, with normal leaflet excursion. Trace pulmonic valve regurgitation.  Aorta: The aortic root is normal in size and structure. There is moderate aortic root calcification.  Venous: The inferior vena cava was normal sized, with respiratory size variation greater than 50%.    < end of copied text >  < from: VA Duplex Carotid, Bilat (02.12.20 @ 17:47) >  Impression:    20-39% stenosis of the right internal carotid artery.    20-39% stenosis of the left internal carotid artery.    < end of copied text >

## 2020-02-15 NOTE — PROGRESS NOTE ADULT - ASSESSMENT
86yo male traveling from [place] presented to ER with chill. In the ER found to have rapid atrial fibrillation .    #Inferior wall STEMI s/p Stent    - S/p cardiac cath with 3 vessels LM, LAD and RPDA disease  - C/w ASA, plavix, statin, metoprolol and ACEI  - preop work up for CABG completed  - CT surgery F/u,     # Paroxysmal afib, now in NSR  - c/w metoprolol  - no AC fo now as thought to be transient and induced by STEMI,     # Metabolic encephalopathy - resolved  - normal HCT    # DVT prophylaxis: Lovenox     # Full code    # Disposition: Home after CT recommendations 86yo male traveling from [place] presented to ER with chill. In the ER found to have rapid atrial fibrillation .    #Inferior wall STEMI s/p Stent    - S/p cardiac cath with 3 vessels LM, LAD and RPDA disease  - C/w ASA, plavix, statin, metoprolol and ACEI  - preop work up for CABG completed  - CT surgery recommended outpatient follow up for possible CABG.     # Paroxysmal afib, now in NSR  - c/w metoprolol  Patient presented with Atrial fibrillation. However cardiology didn't comment on anticoagulation. He needs anticoagulation if is ok by cardiology. I try to reach Dr. Rouse on his cell phone, no answer. Outpatient follow up with his cardiologist.  (attending addendum).     # Metabolic encephalopathy - resolved  - normal HCT    # DVT prophylaxis: Lovenox     # Full code    # Disposition: Home after CT recommendations

## 2020-02-15 NOTE — DISCHARGE NOTE PROVIDER - NSDCCPCAREPLAN_GEN_ALL_CORE_FT
PRINCIPAL DISCHARGE DIAGNOSIS  Diagnosis: 3-vessel CAD  Assessment and Plan of Treatment: you were tretaed for heart attack, and underwent stent, further evaluation showed occlusion to 3 vessels that need open heart surgery, follow up with cardiotheracic surgery and cardiology clinic and take your medications as instructed      SECONDARY DISCHARGE DIAGNOSES  Diagnosis: Afib  Assessment and Plan of Treatment: you had abnormal heart rhythm that resolved now, please follow up with your cardiology at his clinic if you need to be on blood thinner or not

## 2020-02-15 NOTE — DISCHARGE NOTE PROVIDER - NSDCMRMEDTOKEN_GEN_ALL_CORE_FT
aspirin 81 mg oral tablet, chewable: 1 tab(s) orally once a day  atorvastatin 80 mg oral tablet: 1 tab(s) orally once a day (at bedtime)  clopidogrel 75 mg oral tablet: 1 tab(s) orally once a day  lisinopril 2.5 mg oral tablet: 1 tab(s) orally once a day  metoprolol succinate 25 mg oral capsule, extended release: 1 cap(s) orally once a day

## 2020-02-15 NOTE — DISCHARGE NOTE PROVIDER - HOSPITAL COURSE
q 85 year old male with no PMH and no cardiac risk factors presented to [facility] on 2/9 for cough and shortness of breath. Initially diagnosed with community-acquired pneumonia and started on azithromycin and rocephin. Patient was also in newly-discovered AFib with RVR on presentation. Overnight on the first night of admission, repeat EKG was done which demonstrated NSR with ST-segment elevations in inferior leads. Patient was loaded with aspirin and plavix and transferred to [facility] for further management.        Upon arrival to [facility], patient underwent emergent LHC which demonstrated left main 40% lesion, proximal LAD aneurysm, mid LAD 80% stenosis, and RPDA thrombus/significant stenosis 9%. A stent was placed in RCA lesion and patient was planned for staged PCI for LM/LAD lesion. Patient was started on DAPT, low dose metoprolol, and lipitor 80mg. TTE demonstrated normal EF, grade 1 diastolic dysfunction, and mild MR/AR. LDL was 75, A1c 5.8%. Patient underwent repeat catheterization on 2/12 which demonstrated LM, LAD, and RPDA lesions; no stents were placed. Patient subsequently cleared for discharge from cardiology perspective. pt had preop evaluation by CT surgery and follow up with them as outpt for elective CABG

## 2020-02-15 NOTE — DISCHARGE NOTE NURSING/CASE MANAGEMENT/SOCIAL WORK - PATIENT PORTAL LINK FT
You can access the FollowMyHealth Patient Portal offered by [facility] by registering at the following website: [email or web address]. By joining [facility] [employer]’s FollowMyHealth portal, you will also be able to view your health information using other applications (apps) compatible with our system.

## 2020-02-15 NOTE — PROGRESS NOTE ADULT - SUBJECTIVE AND OBJECTIVE BOX
As per Dr. Garcia the patient will follow-up with him in 2 weeks as an outpatient for elective CABG.  No surgical intervention during this admission.  OK to d/c home when cleared by medicine.

## 2020-02-18 PROBLEM — Z00.00 ENCOUNTER FOR PREVENTIVE HEALTH EXAMINATION: Status: ACTIVE | Noted: 2020-02-18

## 2020-02-20 ENCOUNTER — EMERGENCY (EMERGENCY)
Facility: HOSPITAL | Age: 85
LOS: 0 days | Discharge: HOME | End: 2020-02-20
Attending: EMERGENCY MEDICINE | Admitting: EMERGENCY MEDICINE
Payer: MEDICARE

## 2020-02-20 VITALS
RESPIRATION RATE: 18 BRPM | HEART RATE: 85 BPM | OXYGEN SATURATION: 97 % | SYSTOLIC BLOOD PRESSURE: 101 MMHG | DIASTOLIC BLOOD PRESSURE: 77 MMHG | TEMPERATURE: 98 F

## 2020-02-20 VITALS
SYSTOLIC BLOOD PRESSURE: 134 MMHG | DIASTOLIC BLOOD PRESSURE: 68 MMHG | WEIGHT: 207.01 LBS | OXYGEN SATURATION: 95 % | HEIGHT: 72 IN | HEART RATE: 75 BPM | TEMPERATURE: 97 F | RESPIRATION RATE: 19 BRPM

## 2020-02-20 DIAGNOSIS — S00.83XA CONTUSION OF OTHER PART OF HEAD, INITIAL ENCOUNTER: ICD-10-CM
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
## 2020-02-20 DIAGNOSIS — S01.81XA LACERATION WITHOUT FOREIGN BODY OF OTHER PART OF HEAD, INITIAL ENCOUNTER: ICD-10-CM
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
## 2020-02-20 DIAGNOSIS — S60.512A ABRASION OF LEFT HAND, INITIAL ENCOUNTER: ICD-10-CM

## 2020-02-20 DIAGNOSIS — S63.283A DISLOCATION OF PROXIMAL INTERPHALANGEAL JOINT OF LEFT MIDDLE FINGER, INITIAL ENCOUNTER: ICD-10-CM

## 2020-02-20 DIAGNOSIS — Z95.5 PRESENCE OF CORONARY ANGIOPLASTY IMPLANT AND GRAFT: ICD-10-CM

## 2020-02-20 DIAGNOSIS — S60.511A ABRASION OF RIGHT HAND, INITIAL ENCOUNTER: ICD-10-CM
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
## 2020-02-20 DIAGNOSIS — S09.90XA UNSPECIFIED INJURY OF HEAD, INITIAL ENCOUNTER: ICD-10-CM

## 2020-02-20 DIAGNOSIS — Z98.890 OTHER SPECIFIED POSTPROCEDURAL STATES: Chronic | ICD-10-CM

## 2020-02-20 DIAGNOSIS — Z95.5 PRESENCE OF CORONARY ANGIOPLASTY IMPLANT AND GRAFT: Chronic | ICD-10-CM

## 2020-02-20 DIAGNOSIS — Z87.891 PERSONAL HISTORY OF NICOTINE DEPENDENCE: ICD-10-CM

## 2020-02-20 DIAGNOSIS — S62.613A DISPLACED FRACTURE OF PROXIMAL PHALANX OF LEFT MIDDLE FINGER, INITIAL ENCOUNTER FOR CLOSED FRACTURE: ICD-10-CM

## 2020-02-20 PROCEDURE — 99284 EMERGENCY DEPT VISIT MOD MDM: CPT | Mod: 57

## 2020-02-20 PROCEDURE — 73130 X-RAY EXAM OF HAND: CPT | Mod: 26,LT,76

## 2020-02-20 PROCEDURE — 12013 RPR F/E/E/N/L/M 2.6-5.0 CM: CPT | Mod: 59

## 2020-02-20 PROCEDURE — 70450 CT HEAD/BRAIN W/O DYE: CPT | Mod: 26

## 2020-02-20 PROCEDURE — 26770 TREAT FINGER DISLOCATION: CPT | Mod: 54

## 2020-02-20 RX ORDER — ACETAMINOPHEN 500 MG
650 TABLET ORAL ONCE
Refills: 0 | Status: COMPLETED | OUTPATIENT
Start: 2020-02-20 | End: 2020-02-20

## 2020-02-20 RX ADMIN — Medication 650 MILLIGRAM(S): at 17:09

## 2020-02-20 NOTE — ED PROVIDER NOTE - SECONDARY DIAGNOSIS.
Fracture dislocation of finger Laceration of chin CHI (closed head injury), initial encounter Contusion of face

## 2020-02-20 NOTE — ED PROVIDER NOTE - OBJECTIVE STATEMENT
86 y/o male on anticoagulation presents s/p mechanical fall pta. patient sustained laceration to chin, right cheek. patient c/o left 3rd digit deformity with bruising. patient denies any visual changes, neck or back pain. patient able to ambulate after injury. patient denies any dental injury. patinent wtihout epistaxis. patient denies any headache, dizziness, weakness. no LOC.

## 2020-02-20 NOTE — ED PROVIDER NOTE - SKIN, MLM
+large laceration to chin jagged 3.0 cm , no foreign body. +surrounidng abrasion, +tenderness and swelling laceation to right infra orbital region. no bleeding

## 2020-02-20 NOTE — ED PROVIDER NOTE - CARE PLAN
Principal Discharge DX:	Fall, initial encounter  Secondary Diagnosis:	Fracture dislocation of finger  Secondary Diagnosis:	Laceration of chin  Secondary Diagnosis:	CHI (closed head injury), initial encounter Principal Discharge DX:	Fall, initial encounter  Secondary Diagnosis:	Fracture dislocation of finger  Secondary Diagnosis:	Laceration of chin  Secondary Diagnosis:	CHI (closed head injury), initial encounter  Secondary Diagnosis:	Contusion of face

## 2020-02-20 NOTE — ED PROVIDER NOTE - CARE PROVIDER_API CALL
Krishna Yen)  Orthopaedic Surgery  [address] [place] [address]  Phone: [phone]  Fax: [phone]  Follow Up Time:

## 2020-02-20 NOTE — ED PROVIDER NOTE - EYES, MLM
Clear bilaterally, pupils equal, round and reactive to light. EOMI, +bruising to right infraorbit. no crepitation

## 2020-02-20 NOTE — ED PROVIDER NOTE - NSFOLLOWUPINSTRUCTIONS_ED_ALL_ED_FT
Closed Head Injury    A closed head injury is an injury to your head that may or may not involve a traumatic brain injury (TBI).  A CT scan of the head may not have been performed because they are usually normal after a concussion. Concussions are diagnosed and managed based on the history given and the symptoms experienced after the head injury. Most concussions do not cause serious problem and get better over several days.  Symptoms of TBI can be short or long lasting and include headache, dizziness, interference with memory or speech, fatigue, confusion, changes in sleep, mood changes, nausea, depression/anxiety, and dulling of senses. Make sure to obtain proper rest which includes getting plenty of sleep, avoiding excessive visual stimulation, and avoiding activities that may cause physical or mental stress. Avoid any situation where there is potential for another head injury, including sports.    SEEK IMMEDIATE MEDICAL CARE IF YOU HAVE ANY OF THE FOLLOWING SYMPTOMS: unusual drowsiness, vomiting, severe dizziness, seizures, lightheadedness, muscular weakness, different pupil sizes, visual changes, or clear or bloody discharge from your ears or nose.      Laceration    A laceration is a cut that goes through all of the layers of the skin and into the tissue that is right under the skin. Some lacerations heal on their own. Others need to be closed with stitches (sutures), staples, skin adhesive strips, or skin glue. Proper laceration care minimizes the risk of infection and helps the laceration to heal better.     SEEK IMMEDIATE MEDICAL CARE IF YOU HAVE THE FOLLOWING SYMPTOMS: swelling around the wound, worsening pain, drainage from the wound, red streaking going away from your wound, inability to move finger or toe near the laceration, or discoloration of skin near the laceration.      suture removal in 7 days    Fracture    A fracture is a break in one of your bones. This can occur from a variety of injuries, especially traumatic ones. Symptoms include pain, bruising, or swelling. Do not use the injured limb. If a fracture is in one of the bones below your waist, do not put weight on that limb unless instructed to do so by your healthcare provider. Crutches or a cane may have been provided. A splint or cast may have been applied by your health care provider. Make sure to keep it dry and follow up with an orthopedist as instructed.    SEEK IMMEDIATE MEDICAL CARE IF YOU HAVE ANY OF THE FOLLOWING SYMPTOMS: numbness, tingling, increasing pain, or weakness in any part of the injured limb.

## 2020-02-20 NOTE — ED PROVIDER NOTE - CONSTITUTIONAL, MLM
normal... Well appearing, awake, alert, oriented to person, place, time/situation and elderly andfrail

## 2020-02-20 NOTE — ED PROVIDER NOTE - CLINICAL SUMMARY MEDICAL DECISION MAKING FREE TEXT BOX
wound care and repair.  Finger reduced and splinted.  Fracture noted,  Results d/w patient and son.  They are instructed on proper wound care.  Will f/u with Ortho, PMD and return to ED for suture removal,

## 2020-02-20 NOTE — ED PROVIDER NOTE - ATTENDING CONTRIBUTION TO CARE
86 yo M recent stent placement presents with son s/p trip and fall on last step.  Pt sustained laceration to face and c/o pain to left middle finger.  no neck or back pain, Tetanus UTD.  On exam pt in NAD AAO x 3, GCS 15, + swelling with bruising and abrasion to riht cheek, + tenderness, no crepitus,  + laceration to chin with abrasion, PERRL, EOMI, no hemotympanum, no midline vertebral tenderness, no step off, abd soft nt nd, hips non tender, + swelling with deformity to left middle finger,   + abrasions to both hands

## 2020-02-20 NOTE — ED PROVIDER NOTE - MUSCULOSKELETAL, MLM
Spine appears normal, gait steady, left 3rd digit +deformity at PIP, with swelling and abrasion , good cap refill

## 2020-02-20 NOTE — ED PROVIDER NOTE - NS ED ROS FT
Review of Systems    Constitutional: (-) fever/ chills   Eyes/ENT: (-) blurry vision, (-) epistaxis (-) sore throat no dental injury  Cardiovascular: (-) chest pain, (-) syncope   Respiratory: (-) cough, (-) shortness of breath  Gastrointestinal: (-) abdominal pain  Musculoskeletal: (-) neck pain, (-) back pain, (+)left 3rd digit joint pain   Integumentary: (+lacerations to chin and bruising to cheek  Neurological: (-) headache,

## 2020-02-20 NOTE — ED PROVIDER NOTE - PATIENT PORTAL LINK FT
You can access the FollowMyHealth Patient Portal offered by [facility] by registering at the following website: [email or web address]. By joining [email or web address] FollowMyHealth portal, you will also be able to view your health information using other applications (apps) compatible with our system.

## 2020-02-20 NOTE — ED PROVIDER NOTE - CARE PROVIDERS DIRECT ADDRESSES
Cataract(s) are not yet visually significant to the patient. Recommend monitoring, and patient agrees with this plan. ,[email or web address]

## 2020-02-21 DIAGNOSIS — I95.9 HYPOTENSION, UNSPECIFIED: ICD-10-CM

## 2020-02-21 DIAGNOSIS — I21.11 ST ELEVATION (STEMI) MYOCARDIAL INFARCTION INVOLVING RIGHT CORONARY ARTERY: ICD-10-CM

## 2020-02-21 DIAGNOSIS — I50.9 HEART FAILURE, UNSPECIFIED: ICD-10-CM

## 2020-02-21 DIAGNOSIS — G93.41 METABOLIC ENCEPHALOPATHY: ICD-10-CM

## 2020-02-21 DIAGNOSIS — R26.9 UNSPECIFIED ABNORMALITIES OF GAIT AND MOBILITY: ICD-10-CM

## 2020-02-21 DIAGNOSIS — J40 BRONCHITIS, NOT SPECIFIED AS ACUTE OR CHRONIC: ICD-10-CM

## 2020-02-21 DIAGNOSIS — Z85.07 PERSONAL HISTORY OF MALIGNANT NEOPLASM OF PANCREAS: ICD-10-CM

## 2020-02-21 DIAGNOSIS — I25.84 CORONARY ATHEROSCLEROSIS DUE TO CALCIFIED CORONARY LESION: ICD-10-CM

## 2020-02-21 DIAGNOSIS — Z79.82 LONG TERM (CURRENT) USE OF ASPIRIN: ICD-10-CM

## 2020-02-21 DIAGNOSIS — I35.1 NONRHEUMATIC AORTIC (VALVE) INSUFFICIENCY: ICD-10-CM

## 2020-02-21 DIAGNOSIS — Z79.02 LONG TERM (CURRENT) USE OF ANTITHROMBOTICS/ANTIPLATELETS: ICD-10-CM

## 2020-02-21 DIAGNOSIS — I25.10 ATHEROSCLEROTIC HEART DISEASE OF NATIVE CORONARY ARTERY WITHOUT ANGINA PECTORIS: ICD-10-CM

## 2020-02-21 DIAGNOSIS — I48.0 PAROXYSMAL ATRIAL FIBRILLATION: ICD-10-CM

## 2020-02-21 DIAGNOSIS — Z85.05 PERSONAL HISTORY OF MALIGNANT NEOPLASM OF LIVER: ICD-10-CM

## 2020-02-21 DIAGNOSIS — E87.6 HYPOKALEMIA: ICD-10-CM

## 2020-02-21 DIAGNOSIS — I34.0 NONRHEUMATIC MITRAL (VALVE) INSUFFICIENCY: ICD-10-CM

## 2020-02-21 DIAGNOSIS — I47.2 VENTRICULAR TACHYCARDIA: ICD-10-CM

## 2020-02-25 ENCOUNTER — APPOINTMENT (OUTPATIENT)
Dept: CARDIOTHORACIC SURGERY | Facility: CLINIC | Age: 85
End: 2020-02-25
Payer: MEDICARE

## 2020-02-25 VITALS — DIASTOLIC BLOOD PRESSURE: 68 MMHG | SYSTOLIC BLOOD PRESSURE: 130 MMHG

## 2020-02-25 VITALS
TEMPERATURE: 97.6 F | WEIGHT: 197 LBS | OXYGEN SATURATION: 96 % | HEIGHT: 68 IN | RESPIRATION RATE: 12 BRPM | HEART RATE: 61 BPM | BODY MASS INDEX: 29.86 KG/M2 | SYSTOLIC BLOOD PRESSURE: 144 MMHG | DIASTOLIC BLOOD PRESSURE: 77 MMHG

## 2020-02-25 DIAGNOSIS — E78.5 HYPERLIPIDEMIA, UNSPECIFIED: ICD-10-CM

## 2020-02-25 DIAGNOSIS — I25.10 ATHEROSCLEROTIC HEART DISEASE OF NATIVE CORONARY ARTERY W/OUT ANGINA PECTORIS: ICD-10-CM

## 2020-02-25 PROCEDURE — 99202 OFFICE O/P NEW SF 15 MIN: CPT

## 2020-02-25 RX ORDER — ATORVASTATIN CALCIUM 80 MG/1
80 TABLET, FILM COATED ORAL
Refills: 0 | Status: ACTIVE | COMMUNITY

## 2020-02-27 ENCOUNTER — EMERGENCY (EMERGENCY)
Facility: HOSPITAL | Age: 85
LOS: 0 days | Discharge: HOME | End: 2020-02-27
Attending: EMERGENCY MEDICINE | Admitting: EMERGENCY MEDICINE
Payer: MEDICARE

## 2020-02-27 VITALS
DIASTOLIC BLOOD PRESSURE: 71 MMHG | OXYGEN SATURATION: 99 % | HEART RATE: 65 BPM | SYSTOLIC BLOOD PRESSURE: 121 MMHG | TEMPERATURE: 97 F | RESPIRATION RATE: 18 BRPM

## 2020-02-27 VITALS — HEIGHT: 72 IN | WEIGHT: 197.09 LBS

## 2020-02-27 DIAGNOSIS — Z98.890 OTHER SPECIFIED POSTPROCEDURAL STATES: Chronic | ICD-10-CM

## 2020-02-27 DIAGNOSIS — S01.81XD LACERATION WITHOUT FOREIGN BODY OF OTHER PART OF HEAD, SUBSEQUENT ENCOUNTER: ICD-10-CM

## 2020-02-27 DIAGNOSIS — Z48.02 ENCOUNTER FOR REMOVAL OF SUTURES: ICD-10-CM
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
## 2020-02-27 DIAGNOSIS — Z95.5 PRESENCE OF CORONARY ANGIOPLASTY IMPLANT AND GRAFT: Chronic | ICD-10-CM

## 2020-02-27 PROCEDURE — L9995: CPT

## 2020-02-27 NOTE — ED PROVIDER NOTE - OBJECTIVE STATEMENT
85y M presents for suture removal. Suffered laceration to chin 7 days ago and returns today for removal. Denies fever, bleeding, purulence, rash, or dehiscence.

## 2020-02-27 NOTE — ED PROVIDER NOTE - PATIENT PORTAL LINK FT
You can access the FollowMyHealth Patient Portal offered by [facility] by registering at the following website: [email or web address]. By joining [email or web address]’s FollowMyHealth portal, you will also be able to view your health information using other applications (apps) compatible with our system.

## 2020-02-27 NOTE — ED PROVIDER NOTE - PHYSICAL EXAMINATION
CONSTITUTIONAL: Well-developed; well-nourished; in no acute distress.   SKIN: healed laceration to posterior chin.  LYMPH: No acute cervical adenopathy.  NEURO: Alert, oriented, grossly unremarkable  PSYCH: Cooperative, appropriate.

## 2020-02-27 NOTE — ED PROVIDER NOTE - NSFOLLOWUPINSTRUCTIONS_ED_ALL_ED_FT
Your stitches were removed. Bathe as you normally would. The strips under your eye will fall off in the shower. Return to ED if redness, discharge, or new concerns.

## 2020-02-28 NOTE — HISTORY OF PRESENT ILLNESS
[FreeTextEntry1] : MR. Davies 85 year old male with pmh of liver cancer in remission and no cardiac risk factors presented SI for cough and shortness of breath. Initially diagnosed with community-acquired pneumonia and started on azithromycin and rocephin. Patient was also in newly-discovered AFib with RVR on presentation. Overnight he became a STEMI. Patient underwent emergent LHC which demonstrated multivessel disease. He had a PCI to the RCA. Patient underwent repeat catheterization on 2/12 which demonstrated LM, LAD, and RPDA lesions; CTS evaluated patient. He arrives today for a follow up consultation. \par

## 2020-02-28 NOTE — ASSESSMENT
[FreeTextEntry1] : MR. Davies 85 year old male with pmh of liver cncer in remission and no cardiac risk factors presented SI for cough and shortness of breath. Initially diagnosed with community-acquired pneumonia and started on azithromycin and rocephin. Patient was also in newly-discovered AFib with RVR on presentation. Overnight he became a STEMI. Patient underwent emergent LHC which demonstrated multivessel disease. He had a PCI to the RCA. Patient underwent repeat catheterization on 2/12 which demonstrated LM, LAD, and RPDA lesions; CTS evaluated patient. He arrives today for a follow up consultation. Plan will be to wait 30 days post PCI and plan for CABG on 3/16/20. Stop Plavix 5 days prior. Drop aspirin to 81 mg and plan for PAST. \par \par NINO FongBC am acting as the scribe for Dr. Garcia.\par

## 2020-02-28 NOTE — DATA REVIEWED
[FreeTextEntry1] : EXAM: CT CHEST \par \par \par PROCEDURE DATE: 02/12/2020 \par \par \par \par \par INTERPRETATION: CLINICAL HISTORY / REASON FOR EXAM: Preoperative for CABG. \par STEMI. \par \par TECHNIQUE: Multislice helical sections were obtained from the thoracic inlet \par to the lung bases without intravenous contrast. Coronal, sagittal and 3D/MIP \par reformatted images are also submitted. \par \par COMPARISON: None. \par \par CORRELATION: XR CHEST 2/12/2020. \par \par FINDINGS: \par \par TUBES/LINES: None. \par \par LUNGS, PLEURA, AND AIRWAYS: No effusion or pneumothorax. No evidence of \par endobronchial obstruction, bronchiectasis or honeycombing. Bilateral \par calcified pleural plaques are noted..Right basilar consolidation/atelectasis \par and left basilar linear atelectasis. \par \par PULMONARY NODULES: No pulmonary nodules identified. \par \par MEDIASTINUM/LYMPH NODES: Unremarkable thyroid gland. No mediastinal or \par axillary lymphadenopathy. \par \par HEART/GREAT VESSELS: Cardiomegaly with trace pericardial effusion. Coronary \par artery disease, severe at the LAD. Calcified aortic valve. The ascending \par aorta is free of calcified plaque. Atherosclerotic calcifications are noted \par at the aortic arch and descending thoracic aorta. Ascending aorta caliber at \par the upper limit of normal (4.0 cm). \par \par BONES/SOFT TISSUES: Degenerative changes to the thoracic spine. \par \par VISUALIZED UPPER ABDOMEN: Right renal cyst. Punctate hyperdensities within \par the bilateral renal collecting system, which may represent recent \par intervention with contrast material versus punctate nonobstructing renal \par calculi. \par \par \par IMPRESSION: \par \par Cardiomegaly with trace pericardial effusion. Coronary artery disease, \par severe at the LAD. \par \par Ascending aorta caliber at the upper limit of normal (4.0 cm). Calcified \par aortic valve. The ascending aorta is free of calcified plaque. \par Atherosclerotic calcifications are noted at the aortic arch and descending \par thoracic aorta. \par \par Bilateral calcified pleural plaques. Right basilar \par consolidation/atelectasis. \par \par Right renal cyst. Punctate hyperdensities within the bilateral renal \par collecting system, which may represent recent intervention with contrast \par material versus punctate nonobstructing renal calculi. Correlate clinically. \par \par EXAM: CAROTID DUPLEX BILATERAL \par \par \par PROCEDURE DATE: 02/12/2020 \par \par \par \par \par INTERPRETATION: Clinical History / Reason for exam: The patient is a \par 85-year-old male with severe coronary artery disease. The study was \par performed to screen the patient for carotid artery stenosis. \par \par Duplex evaluation of the carotid arteries was performed bilaterally. \par In the right carotid system, the internal carotid artery is noted to be \par patent with a peak systolic velocity of 84 cm/sec over end diastolic \par velocity of 18 cm/sec. \par \par In the left carotid system, the internal carotid artery is noted to be \par patent with a peak systolic velocity of 98 cm/sec over end diastolic \par velocity of 29 cm/sec. \par \par The right vertebral arterial flow was antegrade. \par \par The left vertebral arterial flow was antegrade. \par \par Impression: \par \par 20-39% stenosis of the right internal carotid artery. \par \par 20-39% stenosis of the left internal carotid artery. \par \par EXAM: 2-D ECHO (TTE) COMPLETE \par \par PROCEDURE DATE: 02/09/2020 \par \par INTERPRETATION: REPORT: \par TRANSTHORACIC ECHOCARDIOGRAM REPORT \par \par Summary: \par  1. Left ventricular ejection fraction, by visual estimation, is 50 to 55%. \par  2. Spectral Doppler shows impaired relaxation pattern of left ventricular \par myocardial filling (Grade I diastolic dysfunction). \par  3. Mild mitral valve regurgitation. \par  4. Mild aortic regurgitation. \par  5. There is moderate aortic root calcification. \par \par PHYSICIAN INTERPRETATION: \par Left Ventricle: The left ventricular internal cavity size is normal. Left \par ventricular wall thickness is normal. Left ventricular ejection fraction, by \par visual estimation, is 50 to 55%. Spectral Doppler shows impaired relaxation \par pattern of left ventricular myocardial filling (Grade I diastolic \par dysfunction). \par Right Ventricle: The right ventricular size is mildly enlarged. RV systolic \par function is mildly reduced. \par Left Atrium: Mildly enlarged left atrium. \par Right Atrium: Mildly enlarged right atrium. \par Mitral Valve: Structurally normal mitral valve, with normal leaflet \par excursion. No evidence of mitral valve stenosis. Mild mitral valve \par regurgitation is seen. \par Tricuspid Valve: Structurally normal tricuspid valve, with normal leaflet \par excursion. No tricuspid regurgitation is visualized. \par Aortic Valve: The aortic valve is trileaflet. No evidence of aortic \par stenosis. Mild aortic valve regurgitation is seen. \par Pulmonic Valve: Structurally normal pulmonic valve, with normal leaflet \par excursion. Trace pulmonic valve regurgitation. \par Aorta: The aortic root is normal in size and structure. There is moderate \par aortic root calcification. \par Venous: The inferior vena cava was normal sized, with respiratory size \par variation greater than 50%. \par \par Cath 2/12/20\par CORONARY CIRCULATION: The coronary circulation is right dominant. There was\par significant left main disease. There was 2-vessel coronary artery disease\par (LAD and RCA). Left main: Angiography showed a single discrete lesion.\par Distal left main: There was a tubular 70 % stenosis. LAD: The vessel was\par heavily calcified. Angiography showed multiple discrete lesions. Proximal\par LAD: There was a diffuse 70 % stenosis. Mid LAD: There was a tubular 85 %\par stenosis. Distal vessel angiography showed minor luminal irregularities.\par Circumflex: Angiography showed minor luminal irregularities with no flow\par limiting lesions. Proximal circumflex: The vessel had an excessively\par ectatic focal segment. Ramus intermedius: Angiography showed minor luminal\par irregularities with no flow limiting lesions. Proximal RCA: Angiography\par showed moderate atherosclerosis with no clinical lesions appreciated. Mid\par RCA: Angiography showed minor luminal irregularities with no flow limiting\par lesions. Distal RCA: Angiography showed patent stent. Right PDA:\par Angiography showed a single discrete lesion. There was a tubular 95 %\par stenosis at the ostium of the vessel segment.\par \par \par 5M Walk \par 1. 5\par 2.5\par 3.5 \par ==5

## 2020-02-28 NOTE — ADDENDUM
[FreeTextEntry1] : CTS Attending\par \par I interviewed and examined the patient as described in this report.  I had a long discussion with the patient and his son.  Apparently they want to travel back to [place], which is medically contraindicated as counseled by Dr. Rouse and myself.\par \par Patient and son were informed of the need for CABG in the setting of 70% left main stenosis, post STEMI-PCI of RCA, with a residual distal 95% lesion in the RPDA.  The recommendation is to wait two weeks and proceed with full revascularization by CABG.\par \par I personally performed the services described in the documentation, reviewed the documentation recorded by the scribe in my presence and it accurately and completely records my words and actions.\par \par \par The procedure, risks, benefits and alternatives were discussed and patient agreed to schedule surgery as recommended.  Preop testing will also be scheduled.

## 2020-03-02 PROBLEM — E78.00 PURE HYPERCHOLESTEROLEMIA, UNSPECIFIED: Chronic | Status: ACTIVE | Noted: 2020-02-27

## 2020-03-10 ENCOUNTER — OUTPATIENT (OUTPATIENT)
Dept: OUTPATIENT SERVICES | Facility: HOSPITAL | Age: 85
LOS: 1 days | Discharge: HOME | End: 2020-03-10
Payer: MEDICARE

## 2020-03-10 ENCOUNTER — OUTPATIENT (OUTPATIENT)
Dept: OUTPATIENT SERVICES | Facility: HOSPITAL | Age: 85
LOS: 1 days | Discharge: HOME | End: 2020-03-10

## 2020-03-10 VITALS
DIASTOLIC BLOOD PRESSURE: 64 MMHG | WEIGHT: 197.09 LBS | TEMPERATURE: 97 F | HEIGHT: 68 IN | RESPIRATION RATE: 18 BRPM | HEART RATE: 70 BPM | SYSTOLIC BLOOD PRESSURE: 120 MMHG | OXYGEN SATURATION: 98 %

## 2020-03-10 DIAGNOSIS — Z95.5 PRESENCE OF CORONARY ANGIOPLASTY IMPLANT AND GRAFT: Chronic | ICD-10-CM

## 2020-03-10 DIAGNOSIS — Z00.00 ENCOUNTER FOR GENERAL ADULT MEDICAL EXAMINATION WITHOUT ABNORMAL FINDINGS: ICD-10-CM

## 2020-03-10 DIAGNOSIS — Z98.890 OTHER SPECIFIED POSTPROCEDURAL STATES: Chronic | ICD-10-CM

## 2020-03-10 DIAGNOSIS — I25.10 ATHEROSCLEROTIC HEART DISEASE OF NATIVE CORONARY ARTERY WITHOUT ANGINA PECTORIS: ICD-10-CM

## 2020-03-10 DIAGNOSIS — Z01.818 ENCOUNTER FOR OTHER PREPROCEDURAL EXAMINATION: ICD-10-CM

## 2020-03-10 LAB
ALBUMIN SERPL ELPH-MCNC: 4.1 G/DL — SIGNIFICANT CHANGE UP (ref 3.5–5.2)
ALP SERPL-CCNC: 95 U/L — SIGNIFICANT CHANGE UP (ref 30–115)
ALT FLD-CCNC: 13 U/L — SIGNIFICANT CHANGE UP (ref 0–41)
ANION GAP SERPL CALC-SCNC: 13 MMOL/L — SIGNIFICANT CHANGE UP (ref 7–14)
APPEARANCE UR: CLEAR — SIGNIFICANT CHANGE UP
APTT BLD: 27.7 SEC — SIGNIFICANT CHANGE UP (ref 27–39.2)
AST SERPL-CCNC: 18 U/L — SIGNIFICANT CHANGE UP (ref 0–41)
BASOPHILS # BLD AUTO: 0.01 K/UL — SIGNIFICANT CHANGE UP (ref 0–0.2)
BASOPHILS NFR BLD AUTO: 0.2 % — SIGNIFICANT CHANGE UP (ref 0–1)
BILIRUB SERPL-MCNC: 1.3 MG/DL — HIGH (ref 0.2–1.2)
BILIRUB UR-MCNC: NEGATIVE — SIGNIFICANT CHANGE UP
BLD GP AB SCN SERPL QL: SIGNIFICANT CHANGE UP
BUN SERPL-MCNC: 11 MG/DL — SIGNIFICANT CHANGE UP (ref 10–20)
CALCIUM SERPL-MCNC: 9.2 MG/DL — SIGNIFICANT CHANGE UP (ref 8.5–10.1)
CHLORIDE SERPL-SCNC: 100 MMOL/L — SIGNIFICANT CHANGE UP (ref 98–110)
CO2 SERPL-SCNC: 27 MMOL/L — SIGNIFICANT CHANGE UP (ref 17–32)
COLOR SPEC: YELLOW — SIGNIFICANT CHANGE UP
CREAT SERPL-MCNC: 1 MG/DL — SIGNIFICANT CHANGE UP (ref 0.7–1.5)
DIFF PNL FLD: NEGATIVE — SIGNIFICANT CHANGE UP
EOSINOPHIL # BLD AUTO: 0.08 K/UL — SIGNIFICANT CHANGE UP (ref 0–0.7)
EOSINOPHIL NFR BLD AUTO: 1.6 % — SIGNIFICANT CHANGE UP (ref 0–8)
ESTIMATED AVERAGE GLUCOSE: 128 MG/DL — HIGH (ref 68–114)
GLUCOSE SERPL-MCNC: 97 MG/DL — SIGNIFICANT CHANGE UP (ref 70–99)
GLUCOSE UR QL: NEGATIVE — SIGNIFICANT CHANGE UP
HBA1C BLD-MCNC: 6.1 % — HIGH (ref 4–5.6)
HCT VFR BLD CALC: 39.5 % — LOW (ref 42–52)
HGB BLD-MCNC: 12.9 G/DL — LOW (ref 14–18)
IMM GRANULOCYTES NFR BLD AUTO: 0 % — LOW (ref 0.1–0.3)
INR BLD: 1.03 RATIO — SIGNIFICANT CHANGE UP (ref 0.65–1.3)
KETONES UR-MCNC: NEGATIVE — SIGNIFICANT CHANGE UP
LEUKOCYTE ESTERASE UR-ACNC: NEGATIVE — SIGNIFICANT CHANGE UP
LYMPHOCYTES # BLD AUTO: 1.45 K/UL — SIGNIFICANT CHANGE UP (ref 1.2–3.4)
LYMPHOCYTES # BLD AUTO: 28.8 % — SIGNIFICANT CHANGE UP (ref 20.5–51.1)
MCHC RBC-ENTMCNC: 31.9 PG — HIGH (ref 27–31)
MCHC RBC-ENTMCNC: 32.7 G/DL — SIGNIFICANT CHANGE UP (ref 32–37)
MCV RBC AUTO: 97.5 FL — HIGH (ref 80–94)
MONOCYTES # BLD AUTO: 0.34 K/UL — SIGNIFICANT CHANGE UP (ref 0.1–0.6)
MONOCYTES NFR BLD AUTO: 6.7 % — SIGNIFICANT CHANGE UP (ref 1.7–9.3)
NEUTROPHILS # BLD AUTO: 3.16 K/UL — SIGNIFICANT CHANGE UP (ref 1.4–6.5)
NEUTROPHILS NFR BLD AUTO: 62.7 % — SIGNIFICANT CHANGE UP (ref 42.2–75.2)
NITRITE UR-MCNC: NEGATIVE — SIGNIFICANT CHANGE UP
NRBC # BLD: 0 /100 WBCS — SIGNIFICANT CHANGE UP (ref 0–0)
NT-PROBNP SERPL-SCNC: 1812 PG/ML — HIGH (ref 0–300)
PH UR: 6.5 — SIGNIFICANT CHANGE UP (ref 5–8)
PLATELET # BLD AUTO: 132 K/UL — SIGNIFICANT CHANGE UP (ref 130–400)
POTASSIUM SERPL-MCNC: 4 MMOL/L — SIGNIFICANT CHANGE UP (ref 3.5–5)
POTASSIUM SERPL-SCNC: 4 MMOL/L — SIGNIFICANT CHANGE UP (ref 3.5–5)
PROT SERPL-MCNC: 6.6 G/DL — SIGNIFICANT CHANGE UP (ref 6–8)
PROT UR-MCNC: SIGNIFICANT CHANGE UP
PROTHROM AB SERPL-ACNC: 11.8 SEC — SIGNIFICANT CHANGE UP (ref 9.95–12.87)
RBC # BLD: 4.05 M/UL — LOW (ref 4.7–6.1)
RBC # FLD: 12.7 % — SIGNIFICANT CHANGE UP (ref 11.5–14.5)
SODIUM SERPL-SCNC: 140 MMOL/L — SIGNIFICANT CHANGE UP (ref 135–146)
SP GR SPEC: 1.02 — SIGNIFICANT CHANGE UP (ref 1.01–1.02)
UROBILINOGEN FLD QL: SIGNIFICANT CHANGE UP
WBC # BLD: 5.04 K/UL — SIGNIFICANT CHANGE UP (ref 4.8–10.8)
WBC # FLD AUTO: 5.04 K/UL — SIGNIFICANT CHANGE UP (ref 4.8–10.8)

## 2020-03-10 PROCEDURE — 93306 TTE W/DOPPLER COMPLETE: CPT | Mod: 26

## 2020-03-10 PROCEDURE — 93010 ELECTROCARDIOGRAM REPORT: CPT

## 2020-03-10 PROCEDURE — 71046 X-RAY EXAM CHEST 2 VIEWS: CPT | Mod: 26

## 2020-03-10 NOTE — H&P PST ADULT - NSICDXPASTMEDICALHX_GEN_ALL_CORE_FT
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

## 2020-03-10 NOTE — H&P PST ADULT - NSANTHOSAYNRD_GEN_A_CORE
No. NYLA screening performed.  STOP BANG Legend: 0-2 = LOW Risk; 3-4 = INTERMEDIATE Risk; 5-8 = HIGH Risk

## 2020-03-10 NOTE — H&P PST ADULT - PULMONARY EMBOLUS
Telephone Encounter by Jolanta Hooks NCMA at 05/02/18 05:32 PM     Author:  Jolanta Hooks NCMA Service:  (none) Author Type:  Certified Medical Assistant     Filed:  05/02/18 05:37 PM Encounter Date:  5/1/2018 Status:  Signed     :  Jolanta Hooks NCMA (Certified Medical Assistant)            Last Plaquenil given 05/05/2017  Last ov 11/06/2017  Next ov none   Last eye exam 5/18/2016  Please advise[LM1.1M]       Revision History        User Key Date/Time User Provider Type Action    > LM1.1 05/02/18 05:37 PM Jolanta Hooks NCMA Certified Medical Assistant Sign    M - Manual            
Telephone Encounter by Krystina Osman CMA at 05/03/18 11:48 AM     Author:  Krystina Osman CMA Service:  (none) Author Type:  Certified Medical Assistant     Filed:  05/03/18 11:48 AM Encounter Date:  5/1/2018 Status:  Signed     :  Krystina Osman CMA (Certified Medical Assistant)            Refill request denied[JB1.1M]      Revision History        User Key Date/Time User Provider Type Action    > JB1.1 05/03/18 11:48 AM Krystina Osman CMA Certified Medical Assistant Sign    M - Manual            
Telephone Encounter by Teto Cesar MD at 05/02/18 08:09 PM     Author:  Teto Cesar MD Service:  (none) Author Type:  Physician     Filed:  05/02/18 08:09 PM Encounter Date:  5/1/2018 Status:  Signed     :  Teto Cesar MD (Physician)            No refills needs appt overdue[AJ1.1M]       Revision History        User Key Date/Time User Provider Type Action    > AJ1.1 05/02/18 08:09 PM Teto Cesar MD Physician Sign    M - Manual            
no

## 2020-03-11 ENCOUNTER — OUTPATIENT (OUTPATIENT)
Dept: OUTPATIENT SERVICES | Facility: HOSPITAL | Age: 85
LOS: 1 days | Discharge: HOME | End: 2020-03-11

## 2020-03-11 DIAGNOSIS — I48.92 UNSPECIFIED ATRIAL FLUTTER: ICD-10-CM

## 2020-03-11 DIAGNOSIS — D62 ACUTE POSTHEMORRHAGIC ANEMIA: ICD-10-CM

## 2020-03-11 DIAGNOSIS — I97.89 OTHER POSTPROCEDURAL COMPLICATIONS AND DISORDERS OF THE CIRCULATORY SYSTEM, NOT ELSEWHERE CLASSIFIED: ICD-10-CM

## 2020-03-11 DIAGNOSIS — I25.10 ATHEROSCLEROTIC HEART DISEASE OF NATIVE CORONARY ARTERY WITHOUT ANGINA PECTORIS: ICD-10-CM

## 2020-03-11 DIAGNOSIS — I50.22 CHRONIC SYSTOLIC (CONGESTIVE) HEART FAILURE: ICD-10-CM

## 2020-03-11 DIAGNOSIS — Z95.5 PRESENCE OF CORONARY ANGIOPLASTY IMPLANT AND GRAFT: Chronic | ICD-10-CM

## 2020-03-11 DIAGNOSIS — Z98.890 OTHER SPECIFIED POSTPROCEDURAL STATES: Chronic | ICD-10-CM

## 2020-03-11 PROBLEM — I21.9 ACUTE MYOCARDIAL INFARCTION, UNSPECIFIED: Chronic | Status: ACTIVE | Noted: 2020-02-20

## 2020-03-11 PROBLEM — C25.9 MALIGNANT NEOPLASM OF PANCREAS, UNSPECIFIED: Chronic | Status: ACTIVE | Noted: 2020-03-10

## 2020-03-11 LAB — MRSA PCR RESULT.: NEGATIVE — SIGNIFICANT CHANGE UP

## 2020-03-12 DIAGNOSIS — Z01.818 ENCOUNTER FOR OTHER PREPROCEDURAL EXAMINATION: ICD-10-CM

## 2020-03-12 DIAGNOSIS — Z02.9 ENCOUNTER FOR ADMINISTRATIVE EXAMINATIONS, UNSPECIFIED: ICD-10-CM

## 2020-03-13 VITALS — HEIGHT: 67.99 IN | WEIGHT: 196.87 LBS

## 2020-03-16 ENCOUNTER — INPATIENT (INPATIENT)
Facility: HOSPITAL | Age: 85
LOS: 7 days | Discharge: HOME HEALTH | End: 2020-03-24
Attending: THORACIC SURGERY (CARDIOTHORACIC VASCULAR SURGERY) | Admitting: THORACIC SURGERY (CARDIOTHORACIC VASCULAR SURGERY)
Payer: MEDICARE

## 2020-03-16 DIAGNOSIS — Z98.890 OTHER SPECIFIED POSTPROCEDURAL STATES: Chronic | ICD-10-CM

## 2020-03-16 DIAGNOSIS — Z95.5 PRESENCE OF CORONARY ANGIOPLASTY IMPLANT AND GRAFT: Chronic | ICD-10-CM

## 2020-03-16 LAB
ABO RH CONFIRMATION: SIGNIFICANT CHANGE UP
ALBUMIN SERPL ELPH-MCNC: 4.2 G/DL — SIGNIFICANT CHANGE UP (ref 3.5–5.2)
ALP SERPL-CCNC: 33 U/L — SIGNIFICANT CHANGE UP (ref 30–115)
ALT FLD-CCNC: 6 U/L — SIGNIFICANT CHANGE UP (ref 0–41)
ANION GAP SERPL CALC-SCNC: 7 MMOL/L — SIGNIFICANT CHANGE UP (ref 7–14)
APTT BLD: 33.2 SEC — SIGNIFICANT CHANGE UP (ref 27–39.2)
AST SERPL-CCNC: 13 U/L — SIGNIFICANT CHANGE UP (ref 0–41)
BASE EXCESS BLDMV CALC-SCNC: -2 MMOL/L — SIGNIFICANT CHANGE UP
BASOPHILS # BLD AUTO: 0.01 K/UL — SIGNIFICANT CHANGE UP (ref 0–0.2)
BASOPHILS NFR BLD AUTO: 0.1 % — SIGNIFICANT CHANGE UP (ref 0–1)
BILIRUB SERPL-MCNC: 1.7 MG/DL — HIGH (ref 0.2–1.2)
BUN SERPL-MCNC: 12 MG/DL — SIGNIFICANT CHANGE UP (ref 10–20)
CALCIUM SERPL-MCNC: 7.4 MG/DL — LOW (ref 8.5–10.1)
CHLORIDE SERPL-SCNC: 109 MMOL/L — SIGNIFICANT CHANGE UP (ref 98–110)
CO2 SERPL-SCNC: 24 MMOL/L — SIGNIFICANT CHANGE UP (ref 17–32)
CREAT SERPL-MCNC: 1 MG/DL — SIGNIFICANT CHANGE UP (ref 0.7–1.5)
EOSINOPHIL # BLD AUTO: 0.01 K/UL — SIGNIFICANT CHANGE UP (ref 0–0.7)
EOSINOPHIL NFR BLD AUTO: 0.1 % — SIGNIFICANT CHANGE UP (ref 0–8)
GAS PNL BLDA: SIGNIFICANT CHANGE UP
GLUCOSE BLDC GLUCOMTR-MCNC: 106 MG/DL — HIGH (ref 70–99)
GLUCOSE BLDC GLUCOMTR-MCNC: 111 MG/DL — HIGH (ref 70–99)
GLUCOSE BLDC GLUCOMTR-MCNC: 119 MG/DL — HIGH (ref 70–99)
GLUCOSE BLDC GLUCOMTR-MCNC: 153 MG/DL — HIGH (ref 70–99)
GLUCOSE BLDC GLUCOMTR-MCNC: 88 MG/DL — SIGNIFICANT CHANGE UP (ref 70–99)
GLUCOSE BLDC GLUCOMTR-MCNC: 89 MG/DL — SIGNIFICANT CHANGE UP (ref 70–99)
GLUCOSE SERPL-MCNC: 86 MG/DL — SIGNIFICANT CHANGE UP (ref 70–99)
HCO3 BLDMV-SCNC: 24 MMOL/L — SIGNIFICANT CHANGE UP
HCT VFR BLD CALC: 23.2 % — LOW (ref 42–52)
HGB BLD-MCNC: 8 G/DL — LOW (ref 14–18)
IMM GRANULOCYTES NFR BLD AUTO: 0.4 % — HIGH (ref 0.1–0.3)
INR BLD: 1.45 RATIO — HIGH (ref 0.65–1.3)
LYMPHOCYTES # BLD AUTO: 1.72 K/UL — SIGNIFICANT CHANGE UP (ref 1.2–3.4)
LYMPHOCYTES # BLD AUTO: 15.5 % — LOW (ref 20.5–51.1)
MAGNESIUM SERPL-MCNC: 2 MG/DL — SIGNIFICANT CHANGE UP (ref 1.8–2.4)
MCHC RBC-ENTMCNC: 32.8 PG — HIGH (ref 27–31)
MCHC RBC-ENTMCNC: 34.5 G/DL — SIGNIFICANT CHANGE UP (ref 32–37)
MCV RBC AUTO: 95.1 FL — HIGH (ref 80–94)
MONOCYTES # BLD AUTO: 1.09 K/UL — HIGH (ref 0.1–0.6)
MONOCYTES NFR BLD AUTO: 9.8 % — HIGH (ref 1.7–9.3)
NEUTROPHILS # BLD AUTO: 8.2 K/UL — HIGH (ref 1.4–6.5)
NEUTROPHILS NFR BLD AUTO: 74.1 % — SIGNIFICANT CHANGE UP (ref 42.2–75.2)
NRBC # BLD: 0 /100 WBCS — SIGNIFICANT CHANGE UP (ref 0–0)
O2 CT VFR BLD CALC: 39 MMHG — SIGNIFICANT CHANGE UP (ref 35–40)
PCO2 BLDMV: 50 MMHG — SIGNIFICANT CHANGE UP (ref 41–51)
PH BLDMV: 7.29 — LOW (ref 7.33–7.44)
PLATELET # BLD AUTO: 92 K/UL — LOW (ref 130–400)
POTASSIUM SERPL-MCNC: 3.8 MMOL/L — SIGNIFICANT CHANGE UP (ref 3.5–5)
POTASSIUM SERPL-SCNC: 3.8 MMOL/L — SIGNIFICANT CHANGE UP (ref 3.5–5)
PROT SERPL-MCNC: 5 G/DL — LOW (ref 6–8)
PROTHROM AB SERPL-ACNC: 16.7 SEC — HIGH (ref 9.95–12.87)
RBC # BLD: 2.44 M/UL — LOW (ref 4.7–6.1)
RBC # FLD: 14.1 % — SIGNIFICANT CHANGE UP (ref 11.5–14.5)
SAO2 % BLDMV: 73 % — SIGNIFICANT CHANGE UP (ref 70–75)
SODIUM SERPL-SCNC: 140 MMOL/L — SIGNIFICANT CHANGE UP (ref 135–146)
WBC # BLD: 11.07 K/UL — HIGH (ref 4.8–10.8)
WBC # FLD AUTO: 11.07 K/UL — HIGH (ref 4.8–10.8)

## 2020-03-16 PROCEDURE — 33519 CABG ARTERY-VEIN THREE: CPT

## 2020-03-16 PROCEDURE — 33519 CABG ARTERY-VEIN THREE: CPT | Mod: AS

## 2020-03-16 PROCEDURE — 71045 X-RAY EXAM CHEST 1 VIEW: CPT | Mod: 26

## 2020-03-16 PROCEDURE — 33508 ENDOSCOPIC VEIN HARVEST: CPT

## 2020-03-16 PROCEDURE — 33508 ENDOSCOPIC VEIN HARVEST: CPT | Mod: AS

## 2020-03-16 PROCEDURE — 93010 ELECTROCARDIOGRAM REPORT: CPT

## 2020-03-16 PROCEDURE — 33533 CABG ARTERIAL SINGLE: CPT

## 2020-03-16 PROCEDURE — 33533 CABG ARTERIAL SINGLE: CPT | Mod: AS

## 2020-03-16 RX ORDER — SENNA PLUS 8.6 MG/1
1 TABLET ORAL EVERY 12 HOURS
Refills: 0 | Status: DISCONTINUED | OUTPATIENT
Start: 2020-03-16 | End: 2020-03-18

## 2020-03-16 RX ORDER — POLYETHYLENE GLYCOL 3350 17 G/17G
17 POWDER, FOR SOLUTION ORAL DAILY
Refills: 0 | Status: DISCONTINUED | OUTPATIENT
Start: 2020-03-16 | End: 2020-03-24

## 2020-03-16 RX ORDER — PROPOFOL 10 MG/ML
15 INJECTION, EMULSION INTRAVENOUS
Qty: 1000 | Refills: 0 | Status: DISCONTINUED | OUTPATIENT
Start: 2020-03-16 | End: 2020-03-17

## 2020-03-16 RX ORDER — VASOPRESSIN 20 [USP'U]/ML
0.04 INJECTION INTRAVENOUS
Qty: 50 | Refills: 0 | Status: DISCONTINUED | OUTPATIENT
Start: 2020-03-16 | End: 2020-03-19

## 2020-03-16 RX ORDER — DEXTROSE 50 % IN WATER 50 %
50 SYRINGE (ML) INTRAVENOUS
Refills: 0 | Status: DISCONTINUED | OUTPATIENT
Start: 2020-03-16 | End: 2020-03-24

## 2020-03-16 RX ORDER — ALBUTEROL 90 UG/1
2 AEROSOL, METERED ORAL EVERY 6 HOURS
Refills: 0 | Status: DISCONTINUED | OUTPATIENT
Start: 2020-03-16 | End: 2020-03-17

## 2020-03-16 RX ORDER — DEXMEDETOMIDINE HYDROCHLORIDE IN 0.9% SODIUM CHLORIDE 4 UG/ML
0.5 INJECTION INTRAVENOUS
Qty: 200 | Refills: 0 | Status: DISCONTINUED | OUTPATIENT
Start: 2020-03-16 | End: 2020-03-17

## 2020-03-16 RX ORDER — SODIUM CHLORIDE 9 MG/ML
1000 INJECTION INTRAMUSCULAR; INTRAVENOUS; SUBCUTANEOUS
Refills: 0 | Status: DISCONTINUED | OUTPATIENT
Start: 2020-03-16 | End: 2020-03-24

## 2020-03-16 RX ORDER — MEPERIDINE HYDROCHLORIDE 50 MG/ML
25 INJECTION INTRAMUSCULAR; INTRAVENOUS; SUBCUTANEOUS ONCE
Refills: 0 | Status: DISCONTINUED | OUTPATIENT
Start: 2020-03-16 | End: 2020-03-19

## 2020-03-16 RX ORDER — DEXTROSE 50 % IN WATER 50 %
25 SYRINGE (ML) INTRAVENOUS
Refills: 0 | Status: DISCONTINUED | OUTPATIENT
Start: 2020-03-16 | End: 2020-03-24

## 2020-03-16 RX ORDER — MAGNESIUM SULFATE 500 MG/ML
1 VIAL (ML) INJECTION EVERY 12 HOURS
Refills: 0 | Status: DISCONTINUED | OUTPATIENT
Start: 2020-03-16 | End: 2020-03-24

## 2020-03-16 RX ORDER — CEFAZOLIN SODIUM 1 G
1000 VIAL (EA) INJECTION EVERY 8 HOURS
Refills: 0 | Status: COMPLETED | OUTPATIENT
Start: 2020-03-16 | End: 2020-03-17

## 2020-03-16 RX ORDER — OXYCODONE HYDROCHLORIDE 5 MG/1
10 TABLET ORAL EVERY 4 HOURS
Refills: 0 | Status: DISCONTINUED | OUTPATIENT
Start: 2020-03-16 | End: 2020-03-17

## 2020-03-16 RX ORDER — IPRATROPIUM BROMIDE 0.2 MG/ML
2 SOLUTION, NON-ORAL INHALATION EVERY 6 HOURS
Refills: 0 | Status: DISCONTINUED | OUTPATIENT
Start: 2020-03-16 | End: 2020-03-17

## 2020-03-16 RX ORDER — NOREPINEPHRINE BITARTRATE/D5W 8 MG/250ML
0.05 PLASTIC BAG, INJECTION (ML) INTRAVENOUS
Qty: 8 | Refills: 0 | Status: DISCONTINUED | OUTPATIENT
Start: 2020-03-16 | End: 2020-03-19

## 2020-03-16 RX ORDER — ASPIRIN/CALCIUM CARB/MAGNESIUM 324 MG
325 TABLET ORAL DAILY
Refills: 0 | Status: DISCONTINUED | OUTPATIENT
Start: 2020-03-16 | End: 2020-03-17

## 2020-03-16 RX ORDER — CHLORHEXIDINE GLUCONATE 213 G/1000ML
1 SOLUTION TOPICAL DAILY
Refills: 0 | Status: DISCONTINUED | OUTPATIENT
Start: 2020-03-16 | End: 2020-03-24

## 2020-03-16 RX ORDER — OXYCODONE HYDROCHLORIDE 5 MG/1
5 TABLET ORAL EVERY 4 HOURS
Refills: 0 | Status: DISCONTINUED | OUTPATIENT
Start: 2020-03-16 | End: 2020-03-17

## 2020-03-16 RX ORDER — NITROGLYCERIN 6.5 MG
15 CAPSULE, EXTENDED RELEASE ORAL
Qty: 50 | Refills: 0 | Status: DISCONTINUED | OUTPATIENT
Start: 2020-03-16 | End: 2020-03-19

## 2020-03-16 RX ORDER — INSULIN HUMAN 100 [IU]/ML
1 INJECTION, SOLUTION SUBCUTANEOUS
Qty: 100 | Refills: 0 | Status: DISCONTINUED | OUTPATIENT
Start: 2020-03-16 | End: 2020-03-18

## 2020-03-16 RX ORDER — ASPIRIN/CALCIUM CARB/MAGNESIUM 324 MG
300 TABLET ORAL ONCE
Refills: 0 | Status: COMPLETED | OUTPATIENT
Start: 2020-03-16 | End: 2020-03-16

## 2020-03-16 RX ORDER — FAMOTIDINE 10 MG/ML
20 INJECTION INTRAVENOUS EVERY 12 HOURS
Refills: 0 | Status: DISCONTINUED | OUTPATIENT
Start: 2020-03-16 | End: 2020-03-17

## 2020-03-16 RX ORDER — CHLORHEXIDINE GLUCONATE 213 G/1000ML
5 SOLUTION TOPICAL EVERY 4 HOURS
Refills: 0 | Status: DISCONTINUED | OUTPATIENT
Start: 2020-03-16 | End: 2020-03-17

## 2020-03-16 RX ADMIN — INSULIN HUMAN 1 UNIT(S)/HR: 100 INJECTION, SOLUTION SUBCUTANEOUS at 16:18

## 2020-03-16 RX ADMIN — CHLORHEXIDINE GLUCONATE 5 MILLILITER(S): 213 SOLUTION TOPICAL at 23:56

## 2020-03-16 RX ADMIN — FAMOTIDINE 20 MILLIGRAM(S): 10 INJECTION INTRAVENOUS at 18:12

## 2020-03-16 RX ADMIN — Medication 8.37 MICROGRAM(S)/KG/MIN: at 23:54

## 2020-03-16 RX ADMIN — DEXMEDETOMIDINE HYDROCHLORIDE IN 0.9% SODIUM CHLORIDE 11.2 MICROGRAM(S)/KG/HR: 4 INJECTION INTRAVENOUS at 18:12

## 2020-03-16 RX ADMIN — SODIUM CHLORIDE 20 MILLILITER(S): 9 INJECTION INTRAMUSCULAR; INTRAVENOUS; SUBCUTANEOUS at 16:15

## 2020-03-16 RX ADMIN — Medication 100 MILLIGRAM(S): at 18:11

## 2020-03-16 RX ADMIN — CHLORHEXIDINE GLUCONATE 5 MILLILITER(S): 213 SOLUTION TOPICAL at 18:12

## 2020-03-16 RX ADMIN — VASOPRESSIN 2.4 UNIT(S)/MIN: 20 INJECTION INTRAVENOUS at 23:54

## 2020-03-16 RX ADMIN — DEXMEDETOMIDINE HYDROCHLORIDE IN 0.9% SODIUM CHLORIDE 11.2 MICROGRAM(S)/KG/HR: 4 INJECTION INTRAVENOUS at 16:18

## 2020-03-16 RX ADMIN — Medication 8.37 MICROGRAM(S)/KG/MIN: at 16:19

## 2020-03-16 RX ADMIN — Medication 100 GRAM(S): at 18:12

## 2020-03-16 NOTE — BRIEF OPERATIVE NOTE - NSICDXBRIEFPOSTOP_GEN_ALL_CORE_FT
POST-OP DIAGNOSIS:  Triple vessel coronary artery disease 16-Mar-2020 13:26:54  Prateek Garcia  ST elevation MI (STEMI) 16-Mar-2020 13:26:47  Prateek Garcia

## 2020-03-16 NOTE — BRIEF OPERATIVE NOTE - NSICDXBRIEFPREOP_GEN_ALL_CORE_FT
PRE-OP DIAGNOSIS:  Triple vessel coronary artery disease 16-Mar-2020 13:26:25  Prateek Garcia  ST elevation MI (STEMI) 16-Mar-2020 13:26:02  Prateek Garcia

## 2020-03-16 NOTE — BRIEF OPERATIVE NOTE - COMMENTS
I first assisted in all major parts of the operation in the absence of a qualified surgeon, fellow, or resident physician.

## 2020-03-16 NOTE — BRIEF OPERATIVE NOTE - NSICDXBRIEFPROCEDURE_GEN_ALL_CORE_FT
PROCEDURES:  CABG, using 1 arterial graft 16-Mar-2020 13:29:50  Prateek Garcia  CABG, 1 arterial and 3 venous 16-Mar-2020 13:28:55  Prateek Garcia

## 2020-03-17 LAB
ALBUMIN SERPL ELPH-MCNC: 3.7 G/DL — SIGNIFICANT CHANGE UP (ref 3.5–5.2)
ALBUMIN SERPL ELPH-MCNC: 4.1 G/DL — SIGNIFICANT CHANGE UP (ref 3.5–5.2)
ALP SERPL-CCNC: 40 U/L — SIGNIFICANT CHANGE UP (ref 30–115)
ALP SERPL-CCNC: 41 U/L — SIGNIFICANT CHANGE UP (ref 30–115)
ALT FLD-CCNC: 7 U/L — SIGNIFICANT CHANGE UP (ref 0–41)
ALT FLD-CCNC: 7 U/L — SIGNIFICANT CHANGE UP (ref 0–41)
ANION GAP SERPL CALC-SCNC: 8 MMOL/L — SIGNIFICANT CHANGE UP (ref 7–14)
ANION GAP SERPL CALC-SCNC: 9 MMOL/L — SIGNIFICANT CHANGE UP (ref 7–14)
APTT BLD: 28.5 SEC — SIGNIFICANT CHANGE UP (ref 27–39.2)
AST SERPL-CCNC: 16 U/L — SIGNIFICANT CHANGE UP (ref 0–41)
AST SERPL-CCNC: 17 U/L — SIGNIFICANT CHANGE UP (ref 0–41)
BASE EXCESS BLDA CALC-SCNC: -2.3 MMOL/L — LOW (ref -2–2)
BASOPHILS # BLD AUTO: 0.01 K/UL — SIGNIFICANT CHANGE UP (ref 0–0.2)
BASOPHILS NFR BLD AUTO: 0.1 % — SIGNIFICANT CHANGE UP (ref 0–1)
BILIRUB SERPL-MCNC: 1.6 MG/DL — HIGH (ref 0.2–1.2)
BILIRUB SERPL-MCNC: 1.9 MG/DL — HIGH (ref 0.2–1.2)
BUN SERPL-MCNC: 11 MG/DL — SIGNIFICANT CHANGE UP (ref 10–20)
BUN SERPL-MCNC: 12 MG/DL — SIGNIFICANT CHANGE UP (ref 10–20)
CALCIUM SERPL-MCNC: 7.7 MG/DL — LOW (ref 8.5–10.1)
CALCIUM SERPL-MCNC: 8.1 MG/DL — LOW (ref 8.5–10.1)
CHLORIDE SERPL-SCNC: 104 MMOL/L — SIGNIFICANT CHANGE UP (ref 98–110)
CHLORIDE SERPL-SCNC: 108 MMOL/L — SIGNIFICANT CHANGE UP (ref 98–110)
CO2 SERPL-SCNC: 22 MMOL/L — SIGNIFICANT CHANGE UP (ref 17–32)
CO2 SERPL-SCNC: 23 MMOL/L — SIGNIFICANT CHANGE UP (ref 17–32)
CREAT SERPL-MCNC: 0.8 MG/DL — SIGNIFICANT CHANGE UP (ref 0.7–1.5)
CREAT SERPL-MCNC: 0.9 MG/DL — SIGNIFICANT CHANGE UP (ref 0.7–1.5)
EOSINOPHIL # BLD AUTO: 0.01 K/UL — SIGNIFICANT CHANGE UP (ref 0–0.7)
EOSINOPHIL NFR BLD AUTO: 0.1 % — SIGNIFICANT CHANGE UP (ref 0–8)
GAS PNL BLDA: SIGNIFICANT CHANGE UP
GLUCOSE BLDC GLUCOMTR-MCNC: 102 MG/DL — HIGH (ref 70–99)
GLUCOSE BLDC GLUCOMTR-MCNC: 104 MG/DL — HIGH (ref 70–99)
GLUCOSE BLDC GLUCOMTR-MCNC: 106 MG/DL — HIGH (ref 70–99)
GLUCOSE BLDC GLUCOMTR-MCNC: 124 MG/DL — HIGH (ref 70–99)
GLUCOSE BLDC GLUCOMTR-MCNC: 162 MG/DL — HIGH (ref 70–99)
GLUCOSE BLDC GLUCOMTR-MCNC: 206 MG/DL — HIGH (ref 70–99)
GLUCOSE BLDC GLUCOMTR-MCNC: 95 MG/DL — SIGNIFICANT CHANGE UP (ref 70–99)
GLUCOSE SERPL-MCNC: 104 MG/DL — HIGH (ref 70–99)
GLUCOSE SERPL-MCNC: 232 MG/DL — HIGH (ref 70–99)
HCO3 BLDA-SCNC: 22 MMOL/L — LOW (ref 23–27)
HCT VFR BLD CALC: 21.1 % — LOW (ref 42–52)
HCT VFR BLD CALC: 23.2 % — LOW (ref 42–52)
HGB BLD-MCNC: 7.1 G/DL — LOW (ref 14–18)
HGB BLD-MCNC: 8.1 G/DL — LOW (ref 14–18)
IMM GRANULOCYTES NFR BLD AUTO: 0.3 % — SIGNIFICANT CHANGE UP (ref 0.1–0.3)
INR BLD: 1.29 RATIO — SIGNIFICANT CHANGE UP (ref 0.65–1.3)
LYMPHOCYTES # BLD AUTO: 1.24 K/UL — SIGNIFICANT CHANGE UP (ref 1.2–3.4)
LYMPHOCYTES # BLD AUTO: 12.6 % — LOW (ref 20.5–51.1)
MAGNESIUM SERPL-MCNC: 2.2 MG/DL — SIGNIFICANT CHANGE UP (ref 1.8–2.4)
MAGNESIUM SERPL-MCNC: 2.3 MG/DL — SIGNIFICANT CHANGE UP (ref 1.8–2.4)
MCHC RBC-ENTMCNC: 32 PG — HIGH (ref 27–31)
MCHC RBC-ENTMCNC: 33.2 PG — HIGH (ref 27–31)
MCHC RBC-ENTMCNC: 33.6 G/DL — SIGNIFICANT CHANGE UP (ref 32–37)
MCHC RBC-ENTMCNC: 34.9 G/DL — SIGNIFICANT CHANGE UP (ref 32–37)
MCV RBC AUTO: 95 FL — HIGH (ref 80–94)
MCV RBC AUTO: 95.1 FL — HIGH (ref 80–94)
MONOCYTES # BLD AUTO: 0.81 K/UL — HIGH (ref 0.1–0.6)
MONOCYTES NFR BLD AUTO: 8.2 % — SIGNIFICANT CHANGE UP (ref 1.7–9.3)
NEUTROPHILS # BLD AUTO: 7.73 K/UL — HIGH (ref 1.4–6.5)
NEUTROPHILS NFR BLD AUTO: 78.7 % — HIGH (ref 42.2–75.2)
NRBC # BLD: 0 /100 WBCS — SIGNIFICANT CHANGE UP (ref 0–0)
NRBC # BLD: 0 /100 WBCS — SIGNIFICANT CHANGE UP (ref 0–0)
PCO2 BLDA: 38 MMHG — SIGNIFICANT CHANGE UP (ref 38–42)
PH BLDA: 7.38 — SIGNIFICANT CHANGE UP (ref 7.38–7.42)
PLATELET # BLD AUTO: 70 K/UL — LOW (ref 130–400)
PLATELET # BLD AUTO: 81 K/UL — LOW (ref 130–400)
PO2 BLDA: 122 MMHG — HIGH (ref 78–95)
POTASSIUM SERPL-MCNC: 3.8 MMOL/L — SIGNIFICANT CHANGE UP (ref 3.5–5)
POTASSIUM SERPL-MCNC: 4 MMOL/L — SIGNIFICANT CHANGE UP (ref 3.5–5)
POTASSIUM SERPL-SCNC: 3.8 MMOL/L — SIGNIFICANT CHANGE UP (ref 3.5–5)
POTASSIUM SERPL-SCNC: 4 MMOL/L — SIGNIFICANT CHANGE UP (ref 3.5–5)
PROT SERPL-MCNC: 4.8 G/DL — LOW (ref 6–8)
PROT SERPL-MCNC: 5.1 G/DL — LOW (ref 6–8)
PROTHROM AB SERPL-ACNC: 14.8 SEC — HIGH (ref 9.95–12.87)
RBC # BLD: 2.22 M/UL — LOW (ref 4.7–6.1)
RBC # BLD: 2.44 M/UL — LOW (ref 4.7–6.1)
RBC # FLD: 14.6 % — HIGH (ref 11.5–14.5)
RBC # FLD: 14.7 % — HIGH (ref 11.5–14.5)
SAO2 % BLDA: 99 % — HIGH (ref 94–98)
SODIUM SERPL-SCNC: 134 MMOL/L — LOW (ref 135–146)
SODIUM SERPL-SCNC: 140 MMOL/L — SIGNIFICANT CHANGE UP (ref 135–146)
WBC # BLD: 7.67 K/UL — SIGNIFICANT CHANGE UP (ref 4.8–10.8)
WBC # BLD: 9.83 K/UL — SIGNIFICANT CHANGE UP (ref 4.8–10.8)
WBC # FLD AUTO: 7.67 K/UL — SIGNIFICANT CHANGE UP (ref 4.8–10.8)
WBC # FLD AUTO: 9.83 K/UL — SIGNIFICANT CHANGE UP (ref 4.8–10.8)

## 2020-03-17 PROCEDURE — 71045 X-RAY EXAM CHEST 1 VIEW: CPT | Mod: 26

## 2020-03-17 PROCEDURE — 93010 ELECTROCARDIOGRAM REPORT: CPT

## 2020-03-17 PROCEDURE — 93010 ELECTROCARDIOGRAM REPORT: CPT | Mod: 77

## 2020-03-17 PROCEDURE — 71045 X-RAY EXAM CHEST 1 VIEW: CPT | Mod: 26,77

## 2020-03-17 PROCEDURE — 99291 CRITICAL CARE FIRST HOUR: CPT

## 2020-03-17 PROCEDURE — 70450 CT HEAD/BRAIN W/O DYE: CPT | Mod: 26

## 2020-03-17 PROCEDURE — 99222 1ST HOSP IP/OBS MODERATE 55: CPT

## 2020-03-17 PROCEDURE — 93306 TTE W/DOPPLER COMPLETE: CPT | Mod: 26

## 2020-03-17 RX ORDER — IPRATROPIUM/ALBUTEROL SULFATE 18-103MCG
3 AEROSOL WITH ADAPTER (GRAM) INHALATION EVERY 6 HOURS
Refills: 0 | Status: DISCONTINUED | OUTPATIENT
Start: 2020-03-17 | End: 2020-03-18

## 2020-03-17 RX ORDER — AMIODARONE HYDROCHLORIDE 400 MG/1
0.5 TABLET ORAL
Qty: 900 | Refills: 0 | Status: DISCONTINUED | OUTPATIENT
Start: 2020-03-17 | End: 2020-03-18

## 2020-03-17 RX ORDER — ASPIRIN/CALCIUM CARB/MAGNESIUM 324 MG
81 TABLET ORAL DAILY
Refills: 0 | Status: DISCONTINUED | OUTPATIENT
Start: 2020-03-17 | End: 2020-03-17

## 2020-03-17 RX ORDER — AMIODARONE HYDROCHLORIDE 400 MG/1
150 TABLET ORAL ONCE
Refills: 0 | Status: COMPLETED | OUTPATIENT
Start: 2020-03-17 | End: 2020-03-17

## 2020-03-17 RX ORDER — ATORVASTATIN CALCIUM 80 MG/1
80 TABLET, FILM COATED ORAL AT BEDTIME
Refills: 0 | Status: DISCONTINUED | OUTPATIENT
Start: 2020-03-17 | End: 2020-03-24

## 2020-03-17 RX ORDER — FAMOTIDINE 10 MG/ML
20 INJECTION INTRAVENOUS
Refills: 0 | Status: DISCONTINUED | OUTPATIENT
Start: 2020-03-17 | End: 2020-03-24

## 2020-03-17 RX ORDER — METOPROLOL TARTRATE 50 MG
2.5 TABLET ORAL ONCE
Refills: 0 | Status: COMPLETED | OUTPATIENT
Start: 2020-03-17 | End: 2020-03-17

## 2020-03-17 RX ORDER — AMIODARONE HYDROCHLORIDE 400 MG/1
1 TABLET ORAL
Qty: 900 | Refills: 0 | Status: DISCONTINUED | OUTPATIENT
Start: 2020-03-17 | End: 2020-03-18

## 2020-03-17 RX ORDER — ACETAMINOPHEN 500 MG
325 TABLET ORAL EVERY 4 HOURS
Refills: 0 | Status: DISCONTINUED | OUTPATIENT
Start: 2020-03-17 | End: 2020-03-24

## 2020-03-17 RX ORDER — PROCAINAMIDE HCL 500 MG
1000 TABLET, EXTENDED RELEASE ORAL ONCE
Refills: 0 | Status: COMPLETED | OUTPATIENT
Start: 2020-03-17 | End: 2020-03-17

## 2020-03-17 RX ORDER — ASPIRIN/CALCIUM CARB/MAGNESIUM 324 MG
325 TABLET ORAL DAILY
Refills: 0 | Status: DISCONTINUED | OUTPATIENT
Start: 2020-03-17 | End: 2020-03-19

## 2020-03-17 RX ORDER — POTASSIUM CHLORIDE 20 MEQ
20 PACKET (EA) ORAL ONCE
Refills: 0 | Status: COMPLETED | OUTPATIENT
Start: 2020-03-17 | End: 2020-03-17

## 2020-03-17 RX ADMIN — AMIODARONE HYDROCHLORIDE 16.7 MG/MIN: 400 TABLET ORAL at 23:06

## 2020-03-17 RX ADMIN — Medication 100 MILLIEQUIVALENT(S): at 04:40

## 2020-03-17 RX ADMIN — Medication 100 MILLIGRAM(S): at 01:31

## 2020-03-17 RX ADMIN — CHLORHEXIDINE GLUCONATE 5 MILLILITER(S): 213 SOLUTION TOPICAL at 02:25

## 2020-03-17 RX ADMIN — AMIODARONE HYDROCHLORIDE 33.3 MG/MIN: 400 TABLET ORAL at 13:35

## 2020-03-17 RX ADMIN — ATORVASTATIN CALCIUM 80 MILLIGRAM(S): 80 TABLET, FILM COATED ORAL at 21:07

## 2020-03-17 RX ADMIN — Medication 600 MILLIGRAM(S): at 05:55

## 2020-03-17 RX ADMIN — AMIODARONE HYDROCHLORIDE 600 MILLIGRAM(S): 400 TABLET ORAL at 12:55

## 2020-03-17 RX ADMIN — Medication 100 MILLIGRAM(S): at 10:30

## 2020-03-17 RX ADMIN — CHLORHEXIDINE GLUCONATE 5 MILLILITER(S): 213 SOLUTION TOPICAL at 05:58

## 2020-03-17 RX ADMIN — FAMOTIDINE 20 MILLIGRAM(S): 10 INJECTION INTRAVENOUS at 05:54

## 2020-03-17 RX ADMIN — Medication 100 GRAM(S): at 05:55

## 2020-03-17 RX ADMIN — Medication 520 MILLIGRAM(S): at 23:06

## 2020-03-17 RX ADMIN — Medication 520 MILLIGRAM(S): at 15:25

## 2020-03-17 RX ADMIN — Medication 100 GRAM(S): at 18:59

## 2020-03-17 RX ADMIN — Medication 3 MILLILITER(S): at 20:49

## 2020-03-17 RX ADMIN — Medication 2.5 MILLIGRAM(S): at 19:41

## 2020-03-17 RX ADMIN — Medication 325 MILLIGRAM(S): at 16:30

## 2020-03-17 NOTE — CHART NOTE - NSCHARTNOTEFT_GEN_A_CORE
Nurse call that patient is unresponsive. On exam I found him awake with open eye, but not answering any questions or following commands. This event was proceeded by crying spell. corneals are present, reflexes are equal and present. extremities not flacid and protect face o hand drop. Try to call neurology - awaiting respond. CT of head ordered - urgent. now after 30 minutes patient is back to his baseline.

## 2020-03-17 NOTE — ANESTHESIA FOLLOW-UP NOTE - NSEVALATIONFT_GEN_ALL_CORE
Patient extubated and doing well. Had a neurologic episode overnight thought to be seizure. VSS and in no acute distress currently. Resting comfortably in bed.

## 2020-03-17 NOTE — PROGRESS NOTE ADULT - SUBJECTIVE AND OBJECTIVE BOX
OPERATIVE PROCEDURE(s):    cabg x 4            POD # 1    SURGEON(s): florencia      SUBJECTIVE ASSESSMENT: pt is without complaints but was confused earlier today and had a period where he was unresponsive overnight.  CT scan of the head was neg for acute ischemia/infarcts.  pt  developed a. fib and was treated with amio and procan    Vital Signs Last 24 Hrs  T(C): 37.7 (17 Mar 2020 13:00), Max: 37.7 (17 Mar 2020 13:00)  T(F): 99.8 (17 Mar 2020 13:00), Max: 99.8 (17 Mar 2020 13:00)  HR: 123 (17 Mar 2020 15:30) (79 - 144)  BP: 115/59 (17 Mar 2020 13:00) (93/53 - 115/59)  BP(mean): 76 (17 Mar 2020 13:00) (72 - 76)  RR: 20 (17 Mar 2020 15:30) (1 - 26)  SpO2: 100% (17 Mar 2020 15:30) (80% - 100%)  03-16-20 @ 07:01  -  03-17-20 @ 07:00  --------------------------------------------------------  IN: 1722.5 mL / OUT: 1459 mL / NET: 263.5 mL    03-17-20 @ 07:01  -  03-17-20 @ 16:17  --------------------------------------------------------  IN: 459.9 mL / OUT: 285 mL / NET: 174.9 mL        Physical Exam  General: alert and oriented x 3  Chest: equal bs bl  CVS: s1s2  Abd: pos bs soft nt  GI/ :  Ext: no sig edema    Central Venous Catheter: Yes[ x  No[ ] , If Yes indication:           Day #    Biggs Catheter: Yes  [ x , No [ ] : If yes indication:                         Day #    NGT: Yes [ ] No [ x]     If Yes Placement:                                          Day #    Post-Op Beta-Blockers: Yes [ ], No[x], If No, then contraindication: pt on pressors    CHEST TUBE:  [ ] YES [ x NO  OUTPUT:     per 24 hours    AIR LEAKS:  [ ] YES [ ] NO      EPICARDIAL WIRES:  [ x YES [ ] NO      BOWEL MOVEMENT:  [ ] YES [x] NO          LABS:                        8.1    9.83  )-----------( 81       ( 17 Mar 2020 01:35 )             23.2     COUMADIN:   [ ] YES [ ] NO    PT/INR - ( 17 Mar 2020 01:35 )   PT: 14.80 sec;   INR: 1.29 ratio         PTT - ( 17 Mar 2020 01:35 )  PTT:28.5 sec  03-17    140  |  108  |  11  ----------------------------<  104<H>  3.8   |  23  |  0.8    Ca    7.7<L>      17 Mar 2020 01:35  Mg     2.2     03-17    TPro  5.1<L>  /  Alb  4.1  /  TBili  1.9<H>  /  DBili  x   /  AST  16  /  ALT  7   /  AlkPhos  40  03-17        MEDICATIONS  (STANDING):  albuterol/ipratropium for Nebulization 3 milliLiter(s) Nebulizer every 6 hours  aMIOdarone Infusion 1 mG/Min (33.3 mL/Hr) IV Continuous <Continuous>  aMIOdarone Infusion 0.5 mG/Min (16.7 mL/Hr) IV Continuous <Continuous>  aspirin enteric coated 325 milliGRAM(s) Oral daily  atorvastatin 80 milliGRAM(s) Oral at bedtime  ceFAZolin   IVPB 1000 milliGRAM(s) IV Intermittent every 8 hours  chlorhexidine 4% Liquid 1 Application(s) Topical daily  dextrose 50% Injectable 50 milliLiter(s) IV Push every 15 minutes  dextrose 50% Injectable 25 milliLiter(s) IV Push every 15 minutes  famotidine    Tablet 20 milliGRAM(s) Oral two times a day  guaiFENesin  milliGRAM(s) Oral every 12 hours  insulin regular Infusion 1 Unit(s)/Hr (1 mL/Hr) IV Continuous <Continuous>  magnesium sulfate  IVPB 1 Gram(s) IV Intermittent every 12 hours  meperidine     Injectable 25 milliGRAM(s) IV Push once  nitroglycerin  Infusion 15 MICROgram(s)/Min (4.5 mL/Hr) IV Continuous <Continuous>  norepinephrine Infusion 0.05 MICROgram(s)/kG/Min (8.37 mL/Hr) IV Continuous <Continuous>  polyethylene glycol 3350 17 Gram(s) Oral daily  procainamide   IVPB 1000 milliGRAM(s) IV Intermittent once  sodium chloride 0.9%. 1000 milliLiter(s) (20 mL/Hr) IV Continuous <Continuous>  vasopressin Infusion 0.04 Unit(s)/Min (2.4 mL/Hr) IV Continuous <Continuous>    MEDICATIONS  (PRN):  acetaminophen   Tablet .. 325 milliGRAM(s) Oral every 4 hours PRN Mild Pain (1 - 3)  senna 1 Tablet(s) Oral every 12 hours PRN Constipation      Allergies    No Known Allergies    Intolerances        Ambulation/Activity Status:  oob to chair      RADIOLOGY & ADDITIONAL TESTS:    EXAM:  XR CHEST PORTABLE IMMED 1V            PROCEDURE DATE:  03/17/2020            INTERPRETATION:  Clinical History / Reason for exam: Status post CABG, status post chest tube removal, evaluate for pneumothorax    Comparison : Chest radiograph 3/17/2020.    Technique/Positioning: . AP portable    Findings:    Support devices: Right IJ catheter transducer sheath. Overlying telemetry leads.    Cardiac/mediastinum/hilum: poststernotomy cardiac silhouette, partially obscured.    Lung parenchyma/Pleura: No pneumothorax. Unchanged moderate left and small right pleural effusion Skeleton/soft tissues: Unremarkable.    Impression:      Status post chest tube removal without pneumothorax    Unchanged moderate left effusion and small right effusion    Right IJ catheter transducer sheath, stable    EXAM:  CT BRAIN            PROCEDURE DATE:  03/17/2020        INTERPRETATION:  Clinical History / Reason for exam: Sudden change in mental status    Technique: Noncontrast head CT.  Contiguous unenhanced CT axial images of the head from the base to the vertex with coronal and sagittal reformats.    Comparison: 20/20    Findings:    The ventricles and cortical sulci demonstrate stable moderate atrophic changes.    There are stable patchy hypodensities throughout the hemispheric white matter without mass effect compatible with mild chronic microvascular changes.      There is no acute intracranial hemorrhage, extra-axial fluid collection or midline shift.  Gray white matter differentiation is maintained.    There is calcific atherosclerotic disease at the skull base.    There is mild right maxillary sinus mucosal thickening. The visualized paranasal sinuses and mastoids are otherwise clear.      IMPRESSION:     No evidence of acute intracranial pathology. Stable exam since 2/20/2020.      Assessment/Plan: patient is a 84 yo male s/p cabg pod # 1  cont present tx as per ct surgeon  npo for possible cardioversion  a. fib- cont amio and procan bolus  urgent echo to r/o effusion  neuro consult for post op confusion  d/c chest tubes  gi/dvt ppx  pain management    Social Service Disposition:

## 2020-03-17 NOTE — CONSULT NOTE ADULT - SUBJECTIVE AND OBJECTIVE BOX
Neurology Consult    Patient is a 85y old  Male who presents with a chief complaint of AMS.    HPI:  Pt 84 yo LH male POD#1 s/p CABG found to be confused.  Head CT negative for acute findings.  Some tremulousness reported but no tonic clonic activity.  Pt with pancreatic ca with liver mets.      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  High blood cholesterol  Heart attack: stent  H/O hernia repair  H/O heart artery stent      FAMILY HISTORY:      Social History: (-) x 3    Allergies    No Known Allergies    Intolerances        MEDICATIONS  (STANDING):  albuterol/ipratropium for Nebulization 3 milliLiter(s) Nebulizer every 6 hours  aMIOdarone Infusion 1 mG/Min (33.3 mL/Hr) IV Continuous <Continuous>  aMIOdarone Infusion 0.5 mG/Min (16.7 mL/Hr) IV Continuous <Continuous>  aspirin enteric coated 325 milliGRAM(s) Oral daily  atorvastatin 80 milliGRAM(s) Oral at bedtime  ceFAZolin   IVPB 1000 milliGRAM(s) IV Intermittent every 8 hours  chlorhexidine 4% Liquid 1 Application(s) Topical daily  dextrose 50% Injectable 50 milliLiter(s) IV Push every 15 minutes  dextrose 50% Injectable 25 milliLiter(s) IV Push every 15 minutes  famotidine    Tablet 20 milliGRAM(s) Oral two times a day  guaiFENesin  milliGRAM(s) Oral every 12 hours  insulin regular Infusion 1 Unit(s)/Hr (1 mL/Hr) IV Continuous <Continuous>  magnesium sulfate  IVPB 1 Gram(s) IV Intermittent every 12 hours  meperidine     Injectable 25 milliGRAM(s) IV Push once  nitroglycerin  Infusion 15 MICROgram(s)/Min (4.5 mL/Hr) IV Continuous <Continuous>  norepinephrine Infusion 0.05 MICROgram(s)/kG/Min (8.37 mL/Hr) IV Continuous <Continuous>  polyethylene glycol 3350 17 Gram(s) Oral daily  procainamide   IVPB 1000 milliGRAM(s) IV Intermittent once  sodium chloride 0.9%. 1000 milliLiter(s) (20 mL/Hr) IV Continuous <Continuous>  vasopressin Infusion 0.04 Unit(s)/Min (2.4 mL/Hr) IV Continuous <Continuous>    MEDICATIONS  (PRN):  acetaminophen   Tablet .. 325 milliGRAM(s) Oral every 4 hours PRN Mild Pain (1 - 3)  senna 1 Tablet(s) Oral every 12 hours PRN Constipation      Review of systems:    Constitutional: No fever, weight loss or fatigue    Eyes: No eye pain or discharge  ENMT:  No difficulty hearing; No sinus or throat pain  Neck: No pain or stiffness  Respiratory: No cough, wheezing, chills or hemoptysis  Cardiovascular: No chest pain, palpitations, shortness of breath, dyspnea on exertion  Gastrointestinal: No abdominal pain, nausea, vomiting or hematemesis; No diarrhea or constipation.   Genitourinary: No dysuria, frequency, hematuria or incontinence  Neurological: As per HPI  Skin: No rashes or lesions   Endocrine: No heat or cold intolerance; No hair loss  Musculoskeletal: No joint pain or swelling  Psychiatric: No depression, anxiety, mood swings  Heme/Lymph: No easy bruising or bleeding gums    Vital Signs Last 24 Hrs  T(C): 37.7 (17 Mar 2020 13:00), Max: 37.7 (17 Mar 2020 13:00)  T(F): 99.8 (17 Mar 2020 13:00), Max: 99.8 (17 Mar 2020 13:00)  HR: 123 (17 Mar 2020 15:30) (79 - 144)  BP: 115/59 (17 Mar 2020 13:00) (93/53 - 115/59)  BP(mean): 76 (17 Mar 2020 13:00) (72 - 76)  RR: 20 (17 Mar 2020 15:30) (1 - 26)  SpO2: 100% (17 Mar 2020 15:30) (80% - 100%)    Neurologic Examination:  General:  Appearance is consistent with chronologic age.  No abnormal facies.   General: The patient is oriented to person, place, not date.  Recent and remote memory impaired.  Fund of knowledge is impaired.  Language with normal repetition, comprehension and naming.  Nondysarthric.    Cranial nerves: intact VA, VFF.  EOMI w/o nystagmus, skew or reported double vision.  PERRL.  No ptosis/weakness of eyelid closure.  Facial sensation is normal with normal bite.  No facial asymmetry.  Hearing grossly intact b/l.  Palate elevates midline.  Tongue midline.  Motor examination:   Normal tone, bulk and range of motion.  No tenderness, twitching, tremors or involuntary movements.  Formal Muscle Strength Testing: (MRC grade R/L) 5/5 UE; 5/5 LE.  No observable drift.  Sensory examination:   Intact to light touch and pinprick, pain, in all extremities, however double simultaneous stim shows left sided neglect.  Also to double simultaneous visual stim.  Cerebellum:   FTN intact     NIH:   1a.  0  1b.  1  1c.  0  2.   0  3.  0  4.  0  5a. 0  5b. 0  6a. 0  6b. 0  7. 0  8. 0  9. 0.  10. 0.  11.  2  ____________  NIH:  3    Labs:   CBC Full  -  ( 17 Mar 2020 01:35 )  WBC Count : 9.83 K/uL  RBC Count : 2.44 M/uL  Hemoglobin : 8.1 g/dL  Hematocrit : 23.2 %  Platelet Count - Automated : 81 K/uL  Mean Cell Volume : 95.1 fL  Mean Cell Hemoglobin : 33.2 pg  Mean Cell Hemoglobin Concentration : 34.9 g/dL  Auto Neutrophil # : 7.73 K/uL  Auto Lymphocyte # : 1.24 K/uL  Auto Monocyte # : 0.81 K/uL  Auto Eosinophil # : 0.01 K/uL  Auto Basophil # : 0.01 K/uL  Auto Neutrophil % : 78.7 %  Auto Lymphocyte % : 12.6 %  Auto Monocyte % : 8.2 %  Auto Eosinophil % : 0.1 %  Auto Basophil % : 0.1 %    03-17    140  |  108  |  11  ----------------------------<  104<H>  3.8   |  23  |  0.8    Ca    7.7<L>      17 Mar 2020 01:35  Mg     2.2     03-17    TPro  5.1<L>  /  Alb  4.1  /  TBili  1.9<H>  /  DBili  x   /  AST  16  /  ALT  7   /  AlkPhos  40  03-17    LIVER FUNCTIONS - ( 17 Mar 2020 01:35 )  Alb: 4.1 g/dL / Pro: 5.1 g/dL / ALK PHOS: 40 U/L / ALT: 7 U/L / AST: 16 U/L / GGT: x           PT/INR - ( 17 Mar 2020 01:35 )   PT: 14.80 sec;   INR: 1.29 ratio         PTT - ( 17 Mar 2020 01:35 )  PTT:28.5 sec        Neuroimaging:  NCHCT: CT Head No Cont:   EXAM:  CT BRAIN            PROCEDURE DATE:  03/17/2020          INTERPRETATION:  Clinical History / Reason for exam: Sudden change in mental status    Technique: Noncontrast head CT.  Contiguous unenhanced CT axial images of the head from the base to the vertex with coronal and sagittal reformats.    Comparison: 20/20    Findings:    The ventricles and cortical sulci demonstrate stable moderate atrophic changes.    There are stable patchy hypodensities throughout the hemispheric white matter without mass effect compatible with mild chronic microvascular changes.      There is no acute intracranial hemorrhage, extra-axial fluid collection or midline shift.  Gray white matter differentiation is maintained.    There is calcific atherosclerotic disease at the skull base.    There is mild right maxillary sinus mucosal thickening. The visualized paranasal sinuses and mastoids are otherwise clear.      IMPRESSION:     No evidence of acute intracranial pathology. Stable exam since 2/20/2020.      SAMANTHA LEACH M.D., ATTENDING RADIOLOGIST  This document has been electronically signed. Mar 17 2020  9:22AM             (03-17-20 @ 09:19)    Assessment:  This is a 85y Male with h/o metastatic pancreatic cancer who is s/p CABG yesterday manifesting confusion since AM.   In addition to confusion he shows some neglect on left side to double simultaneous light touch in arms and legs and to visual stimulation.    Plan:   1.  May repeat head CT w/wo contrast in 1-2 days to r/o stroke or mass.  2.  Routine EEG.  3.  Antiplatelet therapy as permitted from post op standpoint.  4.  High intensity statin.  5.  Minimize CNS active meds to the extent possible e.g. demerol    03-17-20 @ 16:16

## 2020-03-17 NOTE — PRE-ANESTHESIA EVALUATION ADULT - NSANTHOSAYNRD_GEN_A_CORE
No. NYLA screening performed.  STOP BANG Legend: 0-2 = LOW Risk; 3-4 = INTERMEDIATE Risk; 5-8 = HIGH Risk
No. NYLA screening performed.  STOP BANG Legend: 0-2 = LOW Risk; 3-4 = INTERMEDIATE Risk; 5-8 = HIGH Risk
Yes

## 2020-03-17 NOTE — PROGRESS NOTE ADULT - SUBJECTIVE AND OBJECTIVE BOX
CTU Attending Progress Daily Note     17 Mar 2020 11:22    Procedure:       CABG                                           POD#      1             Patient seen as post-op critical care follow-up    HPI:    See preop testing chart H&P    Interval event for past 24 hr:  LOKEHS HUNT  85y had no event.     Current Complains:  LOKESH HUNT has no new complaints    REVIEW OF SYSTEMS:  CONSTITUTIONAL:  [-] weakness, [-] fevers, [-] chills  EYES/ENT: [-] visual changes, [-] vertigo, [-] throat pain   NECK: [-] pain, [-] stiffness  RESPIRATORY: [-] cough, [-] wheezing, [-] hemoptysis, [-] shortness of breath  CARDIOVASCULAR: [-] chest pain, [-] palpitations, [-] orthopnea  GASTROINTESTINAL:    [-]abdominal pain, [-] nausea, [-] vomiting, [-] hematemesis, [-] diarrhea, [-] constipation, [-] melena, [-] hematochezia.  GENITOURINARY: [-] dysuria, [-] frequency, [-] hematuria  NEUROLOGICAL: [-] numbness, [-] weakness  SKIN: [-] itching, [-] burning, [-] rashes, [-] lesions   All other review of systems is negative unless indicated above.    [  ] Unable to assess ROS because :    OBJECTIVE:  ICU Vital Signs Last 24 Hrs  T(C): 37.4 (17 Mar 2020 11:00), Max: 37.6 (17 Mar 2020 05:00)  T(F): 99.3 (17 Mar 2020 11:00), Max: 99.7 (17 Mar 2020 05:00)  HR: 87 (17 Mar 2020 11:00) (79 - 87)  BP: 93/53 (17 Mar 2020 09:30) (89/54 - 93/53)  BP(mean): 72 (17 Mar 2020 09:30) (67 - 72)  ABP: 114/46 (17 Mar 2020 11:00) (-4/-4 - 325/325)  ABP(mean): 68 (17 Mar 2020 11:00) (-4 - 325)  RR: 21 (17 Mar 2020 11:00) (0 - 23)  SpO2: 99% (17 Mar 2020 11:00) (80% - 100%)      I&O's Summary    16 Mar 2020 07:01  -  17 Mar 2020 07:00  --------------------------------------------------------  IN: 1722.5 mL / OUT: 1459 mL / NET: 263.5 mL    17 Mar 2020 07:01  -  17 Mar 2020 11:22  --------------------------------------------------------  IN: 85.8 mL / OUT: 134 mL / NET: -48.2 mL      Adult Advanced Hemodynamics Last 24 Hrs  CVP(mm Hg): 5 (17 Mar 2020 08:30) (1 - 18)  CVP(cm H2O): --  CO: 5.9 (17 Mar 2020 08:17) (4.1 - 7.1)  CI: 2.9 (17 Mar 2020 08:17) (2 - 3.4)  PA: 25/10 (17 Mar 2020 08:30) (20/8 - 43/25)  PA(mean): 15 (17 Mar 2020 08:30) (12 - 32)  PCWP: --  SVR: 866 (17 Mar 2020 08:17) (719 - 1403)  SVRI: 1763 (17 Mar 2020 08:17) (1487 - 2876)  PVR: --  PVRI: --  Mode: standby  FiO2: 40      PHYSICAL EXAM:  General: WN/WD NAD    HEENT:     [+] NCAT  [+] EOMI  [-] Conjuctival edema   [-] Icterus   [-] Thrush   [-] ETT  [-] NGT/OGT    Neck:         [+] FROM   [-] JVD     [-] Nodes     [-] Masses    [+] Mid-line trachea    [-] Tracheostomy    Chest:         [-] Sternal click   [-] Sternal drainage   [+] Pacing wires   [+] Chest tubes   [-] SubQ emphysema    Lungs:          [+] CTA   [-] Rhonchi   [-] Rales    [-] Wheezing    [-] Decreased BS    [-] Dullness R L    Cardiac:       [+] S1 [+] S2    [+] RRR   [-] Irregular   [-] S3   [-] S4    [-] Murmurs    [-] Rub    Abdomen:    [+] BS    [+] Soft    [+] Non-tender     [-] Distended    [-] Organomegaly  [-] PEG    Extremities:   [-] Cyanosis U/L   [-] Clubbing  U/L  [-] LE/UE Edema   [+] Capillary refill    [+] Pulses     Neuro:        [+] Awake   [+]  Alert   [+] intermittently Confused   [-] Lethargic   [-] Sedated   [-] Generalized Weakness    Skin:        [-] Rashes    [-] Erythema   [+] Normal incisions   [+] IV sites intact   [-] Sacral decubitus    Tubes:  LINES:    CAPILLARY BLOOD GLUCOSE  102 (17 Mar 2020 06:00)      POCT Blood Glucose.: 124 mg/dL (17 Mar 2020 11:12)    CAPILLARY BLOOD GLUCOSE  102 (17 Mar 2020 06:00)  141 (16 Mar 2020 14:15)      POCT Blood Glucose.: 124 mg/dL (17 Mar 2020 11:12)  POCT Blood Glucose.: 106 mg/dL (17 Mar 2020 04:50)  POCT Blood Glucose.: 104 mg/dL (17 Mar 2020 04:11)  POCT Blood Glucose.: 102 mg/dL (17 Mar 2020 03:14)  POCT Blood Glucose.: 106 mg/dL (16 Mar 2020 23:53)  POCT Blood Glucose.: 111 mg/dL (16 Mar 2020 20:12)  POCT Blood Glucose.: 88 mg/dL (16 Mar 2020 17:56)  POCT Blood Glucose.: 89 mg/dL (16 Mar 2020 17:26)  POCT Blood Glucose.: 119 mg/dL (16 Mar 2020 16:04)  POCT Blood Glucose.: 153 mg/dL (16 Mar 2020 15:08)      HOSPITAL MEDICATIONS:  MEDICATIONS  (STANDING):  albuterol/ipratropium for Nebulization 3 milliLiter(s) Nebulizer every 6 hours  aspirin enteric coated 325 milliGRAM(s) Oral daily  atorvastatin 80 milliGRAM(s) Oral at bedtime  ceFAZolin   IVPB 1000 milliGRAM(s) IV Intermittent every 8 hours  chlorhexidine 4% Liquid 1 Application(s) Topical daily  dextrose 50% Injectable 50 milliLiter(s) IV Push every 15 minutes  dextrose 50% Injectable 25 milliLiter(s) IV Push every 15 minutes  famotidine    Tablet 20 milliGRAM(s) Oral two times a day  guaiFENesin  milliGRAM(s) Oral every 12 hours  insulin regular Infusion 1 Unit(s)/Hr (1 mL/Hr) IV Continuous <Continuous>  magnesium sulfate  IVPB 1 Gram(s) IV Intermittent every 12 hours  meperidine     Injectable 25 milliGRAM(s) IV Push once  nitroglycerin  Infusion 15 MICROgram(s)/Min (4.5 mL/Hr) IV Continuous <Continuous>  norepinephrine Infusion 0.05 MICROgram(s)/kG/Min (8.37 mL/Hr) IV Continuous <Continuous>  polyethylene glycol 3350 17 Gram(s) Oral daily  sodium chloride 0.9%. 1000 milliLiter(s) (20 mL/Hr) IV Continuous <Continuous>  vasopressin Infusion 0.04 Unit(s)/Min (2.4 mL/Hr) IV Continuous <Continuous>    MEDICATIONS  (PRN):  acetaminophen   Tablet .. 325 milliGRAM(s) Oral every 4 hours PRN Mild Pain (1 - 3)  senna 1 Tablet(s) Oral every 12 hours PRN Constipation      LABS:  ABG - ( 17 Mar 2020 06:06 )  pH, Arterial: 7.38  pH, Blood: x     /  pCO2: 38    /  pO2: 122   / HCO3: 22    / Base Excess: -2.3  /  SaO2: 99                                      8.1    9.83  )-----------( 81       ( 17 Mar 2020 01:35 )             23.2     03-17    140  |  108  |  11  ----------------------------<  104<H>  3.8   |  23  |  0.8    Ca    7.7<L>      17 Mar 2020 01:35  Mg     2.2     03-17    TPro  5.1<L>  /  Alb  4.1  /  TBili  1.9<H>  /  DBili  x   /  AST  16  /  ALT  7   /  AlkPhos  40  03-17    PT/INR - ( 17 Mar 2020 01:35 )   PT: 14.80 sec;   INR: 1.29 ratio         PTT - ( 17 Mar 2020 01:35 )  PTT:28.5 sec        RADIOLOGY:  Reviewed and interpreted by me  CXR from 03-17-20 shows [+] mild congestion, [-] pneumothorax, [-] R/L effusion, [-] cardiomegaly,   NGT in place, S-G Catheter in place, R/L TLC in place, R/L Chest Tubes in place    < from: CT Head No Cont (03.17.20 @ 09:19) >  IMPRESSION:     No evidence of acute intracranial pathology. Stable exam since 2/20/2020.    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 Wean off supplemental oxygen as able. Daily CXR.   Cardio: Monitor telemetry/alarms. Continue cardiac meds  GI: Tolerating diet. Continue stool softeners. Continue GI prophylaxis  Renal: monitor urine output, supplement electrolytes as needed  Vasc: Heparin SC/SCDs for DVT prophylaxis  Heme: Monitor H/H.   ID: Off antibiotics. Stable.  Endocrine: Monitor finger stick blood sugar and control hyperglycemia with insulin  Physical Therapy: OOB/ambulate  Tubes: Monitor Chest tube output      Discussed with Cardiothoracic Team at AM rounds.    45 minutes of critical care time spent providing medical care for patient's acute illness/conditions that impairs at least one vital organ system and/or poses a high risk of imminent or life threatening deterioration in the patient's condition. It includes time spent evaluating and treating the patient's acute illness as well as time spent reviewing labs, radiology, discussing goals of care with patient and/or patient's family, and discussing the case with a multidisciplinary team in an effort to prevent further life threatening deterioration or end organ damage. This time is independent of any procedures performed. CTU Attending Progress Daily Note     17 Mar 2020 11:22    Procedure:       CABG                                           POD#      1             Patient seen as post-op critical care follow-up    HPI:    See preop testing chart H&P    Interval event for past 24 hr:  LOKESH HUNT  85y had hypotension on vasopressors     Current Complains:  MARILEELOKESH has no new complaints    REVIEW OF SYSTEMS:  CONSTITUTIONAL:  [-] weakness, [-] fevers, [-] chills  EYES/ENT: [-] visual changes, [-] vertigo, [-] throat pain   NECK: [-] pain, [-] stiffness  RESPIRATORY: [-] cough, [-] wheezing, [-] hemoptysis, [-] shortness of breath  CARDIOVASCULAR: [-] chest pain, [-] palpitations, [-] orthopnea  GASTROINTESTINAL:    [-]abdominal pain, [-] nausea, [-] vomiting, [-] hematemesis, [-] diarrhea, [-] constipation, [-] melena, [-] hematochezia.  GENITOURINARY: [-] dysuria, [-] frequency, [-] hematuria  NEUROLOGICAL: [-] numbness, [-] weakness  SKIN: [-] itching, [-] burning, [-] rashes, [-] lesions   All other review of systems is negative unless indicated above.    [  ] Unable to assess ROS because :    OBJECTIVE:  ICU Vital Signs Last 24 Hrs  T(C): 37.4 (17 Mar 2020 11:00), Max: 37.6 (17 Mar 2020 05:00)  T(F): 99.3 (17 Mar 2020 11:00), Max: 99.7 (17 Mar 2020 05:00)  HR: 87 (17 Mar 2020 11:00) (79 - 87)  BP: 93/53 (17 Mar 2020 09:30) (89/54 - 93/53)  BP(mean): 72 (17 Mar 2020 09:30) (67 - 72)  ABP: 114/46 (17 Mar 2020 11:00) (-4/-4 - 325/325)  ABP(mean): 68 (17 Mar 2020 11:00) (-4 - 325)  RR: 21 (17 Mar 2020 11:00) (0 - 23)  SpO2: 99% (17 Mar 2020 11:00) (80% - 100%)      I&O's Summary    16 Mar 2020 07:01  -  17 Mar 2020 07:00  --------------------------------------------------------  IN: 1722.5 mL / OUT: 1459 mL / NET: 263.5 mL    17 Mar 2020 07:01  -  17 Mar 2020 11:22  --------------------------------------------------------  IN: 85.8 mL / OUT: 134 mL / NET: -48.2 mL      Adult Advanced Hemodynamics Last 24 Hrs  CVP(mm Hg): 5 (17 Mar 2020 08:30) (1 - 18)  CVP(cm H2O): --  CO: 5.9 (17 Mar 2020 08:17) (4.1 - 7.1)  CI: 2.9 (17 Mar 2020 08:17) (2 - 3.4)  PA: 25/10 (17 Mar 2020 08:30) (20/8 - 43/25)  PA(mean): 15 (17 Mar 2020 08:30) (12 - 32)  PCWP: --  SVR: 866 (17 Mar 2020 08:17) (719 - 1403)  SVRI: 1763 (17 Mar 2020 08:17) (1487 - 2876)  PVR: --  PVRI: --  Mode: standby  FiO2: 40      PHYSICAL EXAM:  General: WN/WD NAD    HEENT:     [+] NCAT  [+] EOMI  [-] Conjuctival edema   [-] Icterus   [-] Thrush   [-] ETT  [-] NGT/OGT    Neck:         [+] FROM   [-] JVD     [-] Nodes     [-] Masses    [+] Mid-line trachea    [-] Tracheostomy    Chest:         [-] Sternal click   [-] Sternal drainage   [+] Pacing wires   [+] Chest tubes   [-] SubQ emphysema    Lungs:          [+] CTA   [-] Rhonchi   [-] Rales    [-] Wheezing    [-] Decreased BS    [-] Dullness R L    Cardiac:       [+] S1 [+] S2    [+] RRR   [-] Irregular   [-] S3   [-] S4    [-] Murmurs    [-] Rub    Abdomen:    [+] BS    [+] Soft    [+] Non-tender     [-] Distended    [-] Organomegaly  [-] PEG    Extremities:   [-] Cyanosis U/L   [-] Clubbing  U/L  [-] LE/UE Edema   [+] Capillary refill    [+] Pulses     Neuro:        [+] Awake   [+]  Alert   [+] intermittently Confused   [-] Lethargic   [-] Sedated   [-] Generalized Weakness    Skin:        [-] Rashes    [-] Erythema   [+] Normal incisions   [+] IV sites intact   [-] Sacral decubitus    Tubes:  LINES:    CAPILLARY BLOOD GLUCOSE  102 (17 Mar 2020 06:00)      POCT Blood Glucose.: 124 mg/dL (17 Mar 2020 11:12)    CAPILLARY BLOOD GLUCOSE  102 (17 Mar 2020 06:00)  141 (16 Mar 2020 14:15)      POCT Blood Glucose.: 124 mg/dL (17 Mar 2020 11:12)  POCT Blood Glucose.: 106 mg/dL (17 Mar 2020 04:50)  POCT Blood Glucose.: 104 mg/dL (17 Mar 2020 04:11)  POCT Blood Glucose.: 102 mg/dL (17 Mar 2020 03:14)  POCT Blood Glucose.: 106 mg/dL (16 Mar 2020 23:53)  POCT Blood Glucose.: 111 mg/dL (16 Mar 2020 20:12)  POCT Blood Glucose.: 88 mg/dL (16 Mar 2020 17:56)  POCT Blood Glucose.: 89 mg/dL (16 Mar 2020 17:26)  POCT Blood Glucose.: 119 mg/dL (16 Mar 2020 16:04)  POCT Blood Glucose.: 153 mg/dL (16 Mar 2020 15:08)      HOSPITAL MEDICATIONS:  MEDICATIONS  (STANDING):  albuterol/ipratropium for Nebulization 3 milliLiter(s) Nebulizer every 6 hours  aspirin enteric coated 325 milliGRAM(s) Oral daily  atorvastatin 80 milliGRAM(s) Oral at bedtime  ceFAZolin   IVPB 1000 milliGRAM(s) IV Intermittent every 8 hours  chlorhexidine 4% Liquid 1 Application(s) Topical daily  dextrose 50% Injectable 50 milliLiter(s) IV Push every 15 minutes  dextrose 50% Injectable 25 milliLiter(s) IV Push every 15 minutes  famotidine    Tablet 20 milliGRAM(s) Oral two times a day  guaiFENesin  milliGRAM(s) Oral every 12 hours  insulin regular Infusion 1 Unit(s)/Hr (1 mL/Hr) IV Continuous <Continuous>  magnesium sulfate  IVPB 1 Gram(s) IV Intermittent every 12 hours  meperidine     Injectable 25 milliGRAM(s) IV Push once  nitroglycerin  Infusion 15 MICROgram(s)/Min (4.5 mL/Hr) IV Continuous <Continuous>  norepinephrine Infusion 0.05 MICROgram(s)/kG/Min (8.37 mL/Hr) IV Continuous <Continuous>  polyethylene glycol 3350 17 Gram(s) Oral daily  sodium chloride 0.9%. 1000 milliLiter(s) (20 mL/Hr) IV Continuous <Continuous>  vasopressin Infusion 0.04 Unit(s)/Min (2.4 mL/Hr) IV Continuous <Continuous>    MEDICATIONS  (PRN):  acetaminophen   Tablet .. 325 milliGRAM(s) Oral every 4 hours PRN Mild Pain (1 - 3)  senna 1 Tablet(s) Oral every 12 hours PRN Constipation      LABS:  ABG - ( 17 Mar 2020 06:06 )  pH, Arterial: 7.38  pH, Blood: x     /  pCO2: 38    /  pO2: 122   / HCO3: 22    / Base Excess: -2.3  /  SaO2: 99                                      8.1    9.83  )-----------( 81       ( 17 Mar 2020 01:35 )             23.2     03-17    140  |  108  |  11  ----------------------------<  104<H>  3.8   |  23  |  0.8    Ca    7.7<L>      17 Mar 2020 01:35  Mg     2.2     03-17    TPro  5.1<L>  /  Alb  4.1  /  TBili  1.9<H>  /  DBili  x   /  AST  16  /  ALT  7   /  AlkPhos  40  03-17    PT/INR - ( 17 Mar 2020 01:35 )   PT: 14.80 sec;   INR: 1.29 ratio         PTT - ( 17 Mar 2020 01:35 )  PTT:28.5 sec        RADIOLOGY:  Reviewed and interpreted by me  CXR from 03-17-20 shows [+] mild congestion, [-] pneumothorax, [-] R/L effusion, [-] cardiomegaly,   NGT in place, S-G Catheter in place, R/L TLC in place, R/L Chest Tubes in place    < from: CT Head No Cont (03.17.20 @ 09:19) >  IMPRESSION:     No evidence of acute intracranial pathology. Stable exam since 2/20/2020.    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 Wean off supplemental oxygen as able. Daily CXR.   Cardio: Monitor telemetry/alarms. Continue cardiac meds  GI: Tolerating diet. Continue stool softeners. Continue GI prophylaxis  Renal: monitor urine output, supplement electrolytes as needed  Vasc: Heparin SC/SCDs for DVT prophylaxis  Heme: Monitor H/H.   ID: Off antibiotics. Stable.  Endocrine: Monitor finger stick blood sugar and control hyperglycemia with insulin  Physical Therapy: OOB/ambulate  Tubes: Monitor Chest tube output      Discussed with Cardiothoracic Team at AM rounds.    45 minutes of critical care time spent providing medical care for patient's acute illness/conditions that impairs at least one vital organ system and/or poses a high risk of imminent or life threatening deterioration in the patient's condition. It includes time spent evaluating and treating the patient's acute illness as well as time spent reviewing labs, radiology, discussing goals of care with patient and/or patient's family, and discussing the case with a multidisciplinary team in an effort to prevent further life threatening deterioration or end organ damage. This time is independent of any procedures performed.

## 2020-03-18 LAB
ANION GAP SERPL CALC-SCNC: 11 MMOL/L — SIGNIFICANT CHANGE UP (ref 7–14)
BASE EXCESS BLDA CALC-SCNC: -0.1 MMOL/L — SIGNIFICANT CHANGE UP (ref -2–2)
BASOPHILS # BLD AUTO: 0.01 K/UL — SIGNIFICANT CHANGE UP (ref 0–0.2)
BASOPHILS NFR BLD AUTO: 0.1 % — SIGNIFICANT CHANGE UP (ref 0–1)
BUN SERPL-MCNC: 11 MG/DL — SIGNIFICANT CHANGE UP (ref 10–20)
CALCIUM SERPL-MCNC: 8 MG/DL — LOW (ref 8.5–10.1)
CHLORIDE SERPL-SCNC: 103 MMOL/L — SIGNIFICANT CHANGE UP (ref 98–110)
CO2 SERPL-SCNC: 21 MMOL/L — SIGNIFICANT CHANGE UP (ref 17–32)
CREAT SERPL-MCNC: 0.7 MG/DL — SIGNIFICANT CHANGE UP (ref 0.7–1.5)
EOSINOPHIL # BLD AUTO: 0 K/UL — SIGNIFICANT CHANGE UP (ref 0–0.7)
EOSINOPHIL NFR BLD AUTO: 0 % — SIGNIFICANT CHANGE UP (ref 0–8)
GLUCOSE BLDC GLUCOMTR-MCNC: 136 MG/DL — HIGH (ref 70–99)
GLUCOSE BLDC GLUCOMTR-MCNC: 137 MG/DL — HIGH (ref 70–99)
GLUCOSE BLDC GLUCOMTR-MCNC: 150 MG/DL — HIGH (ref 70–99)
GLUCOSE BLDC GLUCOMTR-MCNC: 162 MG/DL — HIGH (ref 70–99)
GLUCOSE BLDC GLUCOMTR-MCNC: 181 MG/DL — HIGH (ref 70–99)
GLUCOSE BLDC GLUCOMTR-MCNC: 227 MG/DL — HIGH (ref 70–99)
GLUCOSE BLDC GLUCOMTR-MCNC: 70 MG/DL — SIGNIFICANT CHANGE UP (ref 70–99)
GLUCOSE SERPL-MCNC: 175 MG/DL — HIGH (ref 70–99)
HCO3 BLDA-SCNC: 24 MMOL/L — SIGNIFICANT CHANGE UP (ref 23–27)
HCT VFR BLD CALC: 27.4 % — LOW (ref 42–52)
HGB BLD-MCNC: 9.1 G/DL — LOW (ref 14–18)
IMM GRANULOCYTES NFR BLD AUTO: 0.2 % — SIGNIFICANT CHANGE UP (ref 0.1–0.3)
LYMPHOCYTES # BLD AUTO: 1.87 K/UL — SIGNIFICANT CHANGE UP (ref 1.2–3.4)
LYMPHOCYTES # BLD AUTO: 15.4 % — LOW (ref 20.5–51.1)
MAGNESIUM SERPL-MCNC: 2.5 MG/DL — HIGH (ref 1.8–2.4)
MCHC RBC-ENTMCNC: 30.3 PG — SIGNIFICANT CHANGE UP (ref 27–31)
MCHC RBC-ENTMCNC: 33.2 G/DL — SIGNIFICANT CHANGE UP (ref 32–37)
MCV RBC AUTO: 91.3 FL — SIGNIFICANT CHANGE UP (ref 80–94)
MONOCYTES # BLD AUTO: 1.39 K/UL — HIGH (ref 0.1–0.6)
MONOCYTES NFR BLD AUTO: 11.5 % — HIGH (ref 1.7–9.3)
NEUTROPHILS # BLD AUTO: 8.82 K/UL — HIGH (ref 1.4–6.5)
NEUTROPHILS NFR BLD AUTO: 72.8 % — SIGNIFICANT CHANGE UP (ref 42.2–75.2)
NRBC # BLD: 0 /100 WBCS — SIGNIFICANT CHANGE UP (ref 0–0)
PCO2 BLDA: 36 MMHG — LOW (ref 38–42)
PH BLDA: 7.43 — HIGH (ref 7.38–7.42)
PLATELET # BLD AUTO: 78 K/UL — LOW (ref 130–400)
PO2 BLDA: 102 MMHG — HIGH (ref 78–95)
POTASSIUM SERPL-MCNC: 4.2 MMOL/L — SIGNIFICANT CHANGE UP (ref 3.5–5)
POTASSIUM SERPL-SCNC: 4.2 MMOL/L — SIGNIFICANT CHANGE UP (ref 3.5–5)
RBC # BLD: 3 M/UL — LOW (ref 4.7–6.1)
RBC # FLD: 16.9 % — HIGH (ref 11.5–14.5)
SAO2 % BLDA: 99 % — HIGH (ref 94–98)
SODIUM SERPL-SCNC: 135 MMOL/L — SIGNIFICANT CHANGE UP (ref 135–146)
WBC # BLD: 12.12 K/UL — HIGH (ref 4.8–10.8)
WBC # FLD AUTO: 12.12 K/UL — HIGH (ref 4.8–10.8)

## 2020-03-18 PROCEDURE — 95816 EEG AWAKE AND DROWSY: CPT | Mod: 26

## 2020-03-18 PROCEDURE — 71045 X-RAY EXAM CHEST 1 VIEW: CPT | Mod: 26

## 2020-03-18 PROCEDURE — 99233 SBSQ HOSP IP/OBS HIGH 50: CPT

## 2020-03-18 PROCEDURE — 93010 ELECTROCARDIOGRAM REPORT: CPT

## 2020-03-18 RX ORDER — SENNA PLUS 8.6 MG/1
2 TABLET ORAL AT BEDTIME
Refills: 0 | Status: DISCONTINUED | OUTPATIENT
Start: 2020-03-18 | End: 2020-03-24

## 2020-03-18 RX ORDER — IPRATROPIUM BROMIDE 0.2 MG/ML
500 SOLUTION, NON-ORAL INHALATION EVERY 6 HOURS
Refills: 0 | Status: DISCONTINUED | OUTPATIENT
Start: 2020-03-18 | End: 2020-03-24

## 2020-03-18 RX ORDER — METOPROLOL TARTRATE 50 MG
2.5 TABLET ORAL ONCE
Refills: 0 | Status: COMPLETED | OUTPATIENT
Start: 2020-03-18 | End: 2020-03-18

## 2020-03-18 RX ORDER — SODIUM CHLORIDE 9 MG/ML
1000 INJECTION, SOLUTION INTRAVENOUS
Refills: 0 | Status: DISCONTINUED | OUTPATIENT
Start: 2020-03-18 | End: 2020-03-24

## 2020-03-18 RX ORDER — AMIODARONE HYDROCHLORIDE 400 MG/1
1 TABLET ORAL
Qty: 900 | Refills: 0 | Status: DISCONTINUED | OUTPATIENT
Start: 2020-03-18 | End: 2020-03-19

## 2020-03-18 RX ORDER — DEXTROSE 50 % IN WATER 50 %
15 SYRINGE (ML) INTRAVENOUS ONCE
Refills: 0 | Status: DISCONTINUED | OUTPATIENT
Start: 2020-03-18 | End: 2020-03-24

## 2020-03-18 RX ORDER — GLUCAGON INJECTION, SOLUTION 0.5 MG/.1ML
1 INJECTION, SOLUTION SUBCUTANEOUS ONCE
Refills: 0 | Status: DISCONTINUED | OUTPATIENT
Start: 2020-03-18 | End: 2020-03-24

## 2020-03-18 RX ORDER — DIGOXIN 250 MCG
0.5 TABLET ORAL ONCE
Refills: 0 | Status: DISCONTINUED | OUTPATIENT
Start: 2020-03-18 | End: 2020-03-18

## 2020-03-18 RX ORDER — POTASSIUM CHLORIDE 20 MEQ
20 PACKET (EA) ORAL ONCE
Refills: 0 | Status: COMPLETED | OUTPATIENT
Start: 2020-03-18 | End: 2020-03-18

## 2020-03-18 RX ORDER — FUROSEMIDE 40 MG
40 TABLET ORAL ONCE
Refills: 0 | Status: COMPLETED | OUTPATIENT
Start: 2020-03-18 | End: 2020-03-18

## 2020-03-18 RX ORDER — INSULIN LISPRO 100/ML
VIAL (ML) SUBCUTANEOUS
Refills: 0 | Status: DISCONTINUED | OUTPATIENT
Start: 2020-03-18 | End: 2020-03-24

## 2020-03-18 RX ORDER — INSULIN LISPRO 100/ML
3 VIAL (ML) SUBCUTANEOUS
Refills: 0 | Status: DISCONTINUED | OUTPATIENT
Start: 2020-03-18 | End: 2020-03-21

## 2020-03-18 RX ORDER — INSULIN GLARGINE 100 [IU]/ML
10 INJECTION, SOLUTION SUBCUTANEOUS EVERY MORNING
Refills: 0 | Status: DISCONTINUED | OUTPATIENT
Start: 2020-03-18 | End: 2020-03-21

## 2020-03-18 RX ORDER — AMIODARONE HYDROCHLORIDE 400 MG/1
150 TABLET ORAL ONCE
Refills: 0 | Status: COMPLETED | OUTPATIENT
Start: 2020-03-18 | End: 2020-03-18

## 2020-03-18 RX ORDER — WARFARIN SODIUM 2.5 MG/1
3 TABLET ORAL ONCE
Refills: 0 | Status: COMPLETED | OUTPATIENT
Start: 2020-03-18 | End: 2020-03-18

## 2020-03-18 RX ADMIN — Medication 40 MILLIGRAM(S): at 06:26

## 2020-03-18 RX ADMIN — ATORVASTATIN CALCIUM 80 MILLIGRAM(S): 80 TABLET, FILM COATED ORAL at 22:31

## 2020-03-18 RX ADMIN — Medication 100 GRAM(S): at 18:00

## 2020-03-18 RX ADMIN — Medication 3 UNIT(S): at 16:52

## 2020-03-18 RX ADMIN — WARFARIN SODIUM 3 MILLIGRAM(S): 2.5 TABLET ORAL at 22:32

## 2020-03-18 RX ADMIN — Medication 600 MILLIGRAM(S): at 18:01

## 2020-03-18 RX ADMIN — Medication 600 MILLIGRAM(S): at 06:26

## 2020-03-18 RX ADMIN — Medication 100 GRAM(S): at 06:26

## 2020-03-18 RX ADMIN — Medication 500 MICROGRAM(S): at 14:03

## 2020-03-18 RX ADMIN — CHLORHEXIDINE GLUCONATE 1 APPLICATION(S): 213 SOLUTION TOPICAL at 11:00

## 2020-03-18 RX ADMIN — SENNA PLUS 2 TABLET(S): 8.6 TABLET ORAL at 22:31

## 2020-03-18 RX ADMIN — Medication 20 MILLIEQUIVALENT(S): at 06:26

## 2020-03-18 RX ADMIN — Medication 3 MILLILITER(S): at 08:31

## 2020-03-18 RX ADMIN — Medication 2: at 16:52

## 2020-03-18 RX ADMIN — Medication 2.5 MILLIGRAM(S): at 04:25

## 2020-03-18 RX ADMIN — Medication 125 MILLIGRAM(S): at 16:52

## 2020-03-18 RX ADMIN — AMIODARONE HYDROCHLORIDE 600 MILLIGRAM(S): 400 TABLET ORAL at 09:00

## 2020-03-18 RX ADMIN — FAMOTIDINE 20 MILLIGRAM(S): 10 INJECTION INTRAVENOUS at 18:01

## 2020-03-18 RX ADMIN — FAMOTIDINE 20 MILLIGRAM(S): 10 INJECTION INTRAVENOUS at 06:26

## 2020-03-18 NOTE — PROGRESS NOTE ADULT - SUBJECTIVE AND OBJECTIVE BOX
CTU Attending Progress Daily Note     18 Mar 2020 10:18    Procedure:         CABG                                         POD#        2           Patient seen as post-op critical care follow-up    HPI:    See preop testing chart H&P    Interval event for past 24 hr:  MARILEELOKESH  85y had Afib    Current Complains:  LOKESH HUNT has no new complaints    REVIEW OF SYSTEMS:  CONSTITUTIONAL:  [-] weakness, [-] fevers, [-] chills  EYES/ENT: [-] visual changes, [-] vertigo, [-] throat pain   NECK: [-] pain, [-] stiffness  RESPIRATORY: [-] cough, [-] wheezing, [-] hemoptysis, [-] shortness of breath  CARDIOVASCULAR: [-] chest pain, [-] palpitations, [-] orthopnea  GASTROINTESTINAL:    [-]abdominal pain, [-] nausea, [-] vomiting, [-] hematemesis, [-] diarrhea, [-] constipation, [-] melena, [-] hematochezia.  GENITOURINARY: [-] dysuria, [-] frequency, [-] hematuria  NEUROLOGICAL: [-] numbness, [-] weakness  SKIN: [-] itching, [-] burning, [-] rashes, [-] lesions   All other review of systems is negative unless indicated above.    [  ] Unable to assess ROS because :    OBJECTIVE:  ICU Vital Signs Last 24 Hrs  T(C): 37.7 (18 Mar 2020 08:00), Max: 38.2 (17 Mar 2020 20:00)  T(F): 99.8 (18 Mar 2020 08:00), Max: 100.7 (17 Mar 2020 20:00)  HR: 68 (18 Mar 2020 10:00) (60 - 144)  BP: 107/70 (17 Mar 2020 20:45) (107/70 - 115/59)  BP(mean): 83 (17 Mar 2020 20:45) (76 - 83)  ABP: 112/50 (18 Mar 2020 10:00) (92/46 - 132/66)  ABP(mean): 72 (18 Mar 2020 10:00) (56 - 88)  RR: 21 (18 Mar 2020 10:00) (16 - 28)  SpO2: 100% (18 Mar 2020 10:00) (87% - 100%)      I&O's Summary    17 Mar 2020 07:01  -  18 Mar 2020 07:00  --------------------------------------------------------  IN: 3261.5 mL / OUT: 1541 mL / NET: 1720.5 mL    18 Mar 2020 07:01  -  18 Mar 2020 10:18  --------------------------------------------------------  IN: 221.9 mL / OUT: 1701 mL / NET: -1479.1 mL      PHYSICAL EXAM:  General: WN/WD NAD    HEENT:     [+] NCAT  [+] EOMI  [-] Conjuctival edema   [-] Icterus   [-] Thrush   [-] ETT  [-] NGT/OGT    Neck:         [+] FROM   [-] JVD     [-] Nodes     [-] Masses    [+] Mid-line trachea    [-] Tracheostomy    Chest:         [-] Sternal click   [-] Sternal drainage   [+] Pacing wires   [-] Chest tubes   [-] SubQ emphysema    Lungs:          [+] CTA   [-] Rhonchi   [-] Rales    [-] Wheezing    [-] Decreased BS    [-] Dullness R L    Cardiac:       [+] S1 [+] S2    [+] RRR   [-] Irregular   [-] S3   [-] S4    [-] Murmurs    [-] Rub    Abdomen:    [+] BS    [+] Soft    [+] Non-tender     [-] Distended    [-] Organomegaly  [-] PEG    Extremities:   [-] Cyanosis U/L   [-] Clubbing  U/L  [-] LE/UE Edema   [+] Capillary refill    [+] Pulses     Neuro:        [+] Awake   [+]  Alert   [-] Confused   [-] Lethargic   [-] Sedated   [-] Generalized Weakness    Skin:        [-] Rashes    [-] Erythema   [+] Normal incisions   [+] IV sites intact   [-] Sacral decubitus    Tubes:  LINES:    CAPILLARY BLOOD GLUCOSE  121 (18 Mar 2020 04:45)      POCT Blood Glucose.: 137 mg/dL (18 Mar 2020 08:58)    CAPILLARY BLOOD GLUCOSE  121 (18 Mar 2020 04:45)      POCT Blood Glucose.: 137 mg/dL (18 Mar 2020 08:58)  POCT Blood Glucose.: 70 mg/dL (18 Mar 2020 06:58)  POCT Blood Glucose.: 136 mg/dL (18 Mar 2020 04:17)  POCT Blood Glucose.: 181 mg/dL (18 Mar 2020 01:06)  POCT Blood Glucose.: 95 mg/dL (17 Mar 2020 21:00)  POCT Blood Glucose.: 162 mg/dL (17 Mar 2020 18:11)  POCT Blood Glucose.: 206 mg/dL (17 Mar 2020 15:43)  POCT Blood Glucose.: 124 mg/dL (17 Mar 2020 11:12)      HOSPITAL MEDICATIONS:  MEDICATIONS  (STANDING):  albuterol/ipratropium for Nebulization 3 milliLiter(s) Nebulizer every 6 hours  aMIOdarone Infusion 1 mG/Min (33.3 mL/Hr) IV Continuous <Continuous>  aMIOdarone Infusion 0.5 mG/Min (16.7 mL/Hr) IV Continuous <Continuous>  aspirin enteric coated 325 milliGRAM(s) Oral daily  atorvastatin 80 milliGRAM(s) Oral at bedtime  chlorhexidine 4% Liquid 1 Application(s) Topical daily  dextrose 50% Injectable 50 milliLiter(s) IV Push every 15 minutes  dextrose 50% Injectable 25 milliLiter(s) IV Push every 15 minutes  famotidine    Tablet 20 milliGRAM(s) Oral two times a day  guaiFENesin  milliGRAM(s) Oral every 12 hours  insulin regular Infusion 1 Unit(s)/Hr (1 mL/Hr) IV Continuous <Continuous>  magnesium sulfate  IVPB 1 Gram(s) IV Intermittent every 12 hours  meperidine     Injectable 25 milliGRAM(s) IV Push once  nitroglycerin  Infusion 15 MICROgram(s)/Min (4.5 mL/Hr) IV Continuous <Continuous>  norepinephrine Infusion 0.05 MICROgram(s)/kG/Min (8.37 mL/Hr) IV Continuous <Continuous>  polyethylene glycol 3350 17 Gram(s) Oral daily  sodium chloride 0.9%. 1000 milliLiter(s) (20 mL/Hr) IV Continuous <Continuous>  vasopressin Infusion 0.04 Unit(s)/Min (2.4 mL/Hr) IV Continuous <Continuous>    MEDICATIONS  (PRN):  acetaminophen   Tablet .. 325 milliGRAM(s) Oral every 4 hours PRN Mild Pain (1 - 3)  senna 1 Tablet(s) Oral every 12 hours PRN Constipation      LABS:  ABG - ( 18 Mar 2020 04:38 )  pH, Arterial: 7.43  pH, Blood: x     /  pCO2: 36    /  pO2: 102   / HCO3: 24    / Base Excess: -0.1  /  SaO2: 99                                      9.1    12.12 )-----------( 78       ( 18 Mar 2020 01:30 )             27.4     03-18    135  |  103  |  11  ----------------------------<  175<H>  4.2   |  21  |  0.7    Ca    8.0<L>      18 Mar 2020 01:30  Mg     2.5     03-18    TPro  4.8<L>  /  Alb  3.7  /  TBili  1.6<H>  /  DBili  x   /  AST  17  /  ALT  7   /  AlkPhos  41  03-17    PT/INR - ( 17 Mar 2020 01:35 )   PT: 14.80 sec;   INR: 1.29 ratio         PTT - ( 17 Mar 2020 01:35 )  PTT:28.5 sec        RADIOLOGY:  Reviewed and interpreted by me  CXR from 03-18-20 shows [+] mild congestion, [-] pneumothorax, [-] R/L effusion, [-] cardiomegaly,       ECG:  Afib 93    Assessment:  CAD SP CABG  Acute blood loss anemia  thrombocytopenia    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 Wean off supplemental oxygen as able. Daily CXR.   Cardio: Monitor telemetry/alarms. Continue cardiac meds, amio for Afib  GI: Tolerating diet. Continue stool softeners. Continue GI prophylaxis  Renal: monitor urine output, supplement electrolytes as needed  Vasc: Heparin SC/SCDs for DVT prophylaxis  Heme: Monitor H/H.   ID: Off antibiotics. Stable.  Endocrine: Monitor finger stick blood sugar and control hyperglycemia with insulin  Physical Therapy: OOB/ambulate        Discussed with Cardiothoracic Team at AM rounds. CTU Attending Progress Daily Note     18 Mar 2020 10:18    Procedure:         CABG                                         POD#        2           Patient seen as post-op critical care follow-up    HPI:    See preop testing chart H&P    Interval event for past 24 hr:  MARILEELOKESH  85y had Afib, remains on vasopressin    Current Complains:  MARILEELOKESH SMITH has no new complaints    REVIEW OF SYSTEMS:  CONSTITUTIONAL:  [-] weakness, [-] fevers, [-] chills  EYES/ENT: [-] visual changes, [-] vertigo, [-] throat pain   NECK: [-] pain, [-] stiffness  RESPIRATORY: [-] cough, [-] wheezing, [-] hemoptysis, [-] shortness of breath  CARDIOVASCULAR: [-] chest pain, [-] palpitations, [-] orthopnea  GASTROINTESTINAL:    [-]abdominal pain, [-] nausea, [-] vomiting, [-] hematemesis, [-] diarrhea, [-] constipation, [-] melena, [-] hematochezia.  GENITOURINARY: [-] dysuria, [-] frequency, [-] hematuria  NEUROLOGICAL: [-] numbness, [-] weakness  SKIN: [-] itching, [-] burning, [-] rashes, [-] lesions   All other review of systems is negative unless indicated above.    [  ] Unable to assess ROS because :    OBJECTIVE:  ICU Vital Signs Last 24 Hrs  T(C): 37.7 (18 Mar 2020 08:00), Max: 38.2 (17 Mar 2020 20:00)  T(F): 99.8 (18 Mar 2020 08:00), Max: 100.7 (17 Mar 2020 20:00)  HR: 68 (18 Mar 2020 10:00) (60 - 144)  BP: 107/70 (17 Mar 2020 20:45) (107/70 - 115/59)  BP(mean): 83 (17 Mar 2020 20:45) (76 - 83)  ABP: 112/50 (18 Mar 2020 10:00) (92/46 - 132/66)  ABP(mean): 72 (18 Mar 2020 10:00) (56 - 88)  RR: 21 (18 Mar 2020 10:00) (16 - 28)  SpO2: 100% (18 Mar 2020 10:00) (87% - 100%)      I&O's Summary    17 Mar 2020 07:01  -  18 Mar 2020 07:00  --------------------------------------------------------  IN: 3261.5 mL / OUT: 1541 mL / NET: 1720.5 mL    18 Mar 2020 07:01  -  18 Mar 2020 10:18  --------------------------------------------------------  IN: 221.9 mL / OUT: 1701 mL / NET: -1479.1 mL      PHYSICAL EXAM:  General: WN/WD NAD    HEENT:     [+] NCAT  [+] EOMI  [-] Conjuctival edema   [-] Icterus   [-] Thrush   [-] ETT  [-] NGT/OGT    Neck:         [+] FROM   [-] JVD     [-] Nodes     [-] Masses    [+] Mid-line trachea    [-] Tracheostomy    Chest:         [-] Sternal click   [-] Sternal drainage   [+] Pacing wires   [-] Chest tubes   [-] SubQ emphysema    Lungs:          [+] CTA   [-] Rhonchi   [-] Rales    [-] Wheezing    [-] Decreased BS    [-] Dullness R L    Cardiac:       [+] S1 [+] S2    [+] RRR   [-] Irregular   [-] S3   [-] S4    [-] Murmurs    [-] Rub    Abdomen:    [+] BS    [+] Soft    [+] Non-tender     [-] Distended    [-] Organomegaly  [-] PEG    Extremities:   [-] Cyanosis U/L   [-] Clubbing  U/L  [-] LE/UE Edema   [+] Capillary refill    [+] Pulses     Neuro:        [+] Awake   [+]  Alert   [-] Confused   [-] Lethargic   [-] Sedated   [-] Generalized Weakness    Skin:        [-] Rashes    [-] Erythema   [+] Normal incisions   [+] IV sites intact   [-] Sacral decubitus    Tubes:  LINES:    CAPILLARY BLOOD GLUCOSE  121 (18 Mar 2020 04:45)      POCT Blood Glucose.: 137 mg/dL (18 Mar 2020 08:58)    CAPILLARY BLOOD GLUCOSE  121 (18 Mar 2020 04:45)      POCT Blood Glucose.: 137 mg/dL (18 Mar 2020 08:58)  POCT Blood Glucose.: 70 mg/dL (18 Mar 2020 06:58)  POCT Blood Glucose.: 136 mg/dL (18 Mar 2020 04:17)  POCT Blood Glucose.: 181 mg/dL (18 Mar 2020 01:06)  POCT Blood Glucose.: 95 mg/dL (17 Mar 2020 21:00)  POCT Blood Glucose.: 162 mg/dL (17 Mar 2020 18:11)  POCT Blood Glucose.: 206 mg/dL (17 Mar 2020 15:43)  POCT Blood Glucose.: 124 mg/dL (17 Mar 2020 11:12)      HOSPITAL MEDICATIONS:  MEDICATIONS  (STANDING):  albuterol/ipratropium for Nebulization 3 milliLiter(s) Nebulizer every 6 hours  aMIOdarone Infusion 1 mG/Min (33.3 mL/Hr) IV Continuous <Continuous>  aMIOdarone Infusion 0.5 mG/Min (16.7 mL/Hr) IV Continuous <Continuous>  aspirin enteric coated 325 milliGRAM(s) Oral daily  atorvastatin 80 milliGRAM(s) Oral at bedtime  chlorhexidine 4% Liquid 1 Application(s) Topical daily  dextrose 50% Injectable 50 milliLiter(s) IV Push every 15 minutes  dextrose 50% Injectable 25 milliLiter(s) IV Push every 15 minutes  famotidine    Tablet 20 milliGRAM(s) Oral two times a day  guaiFENesin  milliGRAM(s) Oral every 12 hours  insulin regular Infusion 1 Unit(s)/Hr (1 mL/Hr) IV Continuous <Continuous>  magnesium sulfate  IVPB 1 Gram(s) IV Intermittent every 12 hours  meperidine     Injectable 25 milliGRAM(s) IV Push once  nitroglycerin  Infusion 15 MICROgram(s)/Min (4.5 mL/Hr) IV Continuous <Continuous>  norepinephrine Infusion 0.05 MICROgram(s)/kG/Min (8.37 mL/Hr) IV Continuous <Continuous>  polyethylene glycol 3350 17 Gram(s) Oral daily  sodium chloride 0.9%. 1000 milliLiter(s) (20 mL/Hr) IV Continuous <Continuous>  vasopressin Infusion 0.04 Unit(s)/Min (2.4 mL/Hr) IV Continuous <Continuous>    MEDICATIONS  (PRN):  acetaminophen   Tablet .. 325 milliGRAM(s) Oral every 4 hours PRN Mild Pain (1 - 3)  senna 1 Tablet(s) Oral every 12 hours PRN Constipation      LABS:  ABG - ( 18 Mar 2020 04:38 )  pH, Arterial: 7.43  pH, Blood: x     /  pCO2: 36    /  pO2: 102   / HCO3: 24    / Base Excess: -0.1  /  SaO2: 99                                      9.1    12.12 )-----------( 78       ( 18 Mar 2020 01:30 )             27.4     03-18    135  |  103  |  11  ----------------------------<  175<H>  4.2   |  21  |  0.7    Ca    8.0<L>      18 Mar 2020 01:30  Mg     2.5     03-18    TPro  4.8<L>  /  Alb  3.7  /  TBili  1.6<H>  /  DBili  x   /  AST  17  /  ALT  7   /  AlkPhos  41  03-17    PT/INR - ( 17 Mar 2020 01:35 )   PT: 14.80 sec;   INR: 1.29 ratio         PTT - ( 17 Mar 2020 01:35 )  PTT:28.5 sec        RADIOLOGY:  Reviewed and interpreted by me  CXR from 03-18-20 shows [+] mild congestion, [-] pneumothorax, [-] R/L effusion, [-] cardiomegaly,       ECG:  Afib 93    Assessment:  CAD SP CABG  Acute blood loss anemia  thrombocytopenia  hypotension on vasopressin    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 Wean off supplemental oxygen as able. Daily CXR.   Cardio: Monitor telemetry/alarms. Continue cardiac meds, amio for Afib  GI: Tolerating diet. Continue stool softeners. Continue GI prophylaxis  Renal: monitor urine output, supplement electrolytes as needed  Vasc: Heparin SC/SCDs for DVT prophylaxis  Heme: Monitor H/H.   ID: Off antibiotics. Stable.  Endocrine: Monitor finger stick blood sugar and control hyperglycemia with insulin  Physical Therapy: OOB/ambulate        Discussed with Cardiothoracic Team at AM rounds.

## 2020-03-18 NOTE — PACU DISCHARGE NOTE - COMMENTS
Cardioversion procedure done at bedside in CTU    Post procedure:  /62    RR 20  SpO2 100%    Two failed cardioversion attempts. Patient remains in a-fib.

## 2020-03-18 NOTE — PROGRESS NOTE ADULT - SUBJECTIVE AND OBJECTIVE BOX
OPERATIVE PROCEDURE(s):  CABG4              POD # 2                      SURGEON(s): IFEANYI Garcia  SUBJECTIVE ASSESSMENT: 85y Male patient seen and examined at bedside. Patient denies any acute complaints at this time.     Vital Signs Last 24 Hrs  T(F): 99.8 (18 Mar 2020 08:00), Max: 100.7 (17 Mar 2020 20:00)  HR: 68 (18 Mar 2020 10:00) (60 - 144)  BP: 107/70 (17 Mar 2020 20:45) (107/70 - 115/59)  BP(mean): 83 (17 Mar 2020 20:45) (76 - 83)  ABP: 112/50 (18 Mar 2020 10:00) (92/46 - 132/66)  ABP(mean): 72 (18 Mar 2020 10:00)  RR: 21 (18 Mar 2020 10:00) (16 - 28)  SpO2: 100% (18 Mar 2020 10:00) (87% - 100%)    I&O's Detail    17 Mar 2020 07:01  -  18 Mar 2020 07:00  --------------------------------------------------------  IN:    amiodarone Infusion: 366.5 mL    amiodarone Infusion: 133.4 mL    insulin regular Infusion: 41 mL    IV PiggyBack: 850 mL    norepinephrine Infusion: 217 mL    Oral Fluid: 480 mL    Packed Red Blood Cells: 642 mL    sodium chloride 0.9%.: 480 mL    vasopressin Infusion: 51.6 mL  Total IN: 3261.5 mL    OUT:    Indwelling Catheter - Urethral: 1541 mL  Total OUT: 1541 mL    Net: I&O's Detail    16 Mar 2020 07:01  -  17 Mar 2020 07:00  --------------------------------------------------------  Total NET: 263.5 mL    17 Mar 2020 07:01  -  18 Mar 2020 07:00  --------------------------------------------------------  Total NET: 1720.5 mL      CAPILLARY BLOOD GLUCOSE  121 (18 Mar 2020 04:45)  102 (17 Mar 2020 06:00)  POCT Blood Glucose.: 137 mg/dL (18 Mar 2020 08:58)  POCT Blood Glucose.: 70 mg/dL (18 Mar 2020 06:58)  POCT Blood Glucose.: 136 mg/dL (18 Mar 2020 04:17)  POCT Blood Glucose.: 181 mg/dL (18 Mar 2020 01:06)  POCT Blood Glucose.: 95 mg/dL (17 Mar 2020 21:00)  POCT Blood Glucose.: 162 mg/dL (17 Mar 2020 18:11)  POCT Blood Glucose.: 206 mg/dL (17 Mar 2020 15:43)  POCT Blood Glucose.: 124 mg/dL (17 Mar 2020 11:12)      Physical Exam:  General: NAD; A&Ox3  Cardiac: S1/S2, RRR, no murmur, no rubs  Lungs: unlabored respirations, CTA b/l, no wheeze, no rales, no crackles  Abdomen: Soft/NT/ND; positive bowel sounds x 4  Sternum: Intact, no click, incision healing well with no drainage  Incisions: Incisions clean/dry/intact  Extremities: No edema b/l lower extremities; good capillary refill; no cyanosis; palpable 1+ pedal pulses b/l    Central Venous Catheter: Yes[x]  No[] , If Yes indication:  IV Access                    Day # 2  Biggs Catheter: Yes  [x] , No  [] , If yes indication:  Monitor strict in/out              Day # 2  EPICARDIAL WIRES:  [x] YES [] NO                                                            Day # 2  BOWEL MOVEMENT:  [] YES [x] NO, If No, Timing since last BM Day #  CHEST TUBE(Left/Right):  [] YES [x] NO, If yes -  AIR LEAKS:  [] YES [] NO        LABS:                        9.1<L>  12.12<H> )-----------( 78<L>    ( 18 Mar 2020 01:30 )             27.4<L>                        7.1<L>  7.67  )-----------( 70<L>    ( 17 Mar 2020 16:14 )             21.1<L>    03-18    135  |  103  |  11  ----------------------------<  175<H>  4.2   |  21  |  0.7  03-17    134<L>  |  104  |  12  ----------------------------<  232<H>  4.0   |  22  |  0.9    Ca    8.0<L>      18 Mar 2020 01:30  Mg     2.5     03-18    TPro  4.8<L> [6.0 - 8.0]  /  Alb  3.7 [3.5 - 5.2]  /  TBili  1.6<H> [0.2 - 1.2]  /  DBili  x   /  AST  17 [0 - 41]  /  ALT  7 [0 - 41]  /  AlkPhos  41 [30 - 115]  03-17    PT/INR - ( 17 Mar 2020 01:35 )   PT: ;   INR: 1.29 ratio       PT/INR - ( 16 Mar 2020 18:00 )   PT: ;   INR: 1.45 ratio       PTT - ( 17 Mar 2020 01:35 )  PTT:28.5 sec, PTT - ( 16 Mar 2020 18:00 )  PTT:33.2 sec    ABG - ( 18 Mar 2020 04:38 )  pH: 7.43  /  pCO2: 36    /  pO2: 102   / HCO3: 24    / Base Excess: -0.1  /  SaO2: 99    /  LA: 1.3        RADIOLOGY & ADDITIONAL TESTS:  CXR: < from: Xray Chest 1 View- PORTABLE-Routine (03.18.20 @ 05:24) >  Impression:  Stable bilateral pleural effusion/opacities.  < end of copied text >    EKG: < from: 12 Lead ECG (03.18.20 @ 07:24) >  Ventricular Rate 93 BPM  Atrial Rate 108 BPM  QRS Duration 90 ms  Q-T Interval 374 ms  QTC Calculation(Bezet) 465 ms  R Axis -26 degrees  T Axis 15 degrees  Diagnosis Line Atrial fibrillation  Low voltage QRS  Inferior infarct , age undetermined  Abnormal ECG  Confirmed by Eyad Lozano ([id number]) on 3/18/2020 8:17:47 AM  < end of copied text >      Allergies  No Known Allergies  Intolerances      MEDICATIONS  (STANDING):  albuterol/ipratropium for Nebulization 3 milliLiter(s) Nebulizer every 6 hours  aMIOdarone Infusion 1 mG/Min (33.3 mL/Hr) IV Continuous <Continuous>  aMIOdarone Infusion 0.5 mG/Min (16.7 mL/Hr) IV Continuous <Continuous>  aspirin enteric coated 325 milliGRAM(s) Oral daily  atorvastatin 80 milliGRAM(s) Oral at bedtime  chlorhexidine 4% Liquid 1 Application(s) Topical daily  dextrose 50% Injectable 50 milliLiter(s) IV Push every 15 minutes  dextrose 50% Injectable 25 milliLiter(s) IV Push every 15 minutes  famotidine    Tablet 20 milliGRAM(s) Oral two times a day  guaiFENesin  milliGRAM(s) Oral every 12 hours  insulin regular Infusion 1 Unit(s)/Hr (1 mL/Hr) IV Continuous <Continuous>  magnesium sulfate  IVPB 1 Gram(s) IV Intermittent every 12 hours  meperidine     Injectable 25 milliGRAM(s) IV Push once  norepinephrine Infusion 0.05 MICROgram(s)/kG/Min (8.37 mL/Hr) IV Continuous <Continuous>  polyethylene glycol 3350 17 Gram(s) Oral daily  sodium chloride 0.9%. 1000 milliLiter(s) (20 mL/Hr) IV Continuous <Continuous>  vasopressin Infusion 0.04 Unit(s)/Min (2.4 mL/Hr) IV Continuous <Continuous>    MEDICATIONS  (PRN):  acetaminophen   Tablet .. 325 milliGRAM(s) Oral every 4 hours PRN Mild Pain (1 - 3)  senna 1 Tablet(s) Oral every 12 hours PRN Constipation      Pharmacologic DVT Prophylaxis: [] YES, [x]NO: Contraindication: thrombocytopenia                SCD's: YES b/l      Assessment/Plan:  85y Male status-post  - Case and plan discussed with CTU Intensivist and CT Surgeon - Dr. Powell/Radha/Anuradha   - Continue CTU supportive care and ongoing plan of care as per continuing CTU rounds.    - Continue DVT/GI prophylaxis  - Incentive Spirometry 10 times an hour  - Continue to advance physical activity as tolerated and continue PT/OT as directed  1. CAD: Continue ASA, statin, BB  2. HTN:   3. A. Fib:   4. COPD/Hypoxia:   5. DM/Glucose Control:     Social Service Disposition: OPERATIVE PROCEDURE(s):  CABG4              POD # 2                      SURGEON(s): IFEANYI Garcia  SUBJECTIVE ASSESSMENT: 85y Male patient seen and examined at bedside. Patient denies any acute complaints at this time. Patient remains in paroxysmal Afib, npo today for synchronized CV.    Vital Signs Last 24 Hrs  T(F): 99.8 (18 Mar 2020 08:00), Max: 100.7 (17 Mar 2020 20:00)  HR: 68 (18 Mar 2020 10:00) (60 - 144)  BP: 107/70 (17 Mar 2020 20:45) (107/70 - 115/59)  BP(mean): 83 (17 Mar 2020 20:45) (76 - 83)  ABP: 112/50 (18 Mar 2020 10:00) (92/46 - 132/66)  ABP(mean): 72 (18 Mar 2020 10:00)  RR: 21 (18 Mar 2020 10:00) (16 - 28)  SpO2: 100% (18 Mar 2020 10:00) (87% - 100%)    I&O's Detail    17 Mar 2020 07:01  -  18 Mar 2020 07:00  --------------------------------------------------------  IN:    amiodarone Infusion: 366.5 mL    amiodarone Infusion: 133.4 mL    insulin regular Infusion: 41 mL    IV PiggyBack: 850 mL    norepinephrine Infusion: 217 mL    Oral Fluid: 480 mL    Packed Red Blood Cells: 642 mL    sodium chloride 0.9%.: 480 mL    vasopressin Infusion: 51.6 mL  Total IN: 3261.5 mL    OUT:    Indwelling Catheter - Urethral: 1541 mL  Total OUT: 1541 mL    Net: I&O's Detail    16 Mar 2020 07:01  -  17 Mar 2020 07:00  --------------------------------------------------------  Total NET: 263.5 mL    17 Mar 2020 07:01  -  18 Mar 2020 07:00  --------------------------------------------------------  Total NET: 1720.5 mL      CAPILLARY BLOOD GLUCOSE  121 (18 Mar 2020 04:45)  102 (17 Mar 2020 06:00)  POCT Blood Glucose.: 137 mg/dL (18 Mar 2020 08:58)  POCT Blood Glucose.: 70 mg/dL (18 Mar 2020 06:58)  POCT Blood Glucose.: 136 mg/dL (18 Mar 2020 04:17)  POCT Blood Glucose.: 181 mg/dL (18 Mar 2020 01:06)  POCT Blood Glucose.: 95 mg/dL (17 Mar 2020 21:00)  POCT Blood Glucose.: 162 mg/dL (17 Mar 2020 18:11)  POCT Blood Glucose.: 206 mg/dL (17 Mar 2020 15:43)  POCT Blood Glucose.: 124 mg/dL (17 Mar 2020 11:12)      Physical Exam:  General: NAD; A&Ox3  Cardiac: S1/S2, RRR, no murmur, no rubs  Lungs: unlabored respirations, CTA b/l, no wheeze, no rales, no crackles  Abdomen: Soft/NT/ND; positive bowel sounds x 4  Sternum: Intact, no click, incision healing well with no drainage  Incisions: Incisions clean/dry/intact  Extremities: No edema b/l lower extremities; good capillary refill; no cyanosis; palpable 1+ pedal pulses b/l    Central Venous Catheter: Yes[x]  No[] , If Yes indication:  IV Access                    Day # 2  Biggs Catheter: Yes  [x] , No  [] , If yes indication:  Monitor strict in/out              Day # 2  EPICARDIAL WIRES:  [x] YES [] NO                                                            Day # 2  BOWEL MOVEMENT:  [] YES [x] NO, If No, Timing since last BM Day #  CHEST TUBE(Left/Right):  [] YES [x] NO, If yes -  AIR LEAKS:  [] YES [] NO        LABS:                        9.1<L>  12.12<H> )-----------( 78<L>    ( 18 Mar 2020 01:30 )             27.4<L>                        7.1<L>  7.67  )-----------( 70<L>    ( 17 Mar 2020 16:14 )             21.1<L>    03-18    135  |  103  |  11  ----------------------------<  175<H>  4.2   |  21  |  0.7  03-17    134<L>  |  104  |  12  ----------------------------<  232<H>  4.0   |  22  |  0.9    Ca    8.0<L>      18 Mar 2020 01:30  Mg     2.5     03-18    TPro  4.8<L> [6.0 - 8.0]  /  Alb  3.7 [3.5 - 5.2]  /  TBili  1.6<H> [0.2 - 1.2]  /  DBili  x   /  AST  17 [0 - 41]  /  ALT  7 [0 - 41]  /  AlkPhos  41 [30 - 115]  03-17    PT/INR - ( 17 Mar 2020 01:35 )   PT: ;   INR: 1.29 ratio       PT/INR - ( 16 Mar 2020 18:00 )   PT: ;   INR: 1.45 ratio       PTT - ( 17 Mar 2020 01:35 )  PTT:28.5 sec, PTT - ( 16 Mar 2020 18:00 )  PTT:33.2 sec    ABG - ( 18 Mar 2020 04:38 )  pH: 7.43  /  pCO2: 36    /  pO2: 102   / HCO3: 24    / Base Excess: -0.1  /  SaO2: 99    /  LA: 1.3        RADIOLOGY & ADDITIONAL TESTS:  CXR: < from: Xray Chest 1 View- PORTABLE-Routine (03.18.20 @ 05:24) >  Impression:  Stable bilateral pleural effusion/opacities.  < end of copied text >    EKG: < from: 12 Lead ECG (03.18.20 @ 07:24) >  Ventricular Rate 93 BPM  Atrial Rate 108 BPM  QRS Duration 90 ms  Q-T Interval 374 ms  QTC Calculation(Bezet) 465 ms  R Axis -26 degrees  T Axis 15 degrees  Diagnosis Line Atrial fibrillation  Low voltage QRS  Inferior infarct , age undetermined  Abnormal ECG  Confirmed by Eyad Lozano ([id number]) on 3/18/2020 8:17:47 AM  < end of copied text >      Allergies  No Known Allergies  Intolerances      MEDICATIONS  (STANDING):  albuterol/ipratropium for Nebulization 3 milliLiter(s) Nebulizer every 6 hours  aMIOdarone Infusion 1 mG/Min (33.3 mL/Hr) IV Continuous <Continuous>  aMIOdarone Infusion 0.5 mG/Min (16.7 mL/Hr) IV Continuous <Continuous>  aspirin enteric coated 325 milliGRAM(s) Oral daily  atorvastatin 80 milliGRAM(s) Oral at bedtime  chlorhexidine 4% Liquid 1 Application(s) Topical daily  dextrose 50% Injectable 50 milliLiter(s) IV Push every 15 minutes  dextrose 50% Injectable 25 milliLiter(s) IV Push every 15 minutes  famotidine    Tablet 20 milliGRAM(s) Oral two times a day  guaiFENesin  milliGRAM(s) Oral every 12 hours  insulin regular Infusion 1 Unit(s)/Hr (1 mL/Hr) IV Continuous <Continuous>  magnesium sulfate  IVPB 1 Gram(s) IV Intermittent every 12 hours  meperidine     Injectable 25 milliGRAM(s) IV Push once  norepinephrine Infusion 0.05 MICROgram(s)/kG/Min (8.37 mL/Hr) IV Continuous <Continuous>  polyethylene glycol 3350 17 Gram(s) Oral daily  sodium chloride 0.9%. 1000 milliLiter(s) (20 mL/Hr) IV Continuous <Continuous>  vasopressin Infusion 0.04 Unit(s)/Min (2.4 mL/Hr) IV Continuous <Continuous>    MEDICATIONS  (PRN):  acetaminophen   Tablet .. 325 milliGRAM(s) Oral every 4 hours PRN Mild Pain (1 - 3)  senna 1 Tablet(s) Oral every 12 hours PRN Constipation      Pharmacologic DVT Prophylaxis: [] YES, [x]NO: Contraindication: thrombocytopenia                SCD's: YES b/l      Assessment/Plan:  85y Male status-post CABG4              POD # 2  - Case and plan discussed with CTU Intensivist and CT Surgeon - Dr. Garcia  - Continue CTU supportive care and ongoing plan of care as per continuing CTU rounds.    - Continue DVT/GI prophylaxis  - Incentive Spirometry 10 times an hour  - Continue to advance physical activity as tolerated and continue PT/OT as directed  1. CAD: Continue ASA 81 and statin. BB held for symptomatic hypotension requiring multiple vasopressors.  2. A. Fib: Cont amiodarone gtt and planned for CV today.

## 2020-03-19 LAB
ANION GAP SERPL CALC-SCNC: 10 MMOL/L — SIGNIFICANT CHANGE UP (ref 7–14)
ANION GAP SERPL CALC-SCNC: 11 MMOL/L — SIGNIFICANT CHANGE UP (ref 7–14)
BASOPHILS # BLD AUTO: 0 K/UL — SIGNIFICANT CHANGE UP (ref 0–0.2)
BASOPHILS NFR BLD AUTO: 0 % — SIGNIFICANT CHANGE UP (ref 0–1)
BUN SERPL-MCNC: 15 MG/DL — SIGNIFICANT CHANGE UP (ref 10–20)
BUN SERPL-MCNC: 17 MG/DL — SIGNIFICANT CHANGE UP (ref 10–20)
CALCIUM SERPL-MCNC: 8.2 MG/DL — LOW (ref 8.5–10.1)
CALCIUM SERPL-MCNC: 8.7 MG/DL — SIGNIFICANT CHANGE UP (ref 8.5–10.1)
CHLORIDE SERPL-SCNC: 100 MMOL/L — SIGNIFICANT CHANGE UP (ref 98–110)
CHLORIDE SERPL-SCNC: 99 MMOL/L — SIGNIFICANT CHANGE UP (ref 98–110)
CO2 SERPL-SCNC: 24 MMOL/L — SIGNIFICANT CHANGE UP (ref 17–32)
CO2 SERPL-SCNC: 29 MMOL/L — SIGNIFICANT CHANGE UP (ref 17–32)
CREAT SERPL-MCNC: 0.9 MG/DL — SIGNIFICANT CHANGE UP (ref 0.7–1.5)
CREAT SERPL-MCNC: 1 MG/DL — SIGNIFICANT CHANGE UP (ref 0.7–1.5)
EOSINOPHIL # BLD AUTO: 0 K/UL — SIGNIFICANT CHANGE UP (ref 0–0.7)
EOSINOPHIL NFR BLD AUTO: 0 % — SIGNIFICANT CHANGE UP (ref 0–8)
GLUCOSE BLDC GLUCOMTR-MCNC: 111 MG/DL — HIGH (ref 70–99)
GLUCOSE BLDC GLUCOMTR-MCNC: 168 MG/DL — HIGH (ref 70–99)
GLUCOSE BLDC GLUCOMTR-MCNC: 169 MG/DL — HIGH (ref 70–99)
GLUCOSE BLDC GLUCOMTR-MCNC: 176 MG/DL — HIGH (ref 70–99)
GLUCOSE BLDC GLUCOMTR-MCNC: 181 MG/DL — HIGH (ref 70–99)
GLUCOSE SERPL-MCNC: 100 MG/DL — HIGH (ref 70–99)
GLUCOSE SERPL-MCNC: 177 MG/DL — HIGH (ref 70–99)
HCT VFR BLD CALC: 25 % — LOW (ref 42–52)
HGB BLD-MCNC: 8.7 G/DL — LOW (ref 14–18)
IMM GRANULOCYTES NFR BLD AUTO: 0.4 % — HIGH (ref 0.1–0.3)
INR BLD: 1.24 RATIO — SIGNIFICANT CHANGE UP (ref 0.65–1.3)
LYMPHOCYTES # BLD AUTO: 0.88 K/UL — LOW (ref 1.2–3.4)
LYMPHOCYTES # BLD AUTO: 11.1 % — LOW (ref 20.5–51.1)
MAGNESIUM SERPL-MCNC: 2.6 MG/DL — HIGH (ref 1.8–2.4)
MAGNESIUM SERPL-MCNC: 2.6 MG/DL — HIGH (ref 1.8–2.4)
MCHC RBC-ENTMCNC: 31.9 PG — HIGH (ref 27–31)
MCHC RBC-ENTMCNC: 34.8 G/DL — SIGNIFICANT CHANGE UP (ref 32–37)
MCV RBC AUTO: 91.6 FL — SIGNIFICANT CHANGE UP (ref 80–94)
MONOCYTES # BLD AUTO: 0.52 K/UL — SIGNIFICANT CHANGE UP (ref 0.1–0.6)
MONOCYTES NFR BLD AUTO: 6.5 % — SIGNIFICANT CHANGE UP (ref 1.7–9.3)
NEUTROPHILS # BLD AUTO: 6.52 K/UL — HIGH (ref 1.4–6.5)
NEUTROPHILS NFR BLD AUTO: 82 % — HIGH (ref 42.2–75.2)
NRBC # BLD: 0 /100 WBCS — SIGNIFICANT CHANGE UP (ref 0–0)
PLATELET # BLD AUTO: 71 K/UL — LOW (ref 130–400)
POTASSIUM SERPL-MCNC: 3.9 MMOL/L — SIGNIFICANT CHANGE UP (ref 3.5–5)
POTASSIUM SERPL-MCNC: 4 MMOL/L — SIGNIFICANT CHANGE UP (ref 3.5–5)
POTASSIUM SERPL-SCNC: 3.9 MMOL/L — SIGNIFICANT CHANGE UP (ref 3.5–5)
POTASSIUM SERPL-SCNC: 4 MMOL/L — SIGNIFICANT CHANGE UP (ref 3.5–5)
PROTHROM AB SERPL-ACNC: 14.3 SEC — HIGH (ref 9.95–12.87)
RBC # BLD: 2.73 M/UL — LOW (ref 4.7–6.1)
RBC # FLD: 17 % — HIGH (ref 11.5–14.5)
SODIUM SERPL-SCNC: 134 MMOL/L — LOW (ref 135–146)
SODIUM SERPL-SCNC: 139 MMOL/L — SIGNIFICANT CHANGE UP (ref 135–146)
WBC # BLD: 7.95 K/UL — SIGNIFICANT CHANGE UP (ref 4.8–10.8)
WBC # FLD AUTO: 7.95 K/UL — SIGNIFICANT CHANGE UP (ref 4.8–10.8)

## 2020-03-19 PROCEDURE — 71045 X-RAY EXAM CHEST 1 VIEW: CPT | Mod: 26

## 2020-03-19 PROCEDURE — 99233 SBSQ HOSP IP/OBS HIGH 50: CPT

## 2020-03-19 PROCEDURE — 93010 ELECTROCARDIOGRAM REPORT: CPT

## 2020-03-19 PROCEDURE — 99222 1ST HOSP IP/OBS MODERATE 55: CPT

## 2020-03-19 PROCEDURE — 93010 ELECTROCARDIOGRAM REPORT: CPT | Mod: 77

## 2020-03-19 RX ORDER — ASPIRIN/CALCIUM CARB/MAGNESIUM 324 MG
81 TABLET ORAL DAILY
Refills: 0 | Status: DISCONTINUED | OUTPATIENT
Start: 2020-03-19 | End: 2020-03-21

## 2020-03-19 RX ORDER — AMIODARONE HYDROCHLORIDE 400 MG/1
0.5 TABLET ORAL
Qty: 900 | Refills: 0 | Status: DISCONTINUED | OUTPATIENT
Start: 2020-03-19 | End: 2020-03-20

## 2020-03-19 RX ORDER — POTASSIUM CHLORIDE 20 MEQ
40 PACKET (EA) ORAL ONCE
Refills: 0 | Status: COMPLETED | OUTPATIENT
Start: 2020-03-19 | End: 2020-03-19

## 2020-03-19 RX ORDER — METOPROLOL TARTRATE 50 MG
12.5 TABLET ORAL EVERY 12 HOURS
Refills: 0 | Status: DISCONTINUED | OUTPATIENT
Start: 2020-03-19 | End: 2020-03-20

## 2020-03-19 RX ORDER — FUROSEMIDE 40 MG
40 TABLET ORAL ONCE
Refills: 0 | Status: COMPLETED | OUTPATIENT
Start: 2020-03-19 | End: 2020-03-19

## 2020-03-19 RX ORDER — METOPROLOL TARTRATE 50 MG
2.5 TABLET ORAL ONCE
Refills: 0 | Status: COMPLETED | OUTPATIENT
Start: 2020-03-19 | End: 2020-03-19

## 2020-03-19 RX ORDER — WARFARIN SODIUM 2.5 MG/1
3 TABLET ORAL ONCE
Refills: 0 | Status: COMPLETED | OUTPATIENT
Start: 2020-03-19 | End: 2020-03-19

## 2020-03-19 RX ADMIN — Medication 12.5 MILLIGRAM(S): at 17:08

## 2020-03-19 RX ADMIN — Medication 40 MILLIEQUIVALENT(S): at 10:03

## 2020-03-19 RX ADMIN — INSULIN GLARGINE 10 UNIT(S): 100 INJECTION, SOLUTION SUBCUTANEOUS at 08:00

## 2020-03-19 RX ADMIN — AMIODARONE HYDROCHLORIDE 16.7 MG/MIN: 400 TABLET ORAL at 17:13

## 2020-03-19 RX ADMIN — CHLORHEXIDINE GLUCONATE 1 APPLICATION(S): 213 SOLUTION TOPICAL at 11:32

## 2020-03-19 RX ADMIN — Medication 3 UNIT(S): at 08:00

## 2020-03-19 RX ADMIN — FAMOTIDINE 20 MILLIGRAM(S): 10 INJECTION INTRAVENOUS at 05:43

## 2020-03-19 RX ADMIN — Medication 40 MILLIGRAM(S): at 10:03

## 2020-03-19 RX ADMIN — Medication 100 GRAM(S): at 05:42

## 2020-03-19 RX ADMIN — Medication 500 MICROGRAM(S): at 08:46

## 2020-03-19 RX ADMIN — Medication 2: at 07:00

## 2020-03-19 RX ADMIN — POLYETHYLENE GLYCOL 3350 17 GRAM(S): 17 POWDER, FOR SOLUTION ORAL at 11:31

## 2020-03-19 RX ADMIN — Medication 12.5 MILLIGRAM(S): at 11:37

## 2020-03-19 RX ADMIN — ATORVASTATIN CALCIUM 80 MILLIGRAM(S): 80 TABLET, FILM COATED ORAL at 21:24

## 2020-03-19 RX ADMIN — Medication 60 MILLIGRAM(S): at 21:25

## 2020-03-19 RX ADMIN — FAMOTIDINE 20 MILLIGRAM(S): 10 INJECTION INTRAVENOUS at 17:09

## 2020-03-19 RX ADMIN — SENNA PLUS 2 TABLET(S): 8.6 TABLET ORAL at 21:24

## 2020-03-19 RX ADMIN — Medication 3 UNIT(S): at 11:30

## 2020-03-19 RX ADMIN — Medication 500 MICROGRAM(S): at 14:17

## 2020-03-19 RX ADMIN — Medication 2.5 MILLIGRAM(S): at 11:15

## 2020-03-19 RX ADMIN — Medication 81 MILLIGRAM(S): at 11:31

## 2020-03-19 RX ADMIN — Medication 600 MILLIGRAM(S): at 05:43

## 2020-03-19 RX ADMIN — Medication 600 MILLIGRAM(S): at 17:09

## 2020-03-19 RX ADMIN — WARFARIN SODIUM 3 MILLIGRAM(S): 2.5 TABLET ORAL at 21:25

## 2020-03-19 RX ADMIN — Medication 60 MILLIGRAM(S): at 16:16

## 2020-03-19 RX ADMIN — Medication 3 UNIT(S): at 16:16

## 2020-03-19 RX ADMIN — Medication 100 GRAM(S): at 17:13

## 2020-03-19 RX ADMIN — Medication 2: at 11:30

## 2020-03-19 NOTE — PHYSICAL THERAPY INITIAL EVALUATION ADULT - GENERAL OBSERVATIONS, REHAB EVAL
chart reviewed - pt not medically appropriate currently as per RN Daniel - PT will follow up
chart reviewed - spoke to RN pt currently medically not appropriate for PT intervention - PT will follow up
pt encountered sitting in chair in NAD, agreeable to PT, nurse bedside helping, + telemetry, pulse ox, tucker, A line

## 2020-03-19 NOTE — ANESTHESIA FOLLOW-UP NOTE - NSEVALATIONFT_GEN_ALL_CORE
Patient sitting in chair watching TV. Regular rhythm on cardiac monitor with baseline artifact. Amiodarone infusion continues.

## 2020-03-19 NOTE — CONSULT NOTE ADULT - ASSESSMENT
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

## 2020-03-19 NOTE — PROGRESS NOTE ADULT - SUBJECTIVE AND OBJECTIVE BOX
OPERATIVE PROCEDURE(s):   CABG4    POD # 3                      SURGEON(s): IFEANYI Garcia  SUBJECTIVE ASSESSMENT: 85y Male patient seen and examined at bedside.    Vital Signs Last 24 Hrs  T(F): 98 (19 Mar 2020 12:00), Max: 100 (18 Mar 2020 16:00)  HR: 102 (19 Mar 2020 13:00) (59 - 125)  BP: 126/64 (19 Mar 2020 07:55) (93/57 - 126/64)  BP(mean): 87 (19 Mar 2020 07:55) (70 - 87)  ABP: 102/49 (19 Mar 2020 13:00) (92/43 - 148/76)  ABP(mean): 65 (19 Mar 2020 13:00)  RR: 18 (19 Mar 2020 12:00) (15 - 25)  SpO2: 96% (19 Mar 2020 13:00) (92% - 100%)    I&O's Detail    18 Mar 2020 07:01  -  19 Mar 2020 07:00  --------------------------------------------------------  IN:    amiodarone Infusion: 133.3 mL    amiodarone Infusion: 233.1 mL    amiodarone Infusion: 333 mL    IV PiggyBack: 200 mL    norepinephrine Infusion: 46.4 mL    Oral Fluid: 480 mL    sodium chloride 0.9%.: 480 mL    vasopressin Infusion: 36 mL  Total IN: 1941.8 mL    OUT:    Indwelling Catheter - Urethral: 2530 mL  Total OUT: 2530 mL    Net: I&O's Detail    17 Mar 2020 07:01  -  18 Mar 2020 07:00  --------------------------------------------------------  Total NET: 1720.5 mL    18 Mar 2020 07:01  -  19 Mar 2020 07:00  --------------------------------------------------------  Total NET: -588.2 mL      CAPILLARY BLOOD GLUCOSE  POCT Blood Glucose.: 181 mg/dL (19 Mar 2020 11:28)  POCT Blood Glucose.: 176 mg/dL (19 Mar 2020 06:57)  POCT Blood Glucose.: 227 mg/dL (18 Mar 2020 22:39)  POCT Blood Glucose.: 162 mg/dL (18 Mar 2020 16:07)      Physical Exam:  General: NAD; A&Ox3  Cardiac: S1/S2, RRR, no murmur, no rubs  Lungs: unlabored respirations, CTA b/l, no wheeze, no rales, no crackles  Abdomen: Soft/NT/ND; positive bowel sounds x 4  Sternum: Intact, no click, incision healing well with no drainage  Incisions: Incisions clean/dry/intact  Extremities: No edema b/l lower extremities; good capillary refill; no cyanosis; palpable 1+ pedal pulses b/l    Central Venous Catheter: Yes[x]  No[] , If Yes indication:  IV Access            Day # 3  Biggs Catheter: Yes  [x] , No  [] , If yes indication: Monitor strict in/out        Day # 3  EPICARDIAL WIRES:  [x] YES [] NO                                                            Day # 3  BOWEL MOVEMENT:  [] YES [x] NO, If No, Timing since last BM Day #        LABS:                        8.7<L>  7.95  )-----------( 71<L>    ( 19 Mar 2020 02:24 )             25.0<L>                        9.1<L>  12.12<H> )-----------( 78<L>    ( 18 Mar 2020 01:30 )             27.4<L>    03-19    134<L>  |  100  |  15  ----------------------------<  177<H>  4.0   |  24  |  0.9  03-18    135  |  103  |  11  ----------------------------<  175<H>  4.2   |  21  |  0.7    Ca    8.2<L>      19 Mar 2020 02:24  Mg     2.6     03-19    TPro  4.8<L> [6.0 - 8.0]  /  Alb  3.7 [3.5 - 5.2]  /  TBili  1.6<H> [0.2 - 1.2]  /  DBili  x   /  AST  17 [0 - 41]  /  ALT  7 [0 - 41]  /  AlkPhos  41 [30 - 115]  03-17    PT/INR - ( 19 Mar 2020 02:24 )   PT: ;   INR: 1.24 ratio      ABG - ( 19 Mar 2020 04:28 )  pH: 7.50  /  pCO2: 33    /  pO2: 72    / HCO3: 26    / Base Excess: 2.5   /  SaO2: 97    /  LA: 1.4        RADIOLOGY & ADDITIONAL TESTS:  CXR: < from: Xray Chest 1 View- PORTABLE-Routine (03.19.20 @ 05:47) >  Impression:   Bilateral pleural effusions no change. No air leak.  < end of copied text >    EKG: < from: 12 Lead ECG (03.19.20 @ 07:58) >  Ventricular Rate 71 BPM  Atrial Rate 71 BPM  P-R Interval 218 ms  QRS Duration 90 ms  Q-T Interval 424 ms  QTC Calculation(Bezet) 460 ms  P Axis 42 degrees  R Axis -6 degrees  T Axis 1 degrees  Diagnosis Line Sinus rhythm with 1stdegree A-V block  Low voltage QRS  Inferior infarct , age undetermined  Cannot rule out Anterior infarct , age undetermined  Abnormal ECG  Confirmed by Bobby Duval ([id number]) on 3/19/2020 9:19:06 AM  < end of copied text >      Allergies  No Known Allergies  Intolerances      MEDICATIONS  (STANDING):  aMIOdarone Infusion 0.5 mG/Min (16.7 mL/Hr) IV Continuous <Continuous>  aspirin enteric coated 81 milliGRAM(s) Oral daily  atorvastatin 80 milliGRAM(s) Oral at bedtime  chlorhexidine 4% Liquid 1 Application(s) Topical daily  dextrose 5%. 1000 milliLiter(s) (50 mL/Hr) IV Continuous <Continuous>  dextrose 50% Injectable 50 milliLiter(s) IV Push every 15 minutes  dextrose 50% Injectable 25 milliLiter(s) IV Push every 15 minutes  famotidine    Tablet 20 milliGRAM(s) Oral two times a day  guaiFENesin  milliGRAM(s) Oral every 12 hours  insulin glargine Injectable (LANTUS) 10 Unit(s) SubCutaneous every morning  insulin lispro (HumaLOG) corrective regimen sliding scale   SubCutaneous three times a day before meals  insulin lispro Injectable (HumaLOG) 3 Unit(s) SubCutaneous three times a day before meals  ipratropium    for Nebulization 500 MICROGram(s) Nebulizer every 6 hours  magnesium sulfate  IVPB 1 Gram(s) IV Intermittent every 12 hours  metoprolol tartrate 12.5 milliGRAM(s) Oral every 12 hours  polyethylene glycol 3350 17 Gram(s) Oral daily  senna 2 Tablet(s) Oral at bedtime  sodium chloride 0.9%. 1000 milliLiter(s) (20 mL/Hr) IV Continuous <Continuous>  warfarin 3 milliGRAM(s) Oral once    MEDICATIONS  (PRN):  acetaminophen   Tablet .. 325 milliGRAM(s) Oral every 4 hours PRN Mild Pain (1 - 3)  dextrose 40% Gel 15 Gram(s) Oral once PRN Blood Glucose LESS THAN 70 milliGRAM(s)/deciliter  glucagon  Injectable 1 milliGRAM(s) IntraMuscular once PRN Glucose LESS THAN 70 milligrams/deciliter      Pharmacologic DVT Prophylaxis: [] YES, [x]NO: Contraindication: thrombocytopenia          SCD's: YES b/l    Assessment/Plan:  85y Male status-post CABG4              POD # 3  - Case and plan discussed with CTU Intensivist and CT Surgeon - Dr. Garcia  - Continue CTU supportive care and ongoing plan of care as per continuing CTU rounds.    - Continue DVT/GI prophylaxis  - Incentive Spirometry 10 times an hour  - Continue to advance physical activity as tolerated and continue PT/OT as directed  1. CAD: Continue ASA 81, statin and BB   2. A. Fib: Cont amiodarone gtt.

## 2020-03-19 NOTE — CONSULT NOTE ADULT - SUBJECTIVE AND OBJECTIVE BOX
Patient is a 85y old  Male who presents with a chief complaint of cabg x 4 (17 Mar 2020 16:16)        HPI:      Electrophysiology:  85y Male with h/o pancreatic and liver cancer in remission since 40 years ago presented originally in 2020 with fever and chills, at that time was found to be in AF RVR, converted to NSR rhythm spontaneously was positive for PNA, EKG revealed inferior STEMI, patient had LHC at that time and RCA PCI of culprit lesion. LHC revealed extensive CAD of LM, LAD, RPDA, and RI. Patient underwent elective 3v CABG, and post op developed AF RVR.   Received Amio bolus x2 and drip, Procainamide, unsuccessful DCCV x2.   Pt was on pressors until last night      REVIEW OF SYSTEMS    [x ] A ten-point review of systems was otherwise negative except as noted.  [ ] Due to altered mental status/intubation, subjective information were not able to be obtained from the patient. History was obtained, to the extent possible, from review of the chart and collateral sources of information.      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 Left ventricular ejection fraction, by visual estimation, is 50 to 55%.   2. Spectral Doppler showsimpaired relaxation pattern of left ventricular myocardial filling (Grade I diastolic dysfunction).   3. Mild mitral valve regurgitation.   4. Mild aortic regurgitation.   5. There is moderate aortic root calcification.  < end of copied text >    < from: Transthoracic Echocardiogram (03.10.20 @ 07:46) >  Summary:   1. Moderately decreased global left ventricular systolic function.   2. RCA distribution, entire inferior septum, basal and mid inferolateral wall, and apex are abnormal as described above.   3. LV Ejection Fraction by House's Method with a biplane EF of 38 %.   4. The left ventricular diastolic function could not be assessed in this study.   5. Mild mitral valve regurgitation.   6. Mild aortic regurgitation.   7. Sclerotic aortic valve with normal opening.   8. LA volume Index is 32.7 ml/m² ml/m2.   9. There is mild aortic root calcification.  < end of copied text >    < from: Transthoracic Echocardiogram (20 @ 17:08) >  Summary:   1. Severely decreased global left ventricular systolic function.   2. LV Ejection Fraction by House's Method with a biplane EF of 31 %.   3. Mildly increased left ventricular internal cavity size.   4. No segmental wall motion evaluation due to poor quality images.   5. Mild aortic regurgitation.   6. Sclerotic aortic valve with normal opening.   7. The aortic root is mildly dilated at the level of the Sinus of Valsalva to 4.3 cm in diameter.   8. LA volume Index is 21.3 ml/m² ml/m2.   9. There is mild aortic root calcification.  < end of copied text >    STRESS  FINDINGS:    CATHETERIZATION  FINDINGS:  LEFT HEART CATHETERIZATION                                    Left Main: distal LM 40% tubular lesion  LAD: 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, mid LAD 80% tubular stenosis at origin of D1, distal LAD luminal irregularities                 Diag: D1 luminal irregularities  Left 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, mid circ no disease, distal circ no disease   Right Coronary Artery: prox RCA mild disease, mid RCA moderate disease, distal % occlusion ZEFERINO 0 flow  seen after achieving reperfusion  RPDA: thrombus/significant stenosis 95% at the branch point of RPDA from distal RCA ZEFERINO 3 flow, rest of vessel no disease  RPL: no disease  Ramus Intermed: no disease  DOMINANCE: Right  ACCESS: Right femoral artery  CLOSURE: Perclose   INTERVENTION  IMPLANTS: Synergy 3.0x12mm to distal % lesion      CATH SUMMARY/FINDINGS:  Coronary circulation: The coronary circulation is right dominant. There was significant left main disease. There was 2-vessel coronary artery disease (LAD and RCA). Left main: Angiography showed a single discrete lesion. Distal left main: There was a tubular 70 % stenosis. LAD: The vessel was heavily calcified. Angiography showed multiple discrete lesions. Proximal LAD: There was a diffuse 70 % stenosis. Mid LAD: There was a tubular 85 % stenosis. Distal vessel angiography showed minor luminal irregularities. Circumflex: Angiography showed minor luminal irregularities with no flow limiting lesions. Proximal circumflex: The vessel had an excessively ectatic focal segment. Ramus intermedius: Angiography showed minor luminal irregularities with no flow limiting lesions. Proximal RCA: Angiography showed moderate atherosclerosis with no clinical lesions appreciated. Mid RCA: Angiography showed minor luminal irregularities with no flow limiting lesions. Distal RCA: Angiography showed patent stent. Right PDA: Angiography showed a single discrete lesion. There was a tubular 95 % stenosis at the ostium of the vessel segment.   Lesion intervention: A percutaneous intervention was performed on the lesion in the left main. There was no dissection.   Intravascular ultrasound was performed.     POST-OP DIAGNOSIS  LM disease, 2V disease (RCA and LAD)    ELECTROPHYSIOLOGY STUDY  FINDINGS:    CAROTID ULTRASOUND:  FINDINGS  < from: VA Duplex Carotid, Bilat (20 @ 17:47) >  Impression:    20-39% stenosis of the right internal carotid artery.    20-39% stenosis of the left internal carotid artery.    < end of copied text >    VENOUS DUPLEX SCAN:  FINDINGS:    CHEST CT PULMONARY ANGIO with IV Contrast:  FINDINGS:    FAMILY HISTORY:  not contributory    SOCIAL HISTORY:    CIGARETTES: former smoker d/c     ALCOHOL: denies      MEDICATIONS  (STANDING):  aMIOdarone Infusion 0.5 mG/Min (16.7 mL/Hr) IV Continuous <Continuous>  aspirin enteric coated 81 milliGRAM(s) Oral daily  atorvastatin 80 milliGRAM(s) Oral at bedtime  chlorhexidine 4% Liquid 1 Application(s) Topical daily  dextrose 5%. 1000 milliLiter(s) (50 mL/Hr) IV Continuous <Continuous>  dextrose 50% Injectable 50 milliLiter(s) IV Push every 15 minutes  dextrose 50% Injectable 25 milliLiter(s) IV Push every 15 minutes  famotidine    Tablet 20 milliGRAM(s) Oral two times a day  guaiFENesin  milliGRAM(s) Oral every 12 hours  insulin glargine Injectable (LANTUS) 10 Unit(s) SubCutaneous every morning  insulin lispro (HumaLOG) corrective regimen sliding scale   SubCutaneous three times a day before meals  insulin lispro Injectable (HumaLOG) 3 Unit(s) SubCutaneous three times a day before meals  ipratropium    for Nebulization 500 MICROGram(s) Nebulizer every 6 hours  magnesium sulfate  IVPB 1 Gram(s) IV Intermittent every 12 hours  methylPREDNISolone sodium succinate Injectable 60 milliGRAM(s) IV Push every 8 hours  metoprolol tartrate 12.5 milliGRAM(s) Oral every 12 hours  polyethylene glycol 3350 17 Gram(s) Oral daily  senna 2 Tablet(s) Oral at bedtime  sodium chloride 0.9%. 1000 milliLiter(s) (20 mL/Hr) IV Continuous <Continuous>  warfarin 3 milliGRAM(s) Oral once    MEDICATIONS  (PRN):  acetaminophen   Tablet .. 325 milliGRAM(s) Oral every 4 hours PRN Mild Pain (1 - 3)  dextrose 40% Gel 15 Gram(s) Oral once PRN Blood Glucose LESS THAN 70 milliGRAM(s)/deciliter  glucagon  Injectable 1 milliGRAM(s) IntraMuscular once PRN Glucose LESS THAN 70 milligrams/deciliter      Vital Signs Last 24 Hrs  T(C): 36.7 (19 Mar 2020 12:00), Max: 37.3 (18 Mar 2020 20:00)  T(F): 98 (19 Mar 2020 12:00), Max: 99.2 (18 Mar 2020 20:00)  HR: 112 (19 Mar 2020 16:00) (59 - 125)  BP: 126/64 (19 Mar 2020 07:55) (126/64 - 126/64)  BP(mean): 87 (19 Mar 2020 07:55) (87 - 87)  RR: 18 (19 Mar 2020 16:00) (15 - 25)  SpO2: 96% (19 Mar 2020 16:00) (82% - 100%)    PHYSICAL EXAM:    GENERAL: In no apparent distress, well nourished, and hydrated.  HEAD:  Atraumatic, Normocephalic  EYES: EOMI, PERRLA, conjunctiva and sclera clear  NECK: Supple and normal thyroid.  No JVD or carotid bruit.  Carotid pulse is 2+ bilaterally.  HEART: IRRegular rate and rhythm; No murmurs, rubs, or gallops.  midline incision site c/d/i  PULMONARY: Clear to auscultation and perfusion.  No rales, wheezing, or rhonchi bilaterally.  ABDOMEN: Soft, Nontender, Nondistended; Bowel sounds present  EXTREMITIES:  2+ Peripheral Pulses, No clubbing, cyanosis, or edema  NEUROLOGICAL: Grossly nonfocal      INTERPRETATION OF TELEMETRY:    ECG:    I&O's Detail    18 Mar 2020 07:01  -  19 Mar 2020 07:00  --------------------------------------------------------  IN:    amiodarone Infusion: 133.3 mL    amiodarone Infusion: 233.1 mL    amiodarone Infusion: 333 mL    IV PiggyBack: 200 mL    norepinephrine Infusion: 46.4 mL    Oral Fluid: 480 mL    sodium chloride 0.9%.: 480 mL    vasopressin Infusion: 36 mL  Total IN: 1941.8 mL    OUT:    Indwelling Catheter - Urethral: 2530 mL  Total OUT: 2530 mL    Total NET: -588.2 mL      19 Mar 2020 07:01  -  19 Mar 2020 16:51  --------------------------------------------------------  IN:    amiodarone Infusion: 66.6 mL    amiodarone Infusion: 4 mL    Oral Fluid: 720 mL  Total IN: 790.6 mL    OUT:    Indwelling Catheter - Urethral: 2198 mL  Total OUT: 2198 mL    Total NET: -1407.4 mL          LABS:                        8.7    7.95  )-----------( 71       ( 19 Mar 2020 02:24 )             25.0     03-    139  |  99  |  17  ----------------------------<  100<H>  3.9   |  29  |  1.0    Ca    8.7      19 Mar 2020 13:50  Mg     2.6           Thyroid Stimulating Hormone, Serum: 2.96 uIU/mL (20 @ 04:44)        PT/INR - ( 19 Mar 2020 02:24 )   PT: 14.30 sec;   INR: 1.24 ratio             BNP      I&O's Detail    18 Mar 2020 07:01  -  19 Mar 2020 07:00  --------------------------------------------------------  IN:    amiodarone Infusion: 133.3 mL    amiodarone Infusion: 233.1 mL    amiodarone Infusion: 333 mL    IV PiggyBack: 200 mL    norepinephrine Infusion: 46.4 mL    Oral Fluid: 480 mL    sodium chloride 0.9%.: 480 mL    vasopressin Infusion: 36 mL  Total IN: 1941.8 mL    OUT:    Indwelling Catheter - Urethral: 2530 mL  Total OUT: 2530 mL    Total NET: -588.2 mL      19 Mar 2020 07:01  -  19 Mar 2020 16:51  --------------------------------------------------------  IN:    amiodarone Infusion: 66.6 mL    amiodarone Infusion: 4 mL    Oral Fluid: 720 mL  Total IN: 790.6 mL    OUT:    Indwelling Catheter - Urethral: 2198 mL  Total OUT: 2198 mL    Total NET: -1407.4 mL        Daily     Daily Weight in k.3 (19 Mar 2020 05:00)    RADIOLOGY & ADDITIONAL STUDIES:

## 2020-03-19 NOTE — PHYSICAL THERAPY INITIAL EVALUATION ADULT - PERTINENT HX OF CURRENT PROBLEM, REHAB EVAL
PATIENT CURRENTLY DENIES CHEST PAIN  SHORTNESS OF BREATH  PALPITATIONS,  DYSURIA, OR UPPER RESPIRATORY INFECTION IN PAST 2 WEEKS

## 2020-03-19 NOTE — PROGRESS NOTE ADULT - SUBJECTIVE AND OBJECTIVE BOX
CTU Attending Progress Daily Note     19 Mar 2020 09:30    Procedure:              CABG                                    POD#         7          Patient seen as post-op critical care follow-up    HPI:    See preop testing chart H&P    Interval event for past 24 hr:  MARILEELOKESH  85y had Afib, required vasopressors     Current Complains:  MARILEELOKESH has no new complaints    REVIEW OF SYSTEMS:  CONSTITUTIONAL:  [-] weakness, [-] fevers, [-] chills  EYES/ENT: [-] visual changes, [-] vertigo, [-] throat pain   NECK: [-] pain, [-] stiffness  RESPIRATORY: [-] cough, [-] wheezing, [-] hemoptysis, [-] shortness of breath  CARDIOVASCULAR: [-] chest pain, [-] palpitations, [-] orthopnea  GASTROINTESTINAL:    [-]abdominal pain, [-] nausea, [-] vomiting, [-] hematemesis, [-] diarrhea, [-] constipation, [-] melena, [-] hematochezia.  GENITOURINARY: [-] dysuria, [-] frequency, [-] hematuria  NEUROLOGICAL: [-] numbness, [-] weakness  SKIN: [-] itching, [-] burning, [-] rashes, [-] lesions   All other review of systems is negative unless indicated above.    [  ] Unable to assess ROS because :    OBJECTIVE:  ICU Vital Signs Last 24 Hrs  T(C): 36.7 (19 Mar 2020 07:55), Max: 37.8 (18 Mar 2020 12:00)  T(F): 98.1 (19 Mar 2020 07:55), Max: 100.1 (18 Mar 2020 12:00)  HR: 69 (19 Mar 2020 09:00) (59 - 122)  BP: 126/64 (19 Mar 2020 07:55) (88/55 - 126/64)  BP(mean): 87 (19 Mar 2020 07:55) (66 - 87)  ABP: 121/45 (19 Mar 2020 09:00) (86/47 - 148/76)  ABP(mean): 71 (19 Mar 2020 09:00) (61 - 113)  RR: 16 (19 Mar 2020 07:55) (15 - 25)  SpO2: 97% (19 Mar 2020 09:00) (94% - 100%)      I&O's Summary    18 Mar 2020 07:01  -  19 Mar 2020 07:00  --------------------------------------------------------  IN: 1941.8 mL / OUT: 2530 mL / NET: -588.2 mL    19 Mar 2020 07:01  -  19 Mar 2020 09:30  --------------------------------------------------------  IN: 306.6 mL / OUT: 107 mL / NET: 199.6 mL      PHYSICAL EXAM:  General: WN/WD NAD    HEENT:     [+] NCAT  [+] EOMI  [-] Conjuctival edema   [-] Icterus   [-] Thrush   [-] ETT  [-] NGT/OGT    Neck:         [+] FROM   [-] JVD     [-] Nodes     [-] Masses    [+] Mid-line trachea    [-] Tracheostomy    Chest:         [-] Sternal click   [-] Sternal drainage   [+] Pacing wires   [-] Chest tubes   [-] SubQ emphysema    Lungs:          [+] CTA   [-] Rhonchi   [-] Rales    [-] Wheezing    [-] Decreased BS    [-] Dullness R L    Cardiac:       [+] S1 [+] S2    [+] RRR   [-] Irregular   [-] S3   [-] S4    [-] Murmurs    [-] Rub    Abdomen:    [+] BS    [+] Soft    [+] Non-tender     [-] Distended    [-] Organomegaly  [-] PEG    Extremities:   [-] Cyanosis U/L   [-] Clubbing  U/L  [-] LE/UE Edema   [+] Capillary refill    [+] Pulses     Neuro:        [+] Awake   [+]  Alert   [-] Confused   [-] Lethargic   [-] Sedated   [-] Generalized Weakness    Skin:        [-] Rashes    [-] Erythema   [+] Normal incisions   [+] IV sites intact   [-] Sacral decubitus    Tubes:  LINES:    CAPILLARY BLOOD GLUCOSE  121 (18 Mar 2020 04:45)      POCT Blood Glucose.: 176 mg/dL (19 Mar 2020 06:57)    CAPILLARY BLOOD GLUCOSE      POCT Blood Glucose.: 176 mg/dL (19 Mar 2020 06:57)  POCT Blood Glucose.: 227 mg/dL (18 Mar 2020 22:39)  POCT Blood Glucose.: 162 mg/dL (18 Mar 2020 16:07)  POCT Blood Glucose.: 150 mg/dL (18 Mar 2020 11:25)      HOSPITAL MEDICATIONS:  MEDICATIONS  (STANDING):  aMIOdarone Infusion 1 mG/Min (33.3 mL/Hr) IV Continuous <Continuous>  aspirin enteric coated 325 milliGRAM(s) Oral daily  atorvastatin 80 milliGRAM(s) Oral at bedtime  chlorhexidine 4% Liquid 1 Application(s) Topical daily  dextrose 5%. 1000 milliLiter(s) (50 mL/Hr) IV Continuous <Continuous>  dextrose 50% Injectable 50 milliLiter(s) IV Push every 15 minutes  dextrose 50% Injectable 25 milliLiter(s) IV Push every 15 minutes  famotidine    Tablet 20 milliGRAM(s) Oral two times a day  guaiFENesin  milliGRAM(s) Oral every 12 hours  insulin glargine Injectable (LANTUS) 10 Unit(s) SubCutaneous every morning  insulin lispro (HumaLOG) corrective regimen sliding scale   SubCutaneous three times a day before meals  insulin lispro Injectable (HumaLOG) 3 Unit(s) SubCutaneous three times a day before meals  ipratropium    for Nebulization 500 MICROGram(s) Nebulizer every 6 hours  magnesium sulfate  IVPB 1 Gram(s) IV Intermittent every 12 hours  meperidine     Injectable 25 milliGRAM(s) IV Push once  nitroglycerin  Infusion 15 MICROgram(s)/Min (4.5 mL/Hr) IV Continuous <Continuous>  norepinephrine Infusion 0.05 MICROgram(s)/kG/Min (8.37 mL/Hr) IV Continuous <Continuous>  polyethylene glycol 3350 17 Gram(s) Oral daily  senna 2 Tablet(s) Oral at bedtime  sodium chloride 0.9%. 1000 milliLiter(s) (20 mL/Hr) IV Continuous <Continuous>  vasopressin Infusion 0.04 Unit(s)/Min (2.4 mL/Hr) IV Continuous <Continuous>    MEDICATIONS  (PRN):  acetaminophen   Tablet .. 325 milliGRAM(s) Oral every 4 hours PRN Mild Pain (1 - 3)  dextrose 40% Gel 15 Gram(s) Oral once PRN Blood Glucose LESS THAN 70 milliGRAM(s)/deciliter  glucagon  Injectable 1 milliGRAM(s) IntraMuscular once PRN Glucose LESS THAN 70 milligrams/deciliter      LABS:  ABG - ( 19 Mar 2020 04:28 )  pH, Arterial: 7.50  pH, Blood: x     /  pCO2: 33    /  pO2: 72    / HCO3: 26    / Base Excess: 2.5   /  SaO2: 97                                      8.7    7.95  )-----------( 71       ( 19 Mar 2020 02:24 )             25.0     03-19    134<L>  |  100  |  15  ----------------------------<  177<H>  4.0   |  24  |  0.9    Ca    8.2<L>      19 Mar 2020 02:24  Mg     2.6     03-19    TPro  4.8<L>  /  Alb  3.7  /  TBili  1.6<H>  /  DBili  x   /  AST  17  /  ALT  7   /  AlkPhos  41  03-17    PT/INR - ( 19 Mar 2020 02:24 )   PT: 14.30 sec;   INR: 1.24 ratio                 RADIOLOGY:  Reviewed and interpreted by me  CXR from 03-19-20 shows [+] mild congestion, [-] pneumothorax, [-] R/L effusion, [-] cardiomegaly,       ECG:  Reviewed and interpreted by me:   QTC:    Assessment:  CAD SP CABG  Afib  acute blood loss anemia  Thrombocytopenia  hypotension, now off pressors    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 Wean off supplemental oxygen as able. Daily CXR.   Cardio: Monitor telemetry/alarms. Continue cardiac meds  GI: Tolerating diet. Continue stool softeners. Continue GI prophylaxis  Renal: monitor urine output, supplement electrolytes as needed  Vasc: SCDs for DVT prophylaxis. not on Heparin SC due to thrombocytopenia  Heme: Monitor CBC  ID: Off antibiotics. Stable.  Endocrine: Monitor finger stick blood sugar and control hyperglycemia with insulin  Physical Therapy: OOB/ambulate        Discussed with Cardiothoracic Team at AM rounds.

## 2020-03-20 ENCOUNTER — TRANSCRIPTION ENCOUNTER (OUTPATIENT)
Age: 85
End: 2020-03-20

## 2020-03-20 LAB
ALBUMIN SERPL ELPH-MCNC: 3.6 G/DL — SIGNIFICANT CHANGE UP (ref 3.5–5.2)
ALBUMIN SERPL ELPH-MCNC: 4 G/DL — SIGNIFICANT CHANGE UP (ref 3.5–5.2)
ALP SERPL-CCNC: 78 U/L — SIGNIFICANT CHANGE UP (ref 30–115)
ALP SERPL-CCNC: 82 U/L — SIGNIFICANT CHANGE UP (ref 30–115)
ALT FLD-CCNC: 36 U/L — SIGNIFICANT CHANGE UP (ref 0–41)
ALT FLD-CCNC: 41 U/L — SIGNIFICANT CHANGE UP (ref 0–41)
AMMONIA BLD-MCNC: 34 UMOL/L — SIGNIFICANT CHANGE UP (ref 11–55)
ANION GAP SERPL CALC-SCNC: 12 MMOL/L — SIGNIFICANT CHANGE UP (ref 7–14)
ANION GAP SERPL CALC-SCNC: 13 MMOL/L — SIGNIFICANT CHANGE UP (ref 7–14)
AST SERPL-CCNC: 52 U/L — HIGH (ref 0–41)
AST SERPL-CCNC: 54 U/L — HIGH (ref 0–41)
BASOPHILS # BLD AUTO: 0.01 K/UL — SIGNIFICANT CHANGE UP (ref 0–0.2)
BASOPHILS NFR BLD AUTO: 0.1 % — SIGNIFICANT CHANGE UP (ref 0–1)
BILIRUB DIRECT SERPL-MCNC: 0.6 MG/DL — HIGH (ref 0–0.2)
BILIRUB INDIRECT FLD-MCNC: 1.5 MG/DL — HIGH (ref 0.2–1.2)
BILIRUB SERPL-MCNC: 1.9 MG/DL — HIGH (ref 0.2–1.2)
BILIRUB SERPL-MCNC: 2.1 MG/DL — HIGH (ref 0.2–1.2)
BUN SERPL-MCNC: 21 MG/DL — HIGH (ref 10–20)
BUN SERPL-MCNC: 23 MG/DL — HIGH (ref 10–20)
CALCIUM SERPL-MCNC: 8.4 MG/DL — LOW (ref 8.5–10.1)
CALCIUM SERPL-MCNC: 8.7 MG/DL — SIGNIFICANT CHANGE UP (ref 8.5–10.1)
CHLORIDE SERPL-SCNC: 100 MMOL/L — SIGNIFICANT CHANGE UP (ref 98–110)
CHLORIDE SERPL-SCNC: 101 MMOL/L — SIGNIFICANT CHANGE UP (ref 98–110)
CO2 SERPL-SCNC: 22 MMOL/L — SIGNIFICANT CHANGE UP (ref 17–32)
CO2 SERPL-SCNC: 24 MMOL/L — SIGNIFICANT CHANGE UP (ref 17–32)
CREAT SERPL-MCNC: 0.9 MG/DL — SIGNIFICANT CHANGE UP (ref 0.7–1.5)
CREAT SERPL-MCNC: 0.9 MG/DL — SIGNIFICANT CHANGE UP (ref 0.7–1.5)
EOSINOPHIL # BLD AUTO: 0 K/UL — SIGNIFICANT CHANGE UP (ref 0–0.7)
EOSINOPHIL NFR BLD AUTO: 0 % — SIGNIFICANT CHANGE UP (ref 0–8)
GAS PNL BLDA: SIGNIFICANT CHANGE UP
GLUCOSE BLDC GLUCOMTR-MCNC: 101 MG/DL — HIGH (ref 70–99)
GLUCOSE BLDC GLUCOMTR-MCNC: 117 MG/DL — HIGH (ref 70–99)
GLUCOSE BLDC GLUCOMTR-MCNC: 122 MG/DL — HIGH (ref 70–99)
GLUCOSE BLDC GLUCOMTR-MCNC: 176 MG/DL — HIGH (ref 70–99)
GLUCOSE SERPL-MCNC: 124 MG/DL — HIGH (ref 70–99)
GLUCOSE SERPL-MCNC: 136 MG/DL — HIGH (ref 70–99)
HCT VFR BLD CALC: 26.3 % — LOW (ref 42–52)
HCT VFR BLD CALC: 27.7 % — LOW (ref 42–52)
HGB BLD-MCNC: 8.6 G/DL — LOW (ref 14–18)
HGB BLD-MCNC: 9.6 G/DL — LOW (ref 14–18)
IMM GRANULOCYTES NFR BLD AUTO: 0.5 % — HIGH (ref 0.1–0.3)
INR BLD: 1.39 RATIO — HIGH (ref 0.65–1.3)
LYMPHOCYTES # BLD AUTO: 0.97 K/UL — LOW (ref 1.2–3.4)
LYMPHOCYTES # BLD AUTO: 10.2 % — LOW (ref 20.5–51.1)
MAGNESIUM SERPL-MCNC: 2.7 MG/DL — HIGH (ref 1.8–2.4)
MAGNESIUM SERPL-MCNC: 2.7 MG/DL — HIGH (ref 1.8–2.4)
MCHC RBC-ENTMCNC: 30 PG — SIGNIFICANT CHANGE UP (ref 27–31)
MCHC RBC-ENTMCNC: 32 PG — HIGH (ref 27–31)
MCHC RBC-ENTMCNC: 32.7 G/DL — SIGNIFICANT CHANGE UP (ref 32–37)
MCHC RBC-ENTMCNC: 34.7 G/DL — SIGNIFICANT CHANGE UP (ref 32–37)
MCV RBC AUTO: 91.6 FL — SIGNIFICANT CHANGE UP (ref 80–94)
MCV RBC AUTO: 92.3 FL — SIGNIFICANT CHANGE UP (ref 80–94)
MONOCYTES # BLD AUTO: 0.34 K/UL — SIGNIFICANT CHANGE UP (ref 0.1–0.6)
MONOCYTES NFR BLD AUTO: 3.6 % — SIGNIFICANT CHANGE UP (ref 1.7–9.3)
NEUTROPHILS # BLD AUTO: 8.15 K/UL — HIGH (ref 1.4–6.5)
NEUTROPHILS NFR BLD AUTO: 85.6 % — HIGH (ref 42.2–75.2)
NRBC # BLD: 0 /100 WBCS — SIGNIFICANT CHANGE UP (ref 0–0)
NRBC # BLD: 0 /100 WBCS — SIGNIFICANT CHANGE UP (ref 0–0)
PLATELET # BLD AUTO: 144 K/UL — SIGNIFICANT CHANGE UP (ref 130–400)
PLATELET # BLD AUTO: 99 K/UL — LOW (ref 130–400)
POTASSIUM SERPL-MCNC: 3.9 MMOL/L — SIGNIFICANT CHANGE UP (ref 3.5–5)
POTASSIUM SERPL-MCNC: 4.2 MMOL/L — SIGNIFICANT CHANGE UP (ref 3.5–5)
POTASSIUM SERPL-SCNC: 3.9 MMOL/L — SIGNIFICANT CHANGE UP (ref 3.5–5)
POTASSIUM SERPL-SCNC: 4.2 MMOL/L — SIGNIFICANT CHANGE UP (ref 3.5–5)
PROT SERPL-MCNC: 5.3 G/DL — LOW (ref 6–8)
PROT SERPL-MCNC: 6 G/DL — SIGNIFICANT CHANGE UP (ref 6–8)
PROTHROM AB SERPL-ACNC: 16 SEC — HIGH (ref 9.95–12.87)
RBC # BLD: 2.87 M/UL — LOW (ref 4.7–6.1)
RBC # BLD: 3 M/UL — LOW (ref 4.7–6.1)
RBC # FLD: 16.2 % — HIGH (ref 11.5–14.5)
RBC # FLD: 16.3 % — HIGH (ref 11.5–14.5)
SODIUM SERPL-SCNC: 135 MMOL/L — SIGNIFICANT CHANGE UP (ref 135–146)
SODIUM SERPL-SCNC: 137 MMOL/L — SIGNIFICANT CHANGE UP (ref 135–146)
T3 SERPL-MCNC: 63 NG/DL — LOW (ref 80–200)
T4 AB SER-ACNC: 6.9 UG/DL — SIGNIFICANT CHANGE UP (ref 4.6–12)
TSH SERPL-MCNC: 1.1 UIU/ML — SIGNIFICANT CHANGE UP (ref 0.27–4.2)
WBC # BLD: 12.82 K/UL — HIGH (ref 4.8–10.8)
WBC # BLD: 9.52 K/UL — SIGNIFICANT CHANGE UP (ref 4.8–10.8)
WBC # FLD AUTO: 12.82 K/UL — HIGH (ref 4.8–10.8)
WBC # FLD AUTO: 9.52 K/UL — SIGNIFICANT CHANGE UP (ref 4.8–10.8)

## 2020-03-20 PROCEDURE — 71045 X-RAY EXAM CHEST 1 VIEW: CPT | Mod: 26

## 2020-03-20 PROCEDURE — 99233 SBSQ HOSP IP/OBS HIGH 50: CPT

## 2020-03-20 PROCEDURE — 99232 SBSQ HOSP IP/OBS MODERATE 35: CPT

## 2020-03-20 PROCEDURE — 93010 ELECTROCARDIOGRAM REPORT: CPT

## 2020-03-20 PROCEDURE — 93306 TTE W/DOPPLER COMPLETE: CPT | Mod: 26

## 2020-03-20 RX ORDER — HEPARIN SODIUM 5000 [USP'U]/ML
5000 INJECTION INTRAVENOUS; SUBCUTANEOUS EVERY 8 HOURS
Refills: 0 | Status: DISCONTINUED | OUTPATIENT
Start: 2020-03-20 | End: 2020-03-21

## 2020-03-20 RX ORDER — AMIODARONE HYDROCHLORIDE 400 MG/1
200 TABLET ORAL DAILY
Refills: 0 | Status: DISCONTINUED | OUTPATIENT
Start: 2020-03-22 | End: 2020-03-22

## 2020-03-20 RX ORDER — CLOPIDOGREL BISULFATE 75 MG/1
75 TABLET, FILM COATED ORAL DAILY
Refills: 0 | Status: DISCONTINUED | OUTPATIENT
Start: 2020-03-20 | End: 2020-03-24

## 2020-03-20 RX ORDER — AMIODARONE HYDROCHLORIDE 400 MG/1
400 TABLET ORAL EVERY 12 HOURS
Refills: 0 | Status: COMPLETED | OUTPATIENT
Start: 2020-03-20 | End: 2020-03-21

## 2020-03-20 RX ORDER — METOPROLOL TARTRATE 50 MG
12.5 TABLET ORAL EVERY 8 HOURS
Refills: 0 | Status: DISCONTINUED | OUTPATIENT
Start: 2020-03-20 | End: 2020-03-24

## 2020-03-20 RX ORDER — DIGOXIN 250 MCG
0.5 TABLET ORAL ONCE
Refills: 0 | Status: COMPLETED | OUTPATIENT
Start: 2020-03-20 | End: 2020-03-20

## 2020-03-20 RX ORDER — WARFARIN SODIUM 2.5 MG/1
1 TABLET ORAL ONCE
Refills: 0 | Status: COMPLETED | OUTPATIENT
Start: 2020-03-20 | End: 2020-03-20

## 2020-03-20 RX ADMIN — SENNA PLUS 2 TABLET(S): 8.6 TABLET ORAL at 21:25

## 2020-03-20 RX ADMIN — Medication 0.5 MILLIGRAM(S): at 12:56

## 2020-03-20 RX ADMIN — Medication 500 MICROGRAM(S): at 20:29

## 2020-03-20 RX ADMIN — Medication 81 MILLIGRAM(S): at 12:17

## 2020-03-20 RX ADMIN — Medication 600 MILLIGRAM(S): at 19:02

## 2020-03-20 RX ADMIN — ATORVASTATIN CALCIUM 80 MILLIGRAM(S): 80 TABLET, FILM COATED ORAL at 21:24

## 2020-03-20 RX ADMIN — Medication 2: at 08:51

## 2020-03-20 RX ADMIN — INSULIN GLARGINE 10 UNIT(S): 100 INJECTION, SOLUTION SUBCUTANEOUS at 08:50

## 2020-03-20 RX ADMIN — FAMOTIDINE 20 MILLIGRAM(S): 10 INJECTION INTRAVENOUS at 19:02

## 2020-03-20 RX ADMIN — Medication 3 UNIT(S): at 08:50

## 2020-03-20 RX ADMIN — POLYETHYLENE GLYCOL 3350 17 GRAM(S): 17 POWDER, FOR SOLUTION ORAL at 12:19

## 2020-03-20 RX ADMIN — Medication 3 UNIT(S): at 12:17

## 2020-03-20 RX ADMIN — CHLORHEXIDINE GLUCONATE 1 APPLICATION(S): 213 SOLUTION TOPICAL at 12:19

## 2020-03-20 RX ADMIN — WARFARIN SODIUM 1 MILLIGRAM(S): 2.5 TABLET ORAL at 21:26

## 2020-03-20 RX ADMIN — AMIODARONE HYDROCHLORIDE 400 MILLIGRAM(S): 400 TABLET ORAL at 19:02

## 2020-03-20 RX ADMIN — CLOPIDOGREL BISULFATE 75 MILLIGRAM(S): 75 TABLET, FILM COATED ORAL at 12:18

## 2020-03-20 RX ADMIN — Medication 12.5 MILLIGRAM(S): at 14:59

## 2020-03-20 RX ADMIN — Medication 100 GRAM(S): at 19:03

## 2020-03-20 RX ADMIN — HEPARIN SODIUM 5000 UNIT(S): 5000 INJECTION INTRAVENOUS; SUBCUTANEOUS at 21:24

## 2020-03-20 RX ADMIN — FAMOTIDINE 20 MILLIGRAM(S): 10 INJECTION INTRAVENOUS at 05:25

## 2020-03-20 RX ADMIN — Medication 60 MILLIGRAM(S): at 05:25

## 2020-03-20 RX ADMIN — Medication 500 MICROGRAM(S): at 15:05

## 2020-03-20 RX ADMIN — Medication 600 MILLIGRAM(S): at 05:25

## 2020-03-20 RX ADMIN — HEPARIN SODIUM 5000 UNIT(S): 5000 INJECTION INTRAVENOUS; SUBCUTANEOUS at 15:32

## 2020-03-20 RX ADMIN — Medication 100 GRAM(S): at 05:28

## 2020-03-20 RX ADMIN — AMIODARONE HYDROCHLORIDE 400 MILLIGRAM(S): 400 TABLET ORAL at 12:16

## 2020-03-20 RX ADMIN — Medication 500 MICROGRAM(S): at 08:38

## 2020-03-20 NOTE — CONSULT NOTE ADULT - ASSESSMENT
Patient post STEMI and left main disease  Post CABG in Afib now with RVR  EF 30%  possible due to rapid afib  rate control and DOACs  Discussed with team in rounds

## 2020-03-20 NOTE — CONSULT NOTE ADULT - SUBJECTIVE AND OBJECTIVE BOX
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 1000 milliLiter(s) (50 mL/Hr) IV Continuous <Continuous>  dextrose 50% Injectable 50 milliLiter(s) IV Push every 15 minutes  dextrose 50% Injectable 25 milliLiter(s) IV Push every 15 minutes  famotidine    Tablet 20 milliGRAM(s) Oral two times a day  guaiFENesin  milliGRAM(s) Oral every 12 hours  heparin  Injectable 5000 Unit(s) SubCutaneous every 8 hours  insulin glargine Injectable (LANTUS) 10 Unit(s) SubCutaneous every morning  insulin lispro (HumaLOG) corrective regimen sliding scale   SubCutaneous three times a day before meals  insulin lispro Injectable (HumaLOG) 3 Unit(s) SubCutaneous three times a day before meals  ipratropium    for Nebulization 500 MICROGram(s) Nebulizer every 6 hours  magnesium sulfate  IVPB 1 Gram(s) IV Intermittent every 12 hours  metoprolol tartrate 12.5 milliGRAM(s) Oral every 8 hours  polyethylene glycol 3350 17 Gram(s) Oral daily  senna 2 Tablet(s) Oral at bedtime  sodium chloride 0.9%. 1000 milliLiter(s) (20 mL/Hr) IV Continuous <Continuous>  warfarin 1 milliGRAM(s) Oral once    MEDICATIONS  (PRN):  acetaminophen   Tablet .. 325 milliGRAM(s) Oral every 4 hours PRN Mild Pain (1 - 3)  dextrose 40% Gel 15 Gram(s) Oral once PRN Blood Glucose LESS THAN 70 milliGRAM(s)/deciliter  glucagon  Injectable 1 milliGRAM(s) IntraMuscular once PRN Glucose LESS THAN 70 milligrams/deciliter              SOCIAL HISTORY:    [x ] Non-smoker  [ ] Smoker  [ ] Alcohol      REVIEW OF SYSTEMS:  CONSTITUTIONAL: No fever, weight loss, or fatigue  CARDIOLOGY: PAtient denies chest pain, shortness of breath or syncopal episodes.   RESPIRATORY: denies shortness of breath, wheezeing.   NEUROLOGICAL: NO weakness, no focal deficits to report.  ENDOCRINOLOGICAL: no recent change in diabetic medications.   GI: no BRBPR, no N,V,diarrhea.    PSYCHIATRY: normal mood and affect  HEENT: no nasal discharge, no ecchymosis  SKIN: no ecchymosis, no breakdown  MUSCULOSKELETAL: Full range of motion x4.      PHYSICAL EXAM:  T(C): 36.6 (20 @ 08:00), Max: 36.8 (20 @ 20:00)  HR: 103 (20 @ 10:00) (91 - 117)  BP: 108/62 (20 @ 07:53) (108/62 - 108/62)  RR: 18 (20 @ 07:00) (16 - 20)  SpO2: 97% (20 @ 10:00) (82% - 98%)  Wt(kg): --  I&O's Summary    19 Mar 2020 07:01  -  20 Mar 2020 07:00  --------------------------------------------------------  IN: 1448.2 mL / OUT: 2348 mL / NET: -899.8 mL    20 Mar 2020 07:01  -  20 Mar 2020 12:42  --------------------------------------------------------  IN: 33.4 mL / OUT: 0 mL / NET: 33.4 mL      Daily     Daily Weight in k.6 (20 Mar 2020 05:00)    General Appearance: Normal	  Cardiovascular: Normal S1 S2, No JVD, No murmurs, No edema  Respiratory: Lungs clear to auscultation	  Psychiatry: A & O x 3, Mood & affect appropriate  Gastrointestinal:  Soft, Non-tender  Skin: No rashes, No ecchymoses, No cyanosis	  Neurologic: Non-focal  Extremities: Normal range of motion, No clubbing, cyanosis or edema  Vascular: Peripheral pulses palpable 2+ bilaterally        LABS:	 	                          8.6    9.52  )-----------( 99       ( 20 Mar 2020 02:00 )             26.3     03-20    137  |  101  |  21<H>  ----------------------------<  136<H>  4.2   |  24  |  0.9    Ca    8.4<L>      20 Mar 2020 02:00  Mg     2.7     03-20    TPro  5.3<L>  /  Alb  3.6  /  TBili  2.1<H>  /  DBili  0.6<H>  /  AST  54<H>  /  ALT  36  /  AlkPhos  78  03-20        PT/INR - ( 20 Mar 2020 02:00 )   PT: 16.00 sec;   INR: 1.39 ratio             proBNP:   Lipid Profile:   HgA1c:   TSH:       CARDIAC MARKERS:            TELEMETRY EVENTS: 	    ECG:  	  RADIOLOGY:  OTHER: 	    PREVIOUS DIAGNOSTIC TESTING:    [ ] Echocardiogram:  [ ]  Catheterization:  [ ] Stress Test:  	  	  ASSESSMENT/PLAN:

## 2020-03-20 NOTE — DISCHARGE NOTE NURSING/CASE MANAGEMENT/SOCIAL WORK - PATIENT PORTAL LINK FT
You can access the FollowMyHealth Patient Portal offered by [facility] by registering at the following website: [email or web address]. By joining [employer]’s FollowMyHealth portal, you will also be able to view your health information using other applications (apps) compatible with our system.

## 2020-03-20 NOTE — PROGRESS NOTE ADULT - ASSESSMENT
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%  AF RVR  s/p CABG x3 POD 5    con't tele  con't Amio, switch to PO  con't Metoprolol and uptitrate as BP tolerates  Can add Digoxin  repeat echo done, prelim read EF 30%  will require wearable defibrillator  optimize GDMT  con't AC when cleared by the surgeon (Coumadin started 3/18)    D/w Dr Templeton 85y Male with h/o pancreatic and liver cancer in remission since 40 years ago, recent paroxysmal AF  STEMI, s/p RCA stent <30days  triple vessel CAD  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

## 2020-03-20 NOTE — PROGRESS NOTE ADULT - SUBJECTIVE AND OBJECTIVE BOX
INTERVAL HPI/OVERNIGHT EVENTS:  still in AF 90s at rest 120s when ambulating  denies palpitations or other symptoms when tachycardic    MEDICATIONS  (STANDING):  aMIOdarone    Tablet 400 milliGRAM(s) Oral every 12 hours  aspirin enteric coated 81 milliGRAM(s) Oral daily  atorvastatin 80 milliGRAM(s) Oral at bedtime  chlorhexidine 4% Liquid 1 Application(s) Topical daily  clopidogrel Tablet 75 milliGRAM(s) Oral daily  dextrose 5%. 1000 milliLiter(s) (50 mL/Hr) IV Continuous <Continuous>  dextrose 50% Injectable 50 milliLiter(s) IV Push every 15 minutes  dextrose 50% Injectable 25 milliLiter(s) IV Push every 15 minutes  famotidine    Tablet 20 milliGRAM(s) Oral two times a day  guaiFENesin  milliGRAM(s) Oral every 12 hours  heparin  Injectable 5000 Unit(s) SubCutaneous every 8 hours  insulin glargine Injectable (LANTUS) 10 Unit(s) SubCutaneous every morning  insulin lispro (HumaLOG) corrective regimen sliding scale   SubCutaneous three times a day before meals  insulin lispro Injectable (HumaLOG) 3 Unit(s) SubCutaneous three times a day before meals  ipratropium    for Nebulization 500 MICROGram(s) Nebulizer every 6 hours  magnesium sulfate  IVPB 1 Gram(s) IV Intermittent every 12 hours  metoprolol tartrate 12.5 milliGRAM(s) Oral every 8 hours  polyethylene glycol 3350 17 Gram(s) Oral daily  senna 2 Tablet(s) Oral at bedtime  sodium chloride 0.9%. 1000 milliLiter(s) (20 mL/Hr) IV Continuous <Continuous>  warfarin 1 milliGRAM(s) Oral once    MEDICATIONS  (PRN):  acetaminophen   Tablet .. 325 milliGRAM(s) Oral every 4 hours PRN Mild Pain (1 - 3)  dextrose 40% Gel 15 Gram(s) Oral once PRN Blood Glucose LESS THAN 70 milliGRAM(s)/deciliter  glucagon  Injectable 1 milliGRAM(s) IntraMuscular once PRN Glucose LESS THAN 70 milligrams/deciliter      Allergies    No Known Allergies    Intolerances        REVIEW OF SYSTEMS    [x ] A ten-point review of systems was otherwise negative except as noted.  [ ] Due to altered mental status/intubation, subjective information were not able to be obtained from the patient. History was obtained, to the extent possible, from review of the chart and collateral sources of information.      Vital Signs Last 24 Hrs  T(C): 36.6 (20 Mar 2020 08:00), Max: 36.8 (19 Mar 2020 20:00)  T(F): 97.9 (20 Mar 2020 08:00), Max: 98.2 (19 Mar 2020 20:00)  HR: 103 (20 Mar 2020 10:00) (91 - 117)  BP: 108/62 (20 Mar 2020 07:53) (108/62 - 108/62)  BP(mean): 78 (20 Mar 2020 07:53) (78 - 78)  RR: 18 (20 Mar 2020 07:00) (16 - 20)  SpO2: 97% (20 Mar 2020 10:00) (82% - 98%)    03-19-20 @ 07:01  -  03-20-20 @ 07:00  --------------------------------------------------------  IN: 1448.2 mL / OUT: 2348 mL / NET: -899.8 mL    03-20-20 @ 07:01  -  03-20-20 @ 12:18  --------------------------------------------------------  IN: 33.4 mL / OUT: 0 mL / NET: 33.4 mL        PHYSICAL EXAM:    GENERAL: In no apparent distress, well nourished, and hydrated.  HEART: IRRegular rate and rhythm; No murmurs, rubs, or gallops.  midline incision site c/d/i  PULMONARY: Clear to auscultation and percussion.  Normal expansion/effort. No rales, wheezing, or rhonchi bilaterally.  ABDOMEN: Soft, Nontender, Nondistended; Bowel sounds present  EXTREMITIES:  Extremities warm, pink, well-perfused, 2+ Peripheral Pulses, No clubbing, cyanosis, or edema  NEUROLOGICAL: alert & oriented x 3, no focal deficits, PERRLA, EOMI    LABS:                        8.6    9.52  )-----------( 99       ( 20 Mar 2020 02:00 )             26.3     03-20    137  |  101  |  21<H>  ----------------------------<  136<H>  4.2   |  24  |  0.9    Ca    8.4<L>      20 Mar 2020 02:00  Mg     2.7     03-20    TPro  5.3<L>  /  Alb  3.6  /  TBili  2.1<H>  /  DBili  0.6<H>  /  AST  54<H>  /  ALT  36  /  AlkPhos  78  03-20    PT/INR - ( 20 Mar 2020 02:00 )   PT: 16.00 sec;   INR: 1.39 ratio               12 Lead ECG:   Ventricular Rate 106 BPM    Atrial Rate 133 BPM    QRS Duration 88 ms    Q-T Interval 298 ms    QTC Calculation(Bezet) 395 ms    R Axis -5 degrees    T Axis 171 degrees    Diagnosis Line Atrial fibrillation with rapid ventricular response  Inferiorinfarct , age undetermined  Possible Anterior infarct , age undetermined  Abnormal ECG    Confirmed by JULIO C SALMERON MD ([id number]) on 3/20/2020 11:03:56 AM (03-20-20 @ 08:05)      RADIOLOGY & ADDITIONAL TESTS: INTERVAL HPI/OVERNIGHT EVENTS:  still in AF 90s at rest 120s when ambulating  denies palpitations or other symptoms when tachycardic    MEDICATIONS  (STANDING):  aMIOdarone    Tablet 400 milliGRAM(s) Oral every 12 hours  aspirin enteric coated 81 milliGRAM(s) Oral daily  atorvastatin 80 milliGRAM(s) Oral at bedtime  chlorhexidine 4% Liquid 1 Application(s) Topical daily  clopidogrel Tablet 75 milliGRAM(s) Oral daily  dextrose 5%. 1000 milliLiter(s) (50 mL/Hr) IV Continuous <Continuous>  dextrose 50% Injectable 50 milliLiter(s) IV Push every 15 minutes  dextrose 50% Injectable 25 milliLiter(s) IV Push every 15 minutes  famotidine    Tablet 20 milliGRAM(s) Oral two times a day  guaiFENesin  milliGRAM(s) Oral every 12 hours  heparin  Injectable 5000 Unit(s) SubCutaneous every 8 hours  insulin glargine Injectable (LANTUS) 10 Unit(s) SubCutaneous every morning  insulin lispro (HumaLOG) corrective regimen sliding scale   SubCutaneous three times a day before meals  insulin lispro Injectable (HumaLOG) 3 Unit(s) SubCutaneous three times a day before meals  ipratropium    for Nebulization 500 MICROGram(s) Nebulizer every 6 hours  magnesium sulfate  IVPB 1 Gram(s) IV Intermittent every 12 hours  metoprolol tartrate 12.5 milliGRAM(s) Oral every 8 hours  polyethylene glycol 3350 17 Gram(s) Oral daily  senna 2 Tablet(s) Oral at bedtime  sodium chloride 0.9%. 1000 milliLiter(s) (20 mL/Hr) IV Continuous <Continuous>  warfarin 1 milliGRAM(s) Oral once    MEDICATIONS  (PRN):  acetaminophen   Tablet .. 325 milliGRAM(s) Oral every 4 hours PRN Mild Pain (1 - 3)  dextrose 40% Gel 15 Gram(s) Oral once PRN Blood Glucose LESS THAN 70 milliGRAM(s)/deciliter  glucagon  Injectable 1 milliGRAM(s) IntraMuscular once PRN Glucose LESS THAN 70 milligrams/deciliter      Allergies    No Known Allergies    Intolerances        REVIEW OF SYSTEMS    [x ] A ten-point review of systems was otherwise negative except as noted.  [ ] Due to altered mental status/intubation, subjective information were not able to be obtained from the patient. History was obtained, to the extent possible, from review of the chart and collateral sources of information.      Vital Signs Last 24 Hrs  T(C): 36.6 (20 Mar 2020 08:00), Max: 36.8 (19 Mar 2020 20:00)  T(F): 97.9 (20 Mar 2020 08:00), Max: 98.2 (19 Mar 2020 20:00)  HR: 103 (20 Mar 2020 10:00) (91 - 117)  BP: 108/62 (20 Mar 2020 07:53) (108/62 - 108/62)  BP(mean): 78 (20 Mar 2020 07:53) (78 - 78)  RR: 18 (20 Mar 2020 07:00) (16 - 20)  SpO2: 97% (20 Mar 2020 10:00) (82% - 98%)    03-19-20 @ 07:01  -  03-20-20 @ 07:00  --------------------------------------------------------  IN: 1448.2 mL / OUT: 2348 mL / NET: -899.8 mL    03-20-20 @ 07:01  -  03-20-20 @ 12:18  --------------------------------------------------------  IN: 33.4 mL / OUT: 0 mL / NET: 33.4 mL        PHYSICAL EXAM:    GENERAL: In no apparent distress, well nourished, and hydrated.  HEART: IRRegular rate and rhythm; No murmurs, rubs, or gallops.  midline incision site c/d/i  PULMONARY: Clear to auscultation and percussion.  Normal expansion/effort. No rales, wheezing, or rhonchi bilaterally.  ABDOMEN: Soft, Nontender, Nondistended; Bowel sounds present  EXTREMITIES:  Extremities warm, pink, well-perfused, 2+ Peripheral Pulses, No clubbing, cyanosis, or edema  NEUROLOGICAL: alert & oriented x 3, no focal deficits, PERRLA, EOMI    LABS:                        8.6    9.52  )-----------( 99       ( 20 Mar 2020 02:00 )             26.3     03-20    137  |  101  |  21<H>  ----------------------------<  136<H>  4.2   |  24  |  0.9    Ca    8.4<L>      20 Mar 2020 02:00  Mg     2.7     03-20    TPro  5.3<L>  /  Alb  3.6  /  TBili  2.1<H>  /  DBili  0.6<H>  /  AST  54<H>  /  ALT  36  /  AlkPhos  78  03-20    PT/INR - ( 20 Mar 2020 02:00 )   PT: 16.00 sec;   INR: 1.39 ratio               12 Lead ECG:   Ventricular Rate 106 BPM    Atrial Rate 133 BPM    QRS Duration 88 ms    Q-T Interval 298 ms    QTC Calculation(Bezet) 395 ms    R Axis -5 degrees    T Axis 171 degrees    Diagnosis Line Atrial fibrillation with rapid ventricular response  Inferiorinfarct , age undetermined  Possible Anterior infarct , age undetermined  Abnormal ECG    Confirmed by JULIO C SALMERON MD ([id number]) on 3/20/2020 11:03:56 AM (03-20-20 @ 08:05)      RADIOLOGY & ADDITIONAL TESTS:    < from: Transthoracic Echocardiogram (03.20.20 @ 10:28) >  Summary:   1. Severely decreased segmental left ventricular systolic function.   2. Multiple left ventricular regional wall motion abnormalities exist. See wall motion findings.   3. LV Ejection Fraction by House's Method with a biplane EF of 26 %.   4. Normal left ventricular internal cavity size.   5. The left ventricular diastolic function could not be assessedin this study.   6. Mild aortic regurgitation.   7. Sclerotic aortic valve with normal opening.   8. LA volume Index is 34.0 ml/m² ml/m2.   9. There is mild aortic root calcification.    PHYSICIAN INTERPRETATION:  Left Ventricle: The left ventricularinternal cavity size is normal. Left ventricular wall thickness is normal. Severely decreased segmental left ventricular systolic function. The left ventricular diastolic function could not be assessed in this study.    < end of copied text >

## 2020-03-20 NOTE — DISCHARGE NOTE NURSING/CASE MANAGEMENT/SOCIAL WORK - NSDCPEWEB_GEN_ALL_CORE
[facility] for Tobacco Control website --- [email or web address] --- [email or web address]

## 2020-03-20 NOTE — PROGRESS NOTE ADULT - SUBJECTIVE AND OBJECTIVE BOX
OPERATIVE PROCEDURE(s):                POD # 4 s/p CABG                    85yMale  SURGEON(s): IFEANYI Garcia  SUBJECTIVE ASSESSMENT:   Vital Signs Last 24 Hrs  T(F): 97.9 (20 Mar 2020 08:00), Max: 98.2 (19 Mar 2020 20:00)  HR: 117 (20 Mar 2020 08:00) (73 - 125)  BP: 108/62 (20 Mar 2020 07:53) (108/62 - 108/62)  BP(mean): 78 (20 Mar 2020 07:53) (78 - 78)  ABP: 104/59 (20 Mar 2020 08:00) (88/50 - 130/62)  ABP(mean): 75 (20 Mar 2020 08:00)  RR: 18 (20 Mar 2020 07:00) (16 - 20)  SpO2: 97% (20 Mar 2020 08:00) (82% - 98%)  CVP(mm Hg): --  CVP(cm H2O): --  CO: --  CI: --  PA: --  SVR: --    I&O's Detail    19 Mar 2020 07:01  -  20 Mar 2020 07:00  --------------------------------------------------------  IN:    amiodarone Infusion: 66.6 mL    amiodarone Infusion: 221.6 mL    IV PiggyBack: 200 mL    Oral Fluid: 960 mL  Total IN: 1448.2 mL    OUT:    Indwelling Catheter - Urethral: 2198 mL    Voided: 150 mL  Total OUT: 2348 mL        Net:   I&O's Detail    18 Mar 2020 07:01  -  19 Mar 2020 07:00  --------------------------------------------------------  Total NET: -588.2 mL      19 Mar 2020 07:01  -  20 Mar 2020 07:00  --------------------------------------------------------  Total NET: -899.8 mL        CAPILLARY BLOOD GLUCOSE      POCT Blood Glucose.: 176 mg/dL (20 Mar 2020 06:31)  POCT Blood Glucose.: 168 mg/dL (19 Mar 2020 21:23)  POCT Blood Glucose.: 111 mg/dL (19 Mar 2020 15:41)  POCT Blood Glucose.: 181 mg/dL (19 Mar 2020 11:28)    Physical Exam:  General: NAD; A&Ox3  Cardiac: S1/S2, RRR, no murmur, no rubs  Lungs: unlabored respirations, CTA b/l, no wheeze, no rales, no crackles  Abdomen: Soft/NT/ND; positive bowel sounds x 4  Sternum: Intact, no click, incision healing well with no drainage  Incisions: Incisions clean/dry/intact  Extremities: No edema b/l lower extremities; good capillary refill; no cyanosis; palpable 1+ pedal pulses b/l    Central Venous Catheter: Yes[]  No[] , If Yes indication:           Day #  EPICARDIAL WIRES:  [] YES [] NO                                              Day #  BOWEL MOVEMENT:  [] YES 3/20        LABS:                        8.6<L>  9.52  )-----------( 99<L>    ( 20 Mar 2020 02:00 )             26.3<L>                        8.7<L>  7.95  )-----------( 71<L>    ( 19 Mar 2020 02:24 )             25.0<L>    03-20    137  |  101  |  21<H>  ----------------------------<  136<H>  4.2   |  24  |  0.9  03-19    139  |  99  |  17  ----------------------------<  100<H>  3.9   |  29  |  1.0    Ca    8.4<L>      20 Mar 2020 02:00  Mg     2.7     03-20    TPro  5.3<L> [6.0 - 8.0]  /  Alb  3.6 [3.5 - 5.2]  /  TBili  2.1<H> [0.2 - 1.2]  /  DBili  0.6<H> [0.0 - 0.2]  /  AST  54<H> [0 - 41]  /  ALT  36 [0 - 41]  /  AlkPhos  78 [30 - 115]  03-20    PT/INR - ( 20 Mar 2020 02:00 )   PT: ;   INR: 1.39 ratio         PT/INR - ( 19 Mar 2020 02:24 )   PT: ;   INR: 1.24 ratio         ABG - ( 20 Mar 2020 04:56 )  pH: 7.52  /  pCO2: 32    /  pO2: 62    / HCO3: 26    / Base Excess: 3.5   /  SaO2: 95    /  LA: 1.4              RADIOLOGY & ADDITIONAL TESTS:  CXR:  EKG:  MEDICATIONS  (STANDING):  aMIOdarone Infusion 0.5 mG/Min (16.7 mL/Hr) IV Continuous <Continuous>  aspirin enteric coated 81 milliGRAM(s) Oral daily  atorvastatin 80 milliGRAM(s) Oral at bedtime  chlorhexidine 4% Liquid 1 Application(s) Topical daily  dextrose 5%. 1000 milliLiter(s) (50 mL/Hr) IV Continuous <Continuous>  dextrose 50% Injectable 50 milliLiter(s) IV Push every 15 minutes  dextrose 50% Injectable 25 milliLiter(s) IV Push every 15 minutes  famotidine    Tablet 20 milliGRAM(s) Oral two times a day  guaiFENesin  milliGRAM(s) Oral every 12 hours  insulin glargine Injectable (LANTUS) 10 Unit(s) SubCutaneous every morning  insulin lispro (HumaLOG) corrective regimen sliding scale   SubCutaneous three times a day before meals  insulin lispro Injectable (HumaLOG) 3 Unit(s) SubCutaneous three times a day before meals  ipratropium    for Nebulization 500 MICROGram(s) Nebulizer every 6 hours  magnesium sulfate  IVPB 1 Gram(s) IV Intermittent every 12 hours  metoprolol tartrate 12.5 milliGRAM(s) Oral every 12 hours  polyethylene glycol 3350 17 Gram(s) Oral daily  senna 2 Tablet(s) Oral at bedtime  sodium chloride 0.9%. 1000 milliLiter(s) (20 mL/Hr) IV Continuous <Continuous>    MEDICATIONS  (PRN):  acetaminophen   Tablet .. 325 milliGRAM(s) Oral every 4 hours PRN Mild Pain (1 - 3)  dextrose 40% Gel 15 Gram(s) Oral once PRN Blood Glucose LESS THAN 70 milliGRAM(s)/deciliter  glucagon  Injectable 1 milliGRAM(s) IntraMuscular once PRN Glucose LESS THAN 70 milligrams/deciliter    HEPARIN:  [] YES [] NO  Dose: XX UNITS/HR UNITS Q8H  LOVENOX:[] YES [] NO  Dose: XX mg Q24H  COUMADIN: []  YES [] NO  Dose: XX mg  Q24H  SCD's: YES b/l  GI Prophylaxis: Protonix [], Pepcid [], None [], (Contra-indication:.....)    Post-Op Beta-Blockers: Yes [], No[], If No, then contraindication:  Post-Op Aspirin: Yes [],  No [], If No, then contraindication:  Post-Op Statin: Yes [], No[], If No, then contraindication:  Allergies    No Known Allergies    Intolerances      Ambulation/Activity Status:    Assessment/Plan:  85y Male status-post .....  - Case and plan discussed with CTU Intensivist and CT Surgeon - Dr. Powell/Radha/Anuradha   - Continue CTU supportive care    - Continue DVT/GI prophylaxis  - Incentive Spirometry 10 times an hour  - Continue to advance physical activity as tolerated and continue PT/OT as directed  1. CAD: Continue ASA, statin, BB  2. HTN:   3. A. Fib:   4. COPD/Hypoxia:   5. DM/Glucose Control:     Social Service Disposition: OPERATIVE PROCEDURE(s):                POD # 4 s/p CABG                    85yMale  SURGEON(s): IFEANYI Garcia  SUBJECTIVE ASSESSMENT:   Vital Signs Last 24 Hrs  T(F): 97.9 (20 Mar 2020 08:00), Max: 98.2 (19 Mar 2020 20:00)  HR: 117 (20 Mar 2020 08:00) (73 - 125)  BP: 108/62 (20 Mar 2020 07:53) (108/62 - 108/62)  BP(mean): 78 (20 Mar 2020 07:53) (78 - 78)  ABP: 104/59 (20 Mar 2020 08:00) (88/50 - 130/62)  ABP(mean): 75 (20 Mar 2020 08:00)  RR: 18 (20 Mar 2020 07:00) (16 - 20)  SpO2: 97% (20 Mar 2020 08:00) (82% - 98%)    I&O's Detail    19 Mar 2020 07:01  -  20 Mar 2020 07:00  --------------------------------------------------------  IN:    amiodarone Infusion: 66.6 mL    amiodarone Infusion: 221.6 mL    IV PiggyBack: 200 mL    Oral Fluid: 960 mL  Total IN: 1448.2 mL    OUT:    Indwelling Catheter - Urethral: 2198 mL    Voided: 150 mL  Total OUT: 2348 mL    Net:   I&O's Detail    18 Mar 2020 07:01  -  19 Mar 2020 07:00  --------------------------------------------------------  Total NET: -588.2 mL      19 Mar 2020 07:01  -  20 Mar 2020 07:00  --------------------------------------------------------  Total NET: -899.8 mL    CAPILLARY BLOOD GLUCOSE    POCT Blood Glucose.: 176 mg/dL (20 Mar 2020 06:31)  POCT Blood Glucose.: 168 mg/dL (19 Mar 2020 21:23)  POCT Blood Glucose.: 111 mg/dL (19 Mar 2020 15:41)  POCT Blood Glucose.: 181 mg/dL (19 Mar 2020 11:28)    Physical Exam:  General: NAD; A&Ox3  Cardiac: S1/S2, RRR, no murmur, no rubs  Lungs: unlabored shallow respirations, decreased at bases  Abdomen: Soft/NT/ND  Sternum: Intact, no click, incision healing well with no drainage  Incisions: Incisions clean/dry/intact  Extremities: No edema b/l lower extremities    Central Venous Catheter: Yes[]   Yes indication:     unstable afib with hypotension      Day #  EPICARDIAL WIRES:  [] YES [] NO                                              Day #  BOWEL MOVEMENT:  [] YES 3/20        LABS:                        8.6<L>  9.52  )-----------( 99<L>    ( 20 Mar 2020 02:00 )             26.3<L>                        8.7<L>  7.95  )-----------( 71<L>    ( 19 Mar 2020 02:24 )             25.0<L>    03-20    137  |  101  |  21<H>  ----------------------------<  136<H>  4.2   |  24  |  0.9  03-19    139  |  99  |  17  ----------------------------<  100<H>  3.9   |  29  |  1.0    Ca    8.4<L>      20 Mar 2020 02:00  Mg     2.7     03-20    TPro  5.3<L> [6.0 - 8.0]  /  Alb  3.6 [3.5 - 5.2]  /  TBili  2.1<H> [0.2 - 1.2]  /  DBili  0.6<H> [0.0 - 0.2]  /  AST  54<H> [0 - 41]  /  ALT  36 [0 - 41]  /  AlkPhos  78 [30 - 115]  03-20    PT/INR - ( 20 Mar 2020 02:00 )   PT: ;   INR: 1.39 ratio         PT/INR - ( 19 Mar 2020 02:24 )   PT: ;   INR: 1.24 ratio       ABG - ( 20 Mar 2020 04:56 )  pH: 7.52  /  pCO2: 32    /  pO2: 62    / HCO3: 26    / Base Excess: 3.5   /  SaO2: 95    /  LA: 1.4       Xray Chest 1 View- PORTABLE-Routine (03.20.20 @ 04:56) >  Interval improvement in right lower lobe opacities/effusion.     Stable left lower lobe opacity/effusion.     Transthoracic Echocardiogram (03.20.20 @ 10:28) >  1. Severely decreased segmental left ventricular systolic function.   2. Multiple left ventricular regional wall motion abnormalities exist. See wall motion findings.   3. LV Ejection Fraction by House's Method with a biplane EF of 26 %.   4. Normal left ventricular internal cavity size.   5. The left ventricular diastolic function could not be assessedin this study.   6. Mild aortic regurgitation.   7. Sclerotic aortic valve with normal opening.   8. LA volume Index is 34.0 ml/m² ml/m2.   9. There is mild aortic root calcification.    MEDICATIONS  (STANDING):  aMIOdarone Infusion 0.5 mG/Min (16.7 mL/Hr) IV Continuous <Continuous>  aspirin enteric coated 81 milliGRAM(s) Oral daily  atorvastatin 80 milliGRAM(s) Oral at bedtime  chlorhexidine 4% Liquid 1 Application(s) Topical daily  dextrose 5%. 1000 milliLiter(s) (50 mL/Hr) IV Continuous <Continuous>  dextrose 50% Injectable 50 milliLiter(s) IV Push every 15 minutes  dextrose 50% Injectable 25 milliLiter(s) IV Push every 15 minutes  famotidine    Tablet 20 milliGRAM(s) Oral two times a day  guaiFENesin  milliGRAM(s) Oral every 12 hours  insulin glargine Injectable (LANTUS) 10 Unit(s) SubCutaneous every morning  insulin lispro (HumaLOG) corrective regimen sliding scale   SubCutaneous three times a day before meals  insulin lispro Injectable (HumaLOG) 3 Unit(s) SubCutaneous three times a day before meals  ipratropium    for Nebulization 500 MICROGram(s) Nebulizer every 6 hours  magnesium sulfate  IVPB 1 Gram(s) IV Intermittent every 12 hours  metoprolol tartrate 12.5 milliGRAM(s) Oral every 12 hours  polyethylene glycol 3350 17 Gram(s) Oral daily  senna 2 Tablet(s) Oral at bedtime  sodium chloride 0.9%. 1000 milliLiter(s) (20 mL/Hr) IV Continuous <Continuous>    MEDICATIONS  (PRN):  acetaminophen   Tablet .. 325 milliGRAM(s) Oral every 4 hours PRN Mild Pain (1 - 3)  dextrose 40% Gel 15 Gram(s) Oral once PRN Blood Glucose LESS THAN 70 milliGRAM(s)/deciliter  glucagon  Injectable 1 milliGRAM(s) IntraMuscular once PRN Glucose LESS THAN 70 milligrams/deciliter    Allergies    No Known Allergies    Intolerances    Ambulation/Activity Status:    Assessment/Plan:  85y Male status-post CABG4              POD # 4  - Case and plan discussed with CTU Intensivist and CT Surgeon - Dr. Garcia  - Continue CTU supportive care and ongoing plan of care as per continuing CTU rounds.    - Continue GI prophylaxis  - start DVT prophylaxis with SC heparin  - Incentive Spirometry 10 times an hour  - Continue to advance physical activity as tolerated and continue PT/OT as directed  1. CAD: Continue ASA 81, statin and increase BB to Q 8.  2. A. Fib: stop amiodarone gtt started PO Amiodarone, coumadin 1 mg tonight  - repeat EF 26% on TTE, will need a life vest on discharge ([facility] notified)  - if A-fib persists may consider adding Digoxin  - RX for rolling walker written out for anticipated discharge this weekend    Social Service Disposition:   Home

## 2020-03-20 NOTE — PROGRESS NOTE ADULT - SUBJECTIVE AND OBJECTIVE BOX
CTU Attending Progress Daily Note     20 Mar 2020 09:22    Procedure:         CABG                                         POD#           4        Patient seen as post-op critical care follow-up    HPI:    See preop testing chart H&P    Interval event for past 24 hr:  MARILEELOKESH  85y had Afib    Current Complains:  LOKESH HUNT has no new complaints    REVIEW OF SYSTEMS:  CONSTITUTIONAL:  [-] weakness, [-] fevers, [-] chills  EYES/ENT: [-] visual changes, [-] vertigo, [-] throat pain   NECK: [-] pain, [-] stiffness  RESPIRATORY: [-] cough, [-] wheezing, [-] hemoptysis, [-] shortness of breath  CARDIOVASCULAR: [-] chest pain, [-] palpitations, [-] orthopnea  GASTROINTESTINAL:    [-]abdominal pain, [-] nausea, [-] vomiting, [-] hematemesis, [-] diarrhea, [-] constipation, [-] melena, [-] hematochezia.  GENITOURINARY: [-] dysuria, [-] frequency, [-] hematuria  NEUROLOGICAL: [-] numbness, [-] weakness  SKIN: [-] itching, [-] burning, [-] rashes, [-] lesions   All other review of systems is negative unless indicated above.    [  ] Unable to assess ROS because :    OBJECTIVE:  ICU Vital Signs Last 24 Hrs  T(C): 36.6 (20 Mar 2020 08:00), Max: 36.8 (19 Mar 2020 20:00)  T(F): 97.9 (20 Mar 2020 08:00), Max: 98.2 (19 Mar 2020 20:00)  HR: 117 (20 Mar 2020 08:00) (73 - 125)  BP: 108/62 (20 Mar 2020 07:53) (108/62 - 108/62)  BP(mean): 78 (20 Mar 2020 07:53) (78 - 78)  ABP: 104/59 (20 Mar 2020 08:00) (88/50 - 130/62)  ABP(mean): 75 (20 Mar 2020 08:00) (60 - 104)  RR: 18 (20 Mar 2020 07:00) (16 - 20)  SpO2: 97% (20 Mar 2020 08:00) (82% - 98%)      I&O's Summary    19 Mar 2020 07:01  -  20 Mar 2020 07:00  --------------------------------------------------------  IN: 1448.2 mL / OUT: 2348 mL / NET: -899.8 mL    20 Mar 2020 07:01  -  20 Mar 2020 09:22  --------------------------------------------------------  IN: 33.4 mL / OUT: 0 mL / NET: 33.4 mL      PHYSICAL EXAM:  General: WN/WD NAD    HEENT:     [+] NCAT  [+] EOMI  [-] Conjuctival edema   [-] Icterus   [-] Thrush   [-] ETT  [-] NGT/OGT    Neck:         [+] FROM   [-] JVD     [-] Nodes     [-] Masses    [+] Mid-line trachea    [-] Tracheostomy    Chest:         [-] Sternal click   [-] Sternal drainage   [+] Pacing wires   [-] Chest tubes   [-] SubQ emphysema    Lungs:          [+] CTA   [-] Rhonchi   [-] Rales    [-] Wheezing    [-] Decreased BS    [-] Dullness R L    Cardiac:       [+] S1 [+] S2    [+] RRR   [-] Irregular   [-] S3   [-] S4    [-] Murmurs    [-] Rub    Abdomen:    [+] BS    [+] Soft    [+] Non-tender     [-] Distended    [-] Organomegaly  [-] PEG    Extremities:   [-] Cyanosis U/L   [-] Clubbing  U/L  [-] LE/UE Edema   [+] Capillary refill    [+] Pulses     Neuro:        [+] Awake   [+]  Alert   [-] Confused   [-] Lethargic   [-] Sedated   [-] Generalized Weakness    Skin:        [-] Rashes    [-] Erythema   [+] Normal incisions   [+] IV sites intact   [-] Sacral decubitus    Tubes:  LINES:    CAPILLARY BLOOD GLUCOSE      POCT Blood Glucose.: 176 mg/dL (20 Mar 2020 06:31)    CAPILLARY BLOOD GLUCOSE      POCT Blood Glucose.: 176 mg/dL (20 Mar 2020 06:31)  POCT Blood Glucose.: 168 mg/dL (19 Mar 2020 21:23)  POCT Blood Glucose.: 111 mg/dL (19 Mar 2020 15:41)  POCT Blood Glucose.: 181 mg/dL (19 Mar 2020 11:28)      HOSPITAL MEDICATIONS:  MEDICATIONS  (STANDING):  aMIOdarone Infusion 0.5 mG/Min (16.7 mL/Hr) IV Continuous <Continuous>  aspirin enteric coated 81 milliGRAM(s) Oral daily  atorvastatin 80 milliGRAM(s) Oral at bedtime  chlorhexidine 4% Liquid 1 Application(s) Topical daily  dextrose 5%. 1000 milliLiter(s) (50 mL/Hr) IV Continuous <Continuous>  dextrose 50% Injectable 50 milliLiter(s) IV Push every 15 minutes  dextrose 50% Injectable 25 milliLiter(s) IV Push every 15 minutes  famotidine    Tablet 20 milliGRAM(s) Oral two times a day  guaiFENesin  milliGRAM(s) Oral every 12 hours  insulin glargine Injectable (LANTUS) 10 Unit(s) SubCutaneous every morning  insulin lispro (HumaLOG) corrective regimen sliding scale   SubCutaneous three times a day before meals  insulin lispro Injectable (HumaLOG) 3 Unit(s) SubCutaneous three times a day before meals  ipratropium    for Nebulization 500 MICROGram(s) Nebulizer every 6 hours  magnesium sulfate  IVPB 1 Gram(s) IV Intermittent every 12 hours  metoprolol tartrate 12.5 milliGRAM(s) Oral every 12 hours  polyethylene glycol 3350 17 Gram(s) Oral daily  senna 2 Tablet(s) Oral at bedtime  sodium chloride 0.9%. 1000 milliLiter(s) (20 mL/Hr) IV Continuous <Continuous>    MEDICATIONS  (PRN):  acetaminophen   Tablet .. 325 milliGRAM(s) Oral every 4 hours PRN Mild Pain (1 - 3)  dextrose 40% Gel 15 Gram(s) Oral once PRN Blood Glucose LESS THAN 70 milliGRAM(s)/deciliter  glucagon  Injectable 1 milliGRAM(s) IntraMuscular once PRN Glucose LESS THAN 70 milligrams/deciliter      LABS:  ABG - ( 20 Mar 2020 04:56 )  pH, Arterial: 7.52  pH, Blood: x     /  pCO2: 32    /  pO2: 62    / HCO3: 26    / Base Excess: 3.5   /  SaO2: 95                                      8.6    9.52  )-----------( 99       ( 20 Mar 2020 02:00 )             26.3     03-20    137  |  101  |  21<H>  ----------------------------<  136<H>  4.2   |  24  |  0.9    Ca    8.4<L>      20 Mar 2020 02:00  Mg     2.7     03-20    TPro  5.3<L>  /  Alb  3.6  /  TBili  2.1<H>  /  DBili  0.6<H>  /  AST  54<H>  /  ALT  36  /  AlkPhos  78  03-20    PT/INR - ( 20 Mar 2020 02:00 )   PT: 16.00 sec;   INR: 1.39 ratio                 RADIOLOGY:  Reviewed and interpreted by me  CXR from 03-20-20 shows [+] mild congestion, [-] pneumothorax, [-] R/L effusion, [-] cardiomegaly,       ECG:  Reviewed and interpreted by me:   QTC:    Assessment:  CAD SP CABG  Acute blood loss anemia  Afib on amio gtt    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 Wean off supplemental oxygen as able. Daily CXR.   Cardio: Monitor telemetry/alarms. Continue cardiac meds, change amio to PO  GI: Tolerating diet. Continue stool softeners. Continue GI prophylaxis  Renal: monitor urine output, supplement electrolytes as needed  Vasc: Heparin SC/SCDs for DVT prophylaxis  Heme: Monitor H/H.   ID: Off antibiotics. Stable.  Endocrine: Monitor finger stick blood sugar and control hyperglycemia with insulin  Physical Therapy: OOB/ambulate        Discussed with Cardiothoracic Team at AM rounds.

## 2020-03-20 NOTE — DISCHARGE NOTE NURSING/CASE MANAGEMENT/SOCIAL WORK - NSDCPEEMAIL_GEN_ALL_CORE
[facility] for Tobacco Control email [email or web address]

## 2020-03-21 LAB
ANION GAP SERPL CALC-SCNC: 11 MMOL/L — SIGNIFICANT CHANGE UP (ref 7–14)
BASOPHILS # BLD AUTO: 0.01 K/UL — SIGNIFICANT CHANGE UP (ref 0–0.2)
BASOPHILS NFR BLD AUTO: 0.1 % — SIGNIFICANT CHANGE UP (ref 0–1)
BUN SERPL-MCNC: 25 MG/DL — HIGH (ref 10–20)
CALCIUM SERPL-MCNC: 8.2 MG/DL — LOW (ref 8.5–10.1)
CHLORIDE SERPL-SCNC: 101 MMOL/L — SIGNIFICANT CHANGE UP (ref 98–110)
CO2 SERPL-SCNC: 27 MMOL/L — SIGNIFICANT CHANGE UP (ref 17–32)
CREAT SERPL-MCNC: 0.9 MG/DL — SIGNIFICANT CHANGE UP (ref 0.7–1.5)
EOSINOPHIL # BLD AUTO: 0 K/UL — SIGNIFICANT CHANGE UP (ref 0–0.7)
EOSINOPHIL NFR BLD AUTO: 0 % — SIGNIFICANT CHANGE UP (ref 0–8)
GLUCOSE BLDC GLUCOMTR-MCNC: 101 MG/DL — HIGH (ref 70–99)
GLUCOSE BLDC GLUCOMTR-MCNC: 103 MG/DL — HIGH (ref 70–99)
GLUCOSE BLDC GLUCOMTR-MCNC: 84 MG/DL — SIGNIFICANT CHANGE UP (ref 70–99)
GLUCOSE SERPL-MCNC: 84 MG/DL — SIGNIFICANT CHANGE UP (ref 70–99)
HCT VFR BLD CALC: 25.1 % — LOW (ref 42–52)
HGB BLD-MCNC: 8.5 G/DL — LOW (ref 14–18)
IMM GRANULOCYTES NFR BLD AUTO: 0.2 % — SIGNIFICANT CHANGE UP (ref 0.1–0.3)
INR BLD: 1.61 RATIO — HIGH (ref 0.65–1.3)
LYMPHOCYTES # BLD AUTO: 1.29 K/UL — SIGNIFICANT CHANGE UP (ref 1.2–3.4)
LYMPHOCYTES # BLD AUTO: 14.7 % — LOW (ref 20.5–51.1)
MAGNESIUM SERPL-MCNC: 2.9 MG/DL — HIGH (ref 1.8–2.4)
MCHC RBC-ENTMCNC: 32 PG — HIGH (ref 27–31)
MCHC RBC-ENTMCNC: 33.9 G/DL — SIGNIFICANT CHANGE UP (ref 32–37)
MCV RBC AUTO: 94.4 FL — HIGH (ref 80–94)
MONOCYTES # BLD AUTO: 0.71 K/UL — HIGH (ref 0.1–0.6)
MONOCYTES NFR BLD AUTO: 8.1 % — SIGNIFICANT CHANGE UP (ref 1.7–9.3)
NEUTROPHILS # BLD AUTO: 6.75 K/UL — HIGH (ref 1.4–6.5)
NEUTROPHILS NFR BLD AUTO: 76.9 % — HIGH (ref 42.2–75.2)
NRBC # BLD: 0 /100 WBCS — SIGNIFICANT CHANGE UP (ref 0–0)
PLATELET # BLD AUTO: 134 K/UL — SIGNIFICANT CHANGE UP (ref 130–400)
POTASSIUM SERPL-MCNC: 3.8 MMOL/L — SIGNIFICANT CHANGE UP (ref 3.5–5)
POTASSIUM SERPL-SCNC: 3.8 MMOL/L — SIGNIFICANT CHANGE UP (ref 3.5–5)
PROTHROM AB SERPL-ACNC: 18.5 SEC — HIGH (ref 9.95–12.87)
RBC # BLD: 2.66 M/UL — LOW (ref 4.7–6.1)
RBC # FLD: 16 % — HIGH (ref 11.5–14.5)
SODIUM SERPL-SCNC: 139 MMOL/L — SIGNIFICANT CHANGE UP (ref 135–146)
WBC # BLD: 8.78 K/UL — SIGNIFICANT CHANGE UP (ref 4.8–10.8)
WBC # FLD AUTO: 8.78 K/UL — SIGNIFICANT CHANGE UP (ref 4.8–10.8)

## 2020-03-21 PROCEDURE — 99232 SBSQ HOSP IP/OBS MODERATE 35: CPT

## 2020-03-21 PROCEDURE — 99233 SBSQ HOSP IP/OBS HIGH 50: CPT

## 2020-03-21 PROCEDURE — 71045 X-RAY EXAM CHEST 1 VIEW: CPT | Mod: 26

## 2020-03-21 PROCEDURE — 93745 SET-UP CARDIOVERT-DEFIBRILL: CPT | Mod: 26

## 2020-03-21 PROCEDURE — 93010 ELECTROCARDIOGRAM REPORT: CPT

## 2020-03-21 RX ORDER — FUROSEMIDE 40 MG
40 TABLET ORAL ONCE
Refills: 0 | Status: COMPLETED | OUTPATIENT
Start: 2020-03-21 | End: 2020-03-21

## 2020-03-21 RX ORDER — WARFARIN SODIUM 2.5 MG/1
2 TABLET ORAL ONCE
Refills: 0 | Status: COMPLETED | OUTPATIENT
Start: 2020-03-21 | End: 2020-03-21

## 2020-03-21 RX ORDER — POTASSIUM CHLORIDE 20 MEQ
20 PACKET (EA) ORAL ONCE
Refills: 0 | Status: COMPLETED | OUTPATIENT
Start: 2020-03-21 | End: 2020-03-21

## 2020-03-21 RX ORDER — ENOXAPARIN SODIUM 100 MG/ML
40 INJECTION SUBCUTANEOUS
Refills: 0 | Status: DISCONTINUED | OUTPATIENT
Start: 2020-03-21 | End: 2020-03-23

## 2020-03-21 RX ORDER — POTASSIUM CHLORIDE 20 MEQ
20 PACKET (EA) ORAL
Refills: 0 | Status: COMPLETED | OUTPATIENT
Start: 2020-03-21 | End: 2020-03-21

## 2020-03-21 RX ORDER — DIGOXIN 250 MCG
0.12 TABLET ORAL DAILY
Refills: 0 | Status: DISCONTINUED | OUTPATIENT
Start: 2020-03-21 | End: 2020-03-24

## 2020-03-21 RX ADMIN — Medication 500 MICROGRAM(S): at 09:03

## 2020-03-21 RX ADMIN — Medication 20 MILLIEQUIVALENT(S): at 15:36

## 2020-03-21 RX ADMIN — Medication 3 UNIT(S): at 07:30

## 2020-03-21 RX ADMIN — ATORVASTATIN CALCIUM 80 MILLIGRAM(S): 80 TABLET, FILM COATED ORAL at 21:08

## 2020-03-21 RX ADMIN — Medication 12.5 MILLIGRAM(S): at 21:08

## 2020-03-21 RX ADMIN — POLYETHYLENE GLYCOL 3350 17 GRAM(S): 17 POWDER, FOR SOLUTION ORAL at 12:40

## 2020-03-21 RX ADMIN — Medication 12.5 MILLIGRAM(S): at 06:26

## 2020-03-21 RX ADMIN — FAMOTIDINE 20 MILLIGRAM(S): 10 INJECTION INTRAVENOUS at 18:07

## 2020-03-21 RX ADMIN — SENNA PLUS 2 TABLET(S): 8.6 TABLET ORAL at 21:08

## 2020-03-21 RX ADMIN — AMIODARONE HYDROCHLORIDE 400 MILLIGRAM(S): 400 TABLET ORAL at 18:08

## 2020-03-21 RX ADMIN — CLOPIDOGREL BISULFATE 75 MILLIGRAM(S): 75 TABLET, FILM COATED ORAL at 12:37

## 2020-03-21 RX ADMIN — Medication 600 MILLIGRAM(S): at 18:07

## 2020-03-21 RX ADMIN — FAMOTIDINE 20 MILLIGRAM(S): 10 INJECTION INTRAVENOUS at 05:36

## 2020-03-21 RX ADMIN — ENOXAPARIN SODIUM 40 MILLIGRAM(S): 100 INJECTION SUBCUTANEOUS at 22:45

## 2020-03-21 RX ADMIN — Medication 40 MILLIGRAM(S): at 13:35

## 2020-03-21 RX ADMIN — WARFARIN SODIUM 2 MILLIGRAM(S): 2.5 TABLET ORAL at 21:07

## 2020-03-21 RX ADMIN — AMIODARONE HYDROCHLORIDE 400 MILLIGRAM(S): 400 TABLET ORAL at 05:36

## 2020-03-21 RX ADMIN — Medication 500 MICROGRAM(S): at 14:03

## 2020-03-21 RX ADMIN — Medication 0.12 MILLIGRAM(S): at 12:33

## 2020-03-21 RX ADMIN — Medication 100 GRAM(S): at 18:07

## 2020-03-21 RX ADMIN — ENOXAPARIN SODIUM 40 MILLIGRAM(S): 100 INJECTION SUBCUTANEOUS at 12:34

## 2020-03-21 RX ADMIN — Medication 100 MILLIEQUIVALENT(S): at 05:00

## 2020-03-21 RX ADMIN — Medication 600 MILLIGRAM(S): at 05:35

## 2020-03-21 RX ADMIN — HEPARIN SODIUM 5000 UNIT(S): 5000 INJECTION INTRAVENOUS; SUBCUTANEOUS at 05:36

## 2020-03-21 RX ADMIN — Medication 20 MILLIEQUIVALENT(S): at 13:36

## 2020-03-21 NOTE — PROGRESS NOTE ADULT - SUBJECTIVE AND OBJECTIVE BOX
INTERVAL HPI/OVERNIGHT EVENTS:  Patient in AF rate controlled in 80s, no tele events overnight. C/o non-productive cough.  Patient denies fever, chills, dizziness, syncope, chest pain, palpitations, SOB, abd pain, n/v/d/c, dysuria, hematuria or unusual rash.     MEDICATIONS  (STANDING):  aMIOdarone    Tablet 400 milliGRAM(s) Oral every 12 hours  aspirin enteric coated 81 milliGRAM(s) Oral daily  atorvastatin 80 milliGRAM(s) Oral at bedtime  chlorhexidine 4% Liquid 1 Application(s) Topical daily  clopidogrel Tablet 75 milliGRAM(s) Oral daily  dextrose 5%. 1000 milliLiter(s) (50 mL/Hr) IV Continuous <Continuous>  dextrose 50% Injectable 50 milliLiter(s) IV Push every 15 minutes  dextrose 50% Injectable 25 milliLiter(s) IV Push every 15 minutes  famotidine    Tablet 20 milliGRAM(s) Oral two times a day  guaiFENesin  milliGRAM(s) Oral every 12 hours  heparin  Injectable 5000 Unit(s) SubCutaneous every 8 hours  insulin glargine Injectable (LANTUS) 10 Unit(s) SubCutaneous every morning  insulin lispro (HumaLOG) corrective regimen sliding scale   SubCutaneous three times a day before meals  insulin lispro Injectable (HumaLOG) 3 Unit(s) SubCutaneous three times a day before meals  ipratropium    for Nebulization 500 MICROGram(s) Nebulizer every 6 hours  magnesium sulfate  IVPB 1 Gram(s) IV Intermittent every 12 hours  metoprolol tartrate 12.5 milliGRAM(s) Oral every 8 hours  polyethylene glycol 3350 17 Gram(s) Oral daily  senna 2 Tablet(s) Oral at bedtime  sodium chloride 0.9%. 1000 milliLiter(s) (20 mL/Hr) IV Continuous <Continuous>    MEDICATIONS  (PRN):  acetaminophen   Tablet .. 325 milliGRAM(s) Oral every 4 hours PRN Mild Pain (1 - 3)  dextrose 40% Gel 15 Gram(s) Oral once PRN Blood Glucose LESS THAN 70 milliGRAM(s)/deciliter  glucagon  Injectable 1 milliGRAM(s) IntraMuscular once PRN Glucose LESS THAN 70 milligrams/deciliter      Allergies  No Known Allergies    REVIEW OF SYSTEMS  A ten-point review of systems was otherwise negative except as noted.    Vital Signs Last 24 Hrs  T(C): 36.6 (21 Mar 2020 08:00), Max: 36.7 (21 Mar 2020 04:00)  T(F): 97.8 (21 Mar 2020 08:00), Max: 98 (21 Mar 2020 04:00)  HR: 88 (21 Mar 2020 08:00) (83 - 128)  BP: 87/54 (21 Mar 2020 08:00) (84/57 - 135/77)  BP(mean): 67 (21 Mar 2020 08:00) (65 - 98)  RR: 18 (21 Mar 2020 08:00) (13 - 26)  SpO2: 100% (21 Mar 2020 08:00) (51% - 100%)    03-20-20 @ 07:01  -  03-21-20 @ 07:00  --------------------------------------------------------  IN: 773.4 mL / OUT: 850 mL / NET: -76.6 mL    03-21-20 @ 07:01  -  03-21-20 @ 09:43  --------------------------------------------------------  IN: 240 mL / OUT: 0 mL / NET: 240 mL    Physical Exam  GENERAL: In no apparent distress, well nourished, and hydrated.  HEART: Regular rate and rhythm; No murmurs, rubs, or gallops.  PULMONARY: Clear to auscultation and perfusion.  No rales, wheezing, or rhonchi bilaterally.  CHEST: Surgical incision with steri-strips, c/d/i.   ABDOMEN: Soft, Nontender, Nondistended; Bowel sounds present  EXTREMITIES:  2+ Peripheral Pulses, No clubbing, cyanosis, or edema  NEUROLOGICAL: Grossly nonfocal    LABS:                        8.5    8.78  )-----------( 134      ( 21 Mar 2020 02:00 )             25.1     03-21    139  |  101  |  25<H>  ----------------------------<  84  3.8   |  27  |  0.9    Ca    8.2<L>      21 Mar 2020 02:00  Mg     2.9     03-21    TPro  6.0  /  Alb  4.0  /  TBili  1.9<H>  /  DBili  x   /  AST  52<H>  /  ALT  41  /  AlkPhos  82  03-20    PT/INR - ( 21 Mar 2020 02:00 )   PT: 18.50 sec;   INR: 1.61 ratio      RADIOLOGY & ADDITIONAL TESTS:  < from: Transthoracic Echocardiogram (03.20.20 @ 10:28) >  Summary:   1. Severely decreased segmental left ventricular systolic function.   2. Multiple left ventricular regional wall motion abnormalities exist. See wall motion findings.   3. LV Ejection Fraction by House's Method with a biplane EF of 26 %.   4. Normal left ventricular internal cavity size.   5. The left ventricular diastolic function could not be assessedin this study.   6. Mild aortic regurgitation.   7. Sclerotic aortic valve with normal opening.   8. LA volume Index is 34.0 ml/m² ml/m2.   9. There is mild aortic root calcification.    PHYSICIAN INTERPRETATION:  Left Ventricle: The left ventricularinternal cavity size is normal. Left ventricular wall thickness is normal. Severely decreased segmental left ventricular systolic function. The left ventricular diastolic function could not be assessed in this study.     LV Wall Scoring:  There is diffuse hypokinesis.    Right Ventricle: Normal right ventricular size and function.  Left Atrium: Mildly enlarged left atrium. LA volume Index is 34.0 ml/m² ml/m2.  Right Atrium: Normal right atrial size.  Pericardium: There is no evidence of pericardial effusion.  Mitral Valve: Structurally normal mitral valve, with normal leaflet excursion. The mitral valve is normal in structure. Mild mitral valve regurgitation is seen.  Tricuspid Valve: Structurally normal tricuspid valve, with normal leafletexcursion. The tricuspid valve is normal in structure. Trivial tricuspid regurgitation is visualized.  Aortic Valve: The aortic valve is trileaflet. Sclerotic aortic valve with normal opening. Mild aortic valve regurgitation is seen.  Pulmonic Valve:Structurally normal pulmonic valve, with normal leaflet excursion. The pulmonic valve is normal. No indication of pulmonic valve regurgitation.  Aorta: The aortic root and ascending aorta are structurally normal, with no evidence of dilitation. Thereis mild aortic root calcification.  Pulmonary Artery: The main pulmonary artery is normal in size.  Venous: The inferior vena cava was dilated, with respiratory size variation greater than 50%.    < end of copied text >    < from: 12 Lead ECG (03.21.20 @ 07:36) >  Ventricular Rate 88 BPM    Atrial Rate 163 BPM    QRS Duration 92 ms    Q-T Interval 406 ms    QTC Calculation(Bezet) 491 ms    R Axis -18 degrees    T Axis -76 degrees    Diagnosis Line Atrial fibrillation  Inferior infarct , age undetermined  Possible Anterior infarct , age undetermined  Abnormal ECG    < end of copied text >

## 2020-03-21 NOTE — PROGRESS NOTE ADULT - SUBJECTIVE AND OBJECTIVE BOX
OPERATIVE PROCEDURE(s):                POD # 5 s/p CABG                       85yMale  SURGEON(s): IFEANYI Garcia  SUBJECTIVE ASSESSMENT:   Vital Signs Last 24 Hrs  T(F): 97.8 (21 Mar 2020 08:00), Max: 98 (21 Mar 2020 04:00)  HR: 88 (21 Mar 2020 08:00) (83 - 128)  BP: 87/54 (21 Mar 2020 08:00) (84/57 - 135/77)  BP(mean): 67 (21 Mar 2020 08:00) (65 - 98)  ABP: 116/55 (20 Mar 2020 17:00) (98/83 - 126/55)  ABP(mean): 74 (20 Mar 2020 17:00)  RR: 18 (21 Mar 2020 08:00) (13 - 26)  SpO2: 100% (21 Mar 2020 08:00) (51% - 100%)  CVP(mm Hg): --  CVP(cm H2O): --  CO: --  CI: --  PA: --  SVR: --    I&O's Detail    20 Mar 2020 07:01  -  21 Mar 2020 07:00  --------------------------------------------------------  IN:    amiodarone Infusion: 33.4 mL    IV PiggyBack: 200 mL    Oral Fluid: 540 mL  Total IN: 773.4 mL    OUT:    Voided: 850 mL  Total OUT: 850 mL        Net:   I&O's Detail    19 Mar 2020 07:01  -  20 Mar 2020 07:00  --------------------------------------------------------  Total NET: -899.8 mL      20 Mar 2020 07:01  -  21 Mar 2020 07:00  --------------------------------------------------------  Total NET: -76.6 mL        CAPILLARY BLOOD GLUCOSE      POCT Blood Glucose.: 103 mg/dL (21 Mar 2020 06:44)  POCT Blood Glucose.: 117 mg/dL (20 Mar 2020 21:33)  POCT Blood Glucose.: 101 mg/dL (20 Mar 2020 16:15)  POCT Blood Glucose.: 122 mg/dL (20 Mar 2020 11:26)    Physical Exam:  General: NAD; A&Ox3  Cardiac: S1/S2, RRR, no murmur, no rubs  Lungs: unlabored respirations, CTA b/l, no wheeze, no rales, no crackles  Abdomen: Soft/NT/ND; positive bowel sounds x 4  Sternum: Intact, no click, incision healing well with no drainage  Incisions: Incisions clean/dry/intact  Extremities: No edema b/l lower extremities; good capillary refill; no cyanosis; palpable 1+ pedal pulses b/l    Central Venous Catheter: Yes[]  No[] , If Yes indication:           Day #  Biggs Catheter: Yes  [] , No  [] , If yes indication:                      Day #  NGT: Yes [] No [] ,    If Yes Placement:                                     Day #  EPICARDIAL WIRES:  [] YES [] NO                                              Day #  BOWEL MOVEMENT:  [] YES [] NO, If No, Timing since last BM:      Day #  CHEST TUBE(Left/Right):  [] YES [] NO, If yes -  AIR LEAKS:  [] YES [] NO        LABS:                        8.5<L>  8.78  )-----------( 134      ( 21 Mar 2020 02:00 )             25.1<L>                        9.6<L>  12.82<H> )-----------( 144      ( 20 Mar 2020 13:52 )             27.7<L>    03-21    139  |  101  |  25<H>  ----------------------------<  84  3.8   |  27  |  0.9  03-20    135  |  100  |  23<H>  ----------------------------<  124<H>  3.9   |  22  |  0.9    Ca    8.2<L>      21 Mar 2020 02:00  Mg     2.9     03-21    TPro  6.0 [6.0 - 8.0]  /  Alb  4.0 [3.5 - 5.2]  /  TBili  1.9<H> [0.2 - 1.2]  /  DBili  x   /  AST  52<H> [0 - 41]  /  ALT  41 [0 - 41]  /  AlkPhos  82 [30 - 115]  03-20    PT/INR - ( 21 Mar 2020 02:00 )   PT: ;   INR: 1.61 ratio         PT/INR - ( 20 Mar 2020 02:00 )   PT: ;   INR: 1.39 ratio             ABG - ( 20 Mar 2020 16:23 )  pH: 7.46  /  pCO2: 34    /  pO2: 90    / HCO3: 24    / Base Excess: 0.9   /  SaO2: 98    /  LA: 1.5              RADIOLOGY & ADDITIONAL TESTS:  CXR:  EKG:  MEDICATIONS  (STANDING):  aMIOdarone    Tablet 400 milliGRAM(s) Oral every 12 hours  aspirin enteric coated 81 milliGRAM(s) Oral daily  atorvastatin 80 milliGRAM(s) Oral at bedtime  chlorhexidine 4% Liquid 1 Application(s) Topical daily  clopidogrel Tablet 75 milliGRAM(s) Oral daily  dextrose 5%. 1000 milliLiter(s) (50 mL/Hr) IV Continuous <Continuous>  dextrose 50% Injectable 50 milliLiter(s) IV Push every 15 minutes  dextrose 50% Injectable 25 milliLiter(s) IV Push every 15 minutes  famotidine    Tablet 20 milliGRAM(s) Oral two times a day  guaiFENesin  milliGRAM(s) Oral every 12 hours  heparin  Injectable 5000 Unit(s) SubCutaneous every 8 hours  insulin glargine Injectable (LANTUS) 10 Unit(s) SubCutaneous every morning  insulin lispro (HumaLOG) corrective regimen sliding scale   SubCutaneous three times a day before meals  insulin lispro Injectable (HumaLOG) 3 Unit(s) SubCutaneous three times a day before meals  ipratropium    for Nebulization 500 MICROGram(s) Nebulizer every 6 hours  magnesium sulfate  IVPB 1 Gram(s) IV Intermittent every 12 hours  metoprolol tartrate 12.5 milliGRAM(s) Oral every 8 hours  polyethylene glycol 3350 17 Gram(s) Oral daily  senna 2 Tablet(s) Oral at bedtime  sodium chloride 0.9%. 1000 milliLiter(s) (20 mL/Hr) IV Continuous <Continuous>    MEDICATIONS  (PRN):  acetaminophen   Tablet .. 325 milliGRAM(s) Oral every 4 hours PRN Mild Pain (1 - 3)  dextrose 40% Gel 15 Gram(s) Oral once PRN Blood Glucose LESS THAN 70 milliGRAM(s)/deciliter  glucagon  Injectable 1 milliGRAM(s) IntraMuscular once PRN Glucose LESS THAN 70 milligrams/deciliter    HEPARIN:  [] YES [] NO  Dose: XX UNITS/HR UNITS Q8H  LOVENOX:[] YES [] NO  Dose: XX mg Q24H  COUMADIN: []  YES [] NO  Dose: XX mg  Q24H  SCD's: YES b/l  GI Prophylaxis: Protonix [], Pepcid [], None [], (Contra-indication:.....)    Post-Op Beta-Blockers: Yes [], No[], If No, then contraindication:  Post-Op Aspirin: Yes [],  No [], If No, then contraindication:  Post-Op Statin: Yes [], No[], If No, then contraindication:  Allergies    No Known Allergies    Intolerances      Ambulation/Activity Status: with rolling walker    Assessment/Plan:  85y Male status-post CABG4              POD # 5  - Case and plan discussed with CTU Intensivist and CT Surgeon - Dr. Garcia  - Continue CTU supportive care and ongoing plan of care as per continuing CTU rounds.    - Continue GI prophylaxis  - start DVT prophylaxis with SC heparin  - Incentive Spirometry 10 times an hour  - Continue to advance physical activity as tolerated and continue PT/OT as directed  1. CAD: Continue ASA 81, statin and increase BB to Q 8.  2. A. Fib: stop amiodarone gtt started PO Amiodarone, coumadin 1 mg tonight  - repeat EF 26% on TTE, will need a life vest on discharge ([facility] notified)  - if A-fib persists may consider adding Digoxin  - RX for rolling walker written out for anticipated discharge this weekend    Social Service Disposition:   Home OPERATIVE PROCEDURE(s):                POD # 5 s/p CABG                       85yMale  SURGEON(s): IFEANYI Garcia  SUBJECTIVE ASSESSMENT: progressing slowly, still with a-fib  Vital Signs Last 24 Hrs  T(F): 97.8 (21 Mar 2020 08:00), Max: 98 (21 Mar 2020 04:00)  HR: 88 (21 Mar 2020 08:00) (83 - 128)  BP: 87/54 (21 Mar 2020 08:00) (84/57 - 135/77)  BP(mean): 67 (21 Mar 2020 08:00) (65 - 98)  ABP: 116/55 (20 Mar 2020 17:00) (98/83 - 126/55)  ABP(mean): 74 (20 Mar 2020 17:00)  RR: 18 (21 Mar 2020 08:00) (13 - 26)  SpO2: 100% (21 Mar 2020 08:00) (51% - 100%)    I&O's Detail    20 Mar 2020 07:01  -  21 Mar 2020 07:00  --------------------------------------------------------  IN:    amiodarone Infusion: 33.4 mL    IV PiggyBack: 200 mL    Oral Fluid: 540 mL  Total IN: 773.4 mL    OUT:    Voided: 850 mL  Total OUT: 850 mL    Net:   I&O's Detail    19 Mar 2020 07:01  -  20 Mar 2020 07:00  --------------------------------------------------------  Total NET: -899.8 mL    20 Mar 2020 07:01  -  21 Mar 2020 07:00  --------------------------------------------------------  Total NET: -76.6 mL        CAPILLARY BLOOD GLUCOSE    POCT Blood Glucose.: 103 mg/dL (21 Mar 2020 06:44)  POCT Blood Glucose.: 117 mg/dL (20 Mar 2020 21:33)  POCT Blood Glucose.: 101 mg/dL (20 Mar 2020 16:15)  POCT Blood Glucose.: 122 mg/dL (20 Mar 2020 11:26)    Physical Exam:  General: NAD; A&Ox3  Cardiac: S1/S2, ireg rate and rhytme, , no murmur, no rubs  Lungs: unlabored shallow  respirations, bilateral bs with decreased bs at left base  Abdomen: Soft/NT/ND;  Sternum: Intact, no click, incision healing well with no drainage  Incisions: Incisions clean/dry/intact  Extremities: mild edema b/l lower extremities    Central Venous Catheter: Yes[] remove today  BOWEL MOVEMENT:  [] YES      LABS:                  8.5<L>  8.78  )-----------( 134      ( 21 Mar 2020 02:00 )             25.1<L>                        9.6<L>  12.82<H> )-----------( 144      ( 20 Mar 2020 13:52 )             27.7<L>    03-21    139  |  101  |  25<H>  ----------------------------<  84  3.8   |  27  |  0.9  03-20    135  |  100  |  23<H>  ----------------------------<  124<H>  3.9   |  22  |  0.9    Ca    8.2<L>      21 Mar 2020 02:00  Mg     2.9     03-21    TPro  6.0 [6.0 - 8.0]  /  Alb  4.0 [3.5 - 5.2]  /  TBili  1.9<H> [0.2 - 1.2]  /  DBili  x   /  AST  52<H> [0 - 41]  /  ALT  41 [0 - 41]  /  AlkPhos  82 [30 - 115]  03-20    PT/INR - ( 21 Mar 2020 02:00 )   PT: ;   INR: 1.61 ratio       PT/INR - ( 20 Mar 2020 02:00 )   PT: ;   INR: 1.39 ratio        ABG - ( 20 Mar 2020 16:23 )  pH: 7.46  /  pCO2: 34    /  pO2: 90    / HCO3: 24    / Base Excess: 0.9   /  SaO2: 98    /  LA: 1.5      RADIOLOGY & ADDITIONAL TESTS:   from: Xray Chest 1 View- PORTABLE-Routine (03.21.20 @ 06:21) >  Impression:      Status post a median sternotomy and cardiomegaly, unchanged.    Worsening bilateral opacities.    Right IJ line.    < end of copied text >    EKG: < from: 12 Lead ECG (03.21.20 @ 07:36) >  Ventricular Rate 88 BPM    Atrial Rate 163 BPM    QRS Duration 92 ms    Q-T Interval 406 ms    QTC Calculation(Bezet) 491 ms    R Axis -18 degrees    T Axis -76 degrees    Diagnosis Line Atrial fibrillation  Inferior infarct , age undetermined  Possible Anterior infarct , age undetermined  Abnormal ECG    < end of copied text >    MEDICATIONS  (STANDING):  aMIOdarone    Tablet 400 milliGRAM(s) Oral every 12 hours  aspirin enteric coated 81 milliGRAM(s) Oral daily  atorvastatin 80 milliGRAM(s) Oral at bedtime  chlorhexidine 4% Liquid 1 Application(s) Topical daily  clopidogrel Tablet 75 milliGRAM(s) Oral daily  dextrose 5%. 1000 milliLiter(s) (50 mL/Hr) IV Continuous <Continuous>  dextrose 50% Injectable 50 milliLiter(s) IV Push every 15 minutes  dextrose 50% Injectable 25 milliLiter(s) IV Push every 15 minutes  famotidine    Tablet 20 milliGRAM(s) Oral two times a day  guaiFENesin  milliGRAM(s) Oral every 12 hours  heparin  Injectable 5000 Unit(s) SubCutaneous every 8 hours  insulin glargine Injectable (LANTUS) 10 Unit(s) SubCutaneous every morning  insulin lispro (HumaLOG) corrective regimen sliding scale   SubCutaneous three times a day before meals  insulin lispro Injectable (HumaLOG) 3 Unit(s) SubCutaneous three times a day before meals  ipratropium    for Nebulization 500 MICROGram(s) Nebulizer every 6 hours  magnesium sulfate  IVPB 1 Gram(s) IV Intermittent every 12 hours  metoprolol tartrate 12.5 milliGRAM(s) Oral every 8 hours  polyethylene glycol 3350 17 Gram(s) Oral daily  senna 2 Tablet(s) Oral at bedtime  sodium chloride 0.9%. 1000 milliLiter(s) (20 mL/Hr) IV Continuous <Continuous>    MEDICATIONS  (PRN):  acetaminophen   Tablet .. 325 milliGRAM(s) Oral every 4 hours PRN Mild Pain (1 - 3)  dextrose 40% Gel 15 Gram(s) Oral once PRN Blood Glucose LESS THAN 70 milliGRAM(s)/deciliter  glucagon  Injectable 1 milliGRAM(s) IntraMuscular once PRN Glucose LESS THAN 70 milligrams/deciliter    Allergies    No Known Allergies    Intolerances      Ambulation/Activity Status: with rolling walker    Assessment/Plan:  85y Male status-post CABG4              POD # 5  - Case and plan discussed with CTU Intensivist and CT Surgeon -   - Continue CTU supportive care and ongoing plan of care as per continuing CTU rounds.    - Continue GI prophylaxis  - stop SC heparin, start Lovenox 40 mg BID till PT therapeutic   - Incentive Spirometry 10 times an hour  - Continue to advance physical activity as tolerated and continue PT/OT as directed  1. CAD: stop ASA 81, continue with Plavix 75 mg,, statin and increase BB .  2. A. Fib: continue PO amiodarone, start Digoxin 0.125 daily, give coumadin 2 mg tonight  - repeat EF 26% on TTE, awaiting life vest application ([facility] notified)  - RX for rolling walker written and walker at bedside  - pulmonary congestion will give 40mg of Lasix with 20 MEq of K  - needs appointment for coumadin clinic prior to discharge  - will stop basal and prandial insulin, will keep sliding scale, pre-op a1C was 6.1 and glucose has been controlled    Social Service Disposition:   Home early next week

## 2020-03-21 NOTE — PROGRESS NOTE ADULT - ASSESSMENT
PROBLEMS:  I spent 45 minutes of time examining patient, reviewing vitals, labs, medications, imaging and discussing with the team goals of care to prevent life-threatening in this patient who is at high risk for deterioration or death due to:    1.	CAD - s/p CABG lopressor 12.5 q 12, lipitor 80, plavix 75, stop ASA  2.	Paroxysmal A.Fib - coumadin 2 mg, Lovenox 40 q 12, Amiodarone, add Digoxin  3.	d/c intro  4.	hyperglycemia - stop lantus/lispro - only sliding scale    PLAN  Neuro: move all 4 extremities. no sensory or motor deficits  Pain management.   acetaminophen   Tablet .. 325 milliGRAM(s) Oral every 4 hours PRN    Pulm: Wean off supplemental oxygen as able. Daily CXR. Encourage coughing, deep breathing and use of incentive spirometry.   guaiFENesin  milliGRAM(s) Oral every 12 hours  ipratropium    for Nebulization 500 MICROGram(s) Nebulizer every 6 hours    Cardio: Monitor telemetry/alarms. Continue supportive care   aMIOdarone    Tablet 400 milliGRAM(s) Oral every 12 hours  digoxin     Tablet 0.125 milliGRAM(s) Oral daily  metoprolol tartrate 12.5 milliGRAM(s) Oral every 8 hours    GI: Continue stool softeners.    famotidine    Tablet 20 milliGRAM(s) Oral two times a day  senna 2 Tablet(s) Oral at bedtime    Nutrition: Continue present diet  Endocrine and glucose control:   atorvastatin 80 milliGRAM(s) Oral at bedtime  dextrose 40% Gel 15 Gram(s) Oral once PRN  glucagon  Injectable 1 milliGRAM(s) IntraMuscular once PRN  insulin glargine Injectable (LANTUS) 10 Unit(s) SubCutaneous every morning  insulin lispro (HumaLOG) corrective regimen sliding scale   SubCutaneous three times a day before meals  insulin lispro Injectable (HumaLOG) 3 Unit(s) SubCutaneous three times a day before meals    Renal: monitor urine output, supplement electrolytes as needed,     Vasc: Heparin SC and/or SCDs for DVT prophylaxis  clopidogrel Tablet 75 milliGRAM(s) Oral daily  enoxaparin Injectable 40 milliGRAM(s) SubCutaneous two times a day  warfarin 2 milliGRAM(s) Oral once    ID: Stable, no fever , no chills. Off antibiotics.    Therapy: OOB/ambulate  Disposition: start planing discharge home or placement    Pertinent clinical, laboratory, radiographic, hemodynamic, echocardiographic, respiratory data, microbiologic data and chart were reviewed and analyzed frequently throughout the course of the day and night. GI and DVT prophylaxis, glycemic control, head of bed elevation and skin care issues were addressed.  Patient seen, examined and plan discussed with CT Surgery / CTICU team during rounds.    [ ] The patient remains in critical and unstable condition, and requires ICU care and monitoring  [x ] The patient is improving but requires continued monitoring and adjustment of therapy PROBLEMS:  I spent 45 minutes of time examining patient, reviewing vitals, labs, medications, imaging and discussing with the team goals of care to prevent life-threatening in this patient who is at high risk for deterioration or death due to:    1.	CAD - s/p CABG lopressor 12.5 q 12, lipitor 80, plavix 75, stop ASA  2.	Paroxysmal A.Fib - coumadin 2 mg, Lovenox 40 q 12, Amiodarone, add Digoxin  3.	Chronic systolic heart failure - Life Vest today - when BP stable should consider ACE inhibitor  4.	d/c intro  5.	hyperglycemia - stop lantus/lispro - only sliding scale    PLAN  Neuro: move all 4 extremities. no sensory or motor deficits  Pain management.   acetaminophen   Tablet .. 325 milliGRAM(s) Oral every 4 hours PRN    Pulm: Wean off supplemental oxygen as able. Daily CXR. Encourage coughing, deep breathing and use of incentive spirometry.   guaiFENesin  milliGRAM(s) Oral every 12 hours  ipratropium    for Nebulization 500 MICROGram(s) Nebulizer every 6 hours    Cardio: Monitor telemetry/alarms. Continue supportive care   aMIOdarone    Tablet 400 milliGRAM(s) Oral every 12 hours  digoxin     Tablet 0.125 milliGRAM(s) Oral daily  metoprolol tartrate 12.5 milliGRAM(s) Oral every 8 hours    GI: Continue stool softeners.    famotidine    Tablet 20 milliGRAM(s) Oral two times a day  senna 2 Tablet(s) Oral at bedtime    Nutrition: Continue present diet  Endocrine and glucose control:   atorvastatin 80 milliGRAM(s) Oral at bedtime  dextrose 40% Gel 15 Gram(s) Oral once PRN  glucagon  Injectable 1 milliGRAM(s) IntraMuscular once PRN  insulin glargine Injectable (LANTUS) 10 Unit(s) SubCutaneous every morning  insulin lispro (HumaLOG) corrective regimen sliding scale   SubCutaneous three times a day before meals  insulin lispro Injectable (HumaLOG) 3 Unit(s) SubCutaneous three times a day before meals    Renal: monitor urine output, supplement electrolytes as needed,     Vasc: Heparin SC and/or SCDs for DVT prophylaxis  clopidogrel Tablet 75 milliGRAM(s) Oral daily  enoxaparin Injectable 40 milliGRAM(s) SubCutaneous two times a day  warfarin 2 milliGRAM(s) Oral once    ID: Stable, no fever , no chills. Off antibiotics.    Therapy: OOB/ambulate  Disposition: start planing discharge home or placement    Pertinent clinical, laboratory, radiographic, hemodynamic, echocardiographic, respiratory data, microbiologic data and chart were reviewed and analyzed frequently throughout the course of the day and night. GI and DVT prophylaxis, glycemic control, head of bed elevation and skin care issues were addressed.  Patient seen, examined and plan discussed with CT Surgery / CTICU team during rounds.    [ ] The patient remains in critical and unstable condition, and requires ICU care and monitoring  [x ] The patient is improving but requires continued monitoring and adjustment of therapy

## 2020-03-21 NOTE — PROGRESS NOTE ADULT - ASSESSMENT
This is a 85y Male with h/o pancreatic and liver cancer in remission since 40 years ago, recent paroxysmal AF, STEMI, s/p RCA stent <30days (2/2020) who presents with TvCAD, now S/p off pump CABG x 4 on 3/16. Post op with AF with RVR, received Amio bolus x2 f/b drip, Procainamide, unsuccessful DCCV x2.     Impression:  Triple vessel CAD s/p off pump CABG x 4, now POD#5  ICM EF 31%  AF RVR, now rate controlled    Plan:  - Recommend wearable defibrillator  - F/U with Dr. Connors in 3 months to repeat echo and re-assess need for ICD if EF does not improve  - Cont Warfarin   - Cont amiodarone taper  - Cont digoxin and metoprolol - may need dose adjustment d/t hypotension    Will discuss with EP attending

## 2020-03-22 LAB
ANION GAP SERPL CALC-SCNC: 10 MMOL/L — SIGNIFICANT CHANGE UP (ref 7–14)
BASOPHILS # BLD AUTO: 0.01 K/UL — SIGNIFICANT CHANGE UP (ref 0–0.2)
BASOPHILS NFR BLD AUTO: 0.1 % — SIGNIFICANT CHANGE UP (ref 0–1)
BUN SERPL-MCNC: 22 MG/DL — HIGH (ref 10–20)
CALCIUM SERPL-MCNC: 8.3 MG/DL — LOW (ref 8.5–10.1)
CHLORIDE SERPL-SCNC: 99 MMOL/L — SIGNIFICANT CHANGE UP (ref 98–110)
CO2 SERPL-SCNC: 28 MMOL/L — SIGNIFICANT CHANGE UP (ref 17–32)
CREAT SERPL-MCNC: 1 MG/DL — SIGNIFICANT CHANGE UP (ref 0.7–1.5)
EOSINOPHIL # BLD AUTO: 0.02 K/UL — SIGNIFICANT CHANGE UP (ref 0–0.7)
EOSINOPHIL NFR BLD AUTO: 0.3 % — SIGNIFICANT CHANGE UP (ref 0–8)
GLUCOSE BLDC GLUCOMTR-MCNC: 106 MG/DL — HIGH (ref 70–99)
GLUCOSE BLDC GLUCOMTR-MCNC: 112 MG/DL — HIGH (ref 70–99)
GLUCOSE BLDC GLUCOMTR-MCNC: 96 MG/DL — SIGNIFICANT CHANGE UP (ref 70–99)
GLUCOSE SERPL-MCNC: 96 MG/DL — SIGNIFICANT CHANGE UP (ref 70–99)
HCT VFR BLD CALC: 27.8 % — LOW (ref 42–52)
HGB BLD-MCNC: 9.3 G/DL — LOW (ref 14–18)
IMM GRANULOCYTES NFR BLD AUTO: 0.4 % — HIGH (ref 0.1–0.3)
INR BLD: 1.54 RATIO — HIGH (ref 0.65–1.3)
LYMPHOCYTES # BLD AUTO: 1.61 K/UL — SIGNIFICANT CHANGE UP (ref 1.2–3.4)
LYMPHOCYTES # BLD AUTO: 20.5 % — SIGNIFICANT CHANGE UP (ref 20.5–51.1)
MAGNESIUM SERPL-MCNC: 2.5 MG/DL — HIGH (ref 1.8–2.4)
MCHC RBC-ENTMCNC: 31.6 PG — HIGH (ref 27–31)
MCHC RBC-ENTMCNC: 33.5 G/DL — SIGNIFICANT CHANGE UP (ref 32–37)
MCV RBC AUTO: 94.6 FL — HIGH (ref 80–94)
MONOCYTES # BLD AUTO: 0.79 K/UL — HIGH (ref 0.1–0.6)
MONOCYTES NFR BLD AUTO: 10 % — HIGH (ref 1.7–9.3)
NEUTROPHILS # BLD AUTO: 5.41 K/UL — SIGNIFICANT CHANGE UP (ref 1.4–6.5)
NEUTROPHILS NFR BLD AUTO: 68.7 % — SIGNIFICANT CHANGE UP (ref 42.2–75.2)
NRBC # BLD: 0 /100 WBCS — SIGNIFICANT CHANGE UP (ref 0–0)
PLATELET # BLD AUTO: 162 K/UL — SIGNIFICANT CHANGE UP (ref 130–400)
POTASSIUM SERPL-MCNC: 3.9 MMOL/L — SIGNIFICANT CHANGE UP (ref 3.5–5)
POTASSIUM SERPL-SCNC: 3.9 MMOL/L — SIGNIFICANT CHANGE UP (ref 3.5–5)
PROTHROM AB SERPL-ACNC: 17.7 SEC — HIGH (ref 9.95–12.87)
RBC # BLD: 2.94 M/UL — LOW (ref 4.7–6.1)
RBC # FLD: 15.6 % — HIGH (ref 11.5–14.5)
SODIUM SERPL-SCNC: 137 MMOL/L — SIGNIFICANT CHANGE UP (ref 135–146)
WBC # BLD: 7.87 K/UL — SIGNIFICANT CHANGE UP (ref 4.8–10.8)
WBC # FLD AUTO: 7.87 K/UL — SIGNIFICANT CHANGE UP (ref 4.8–10.8)

## 2020-03-22 PROCEDURE — 93010 ELECTROCARDIOGRAM REPORT: CPT

## 2020-03-22 PROCEDURE — 99233 SBSQ HOSP IP/OBS HIGH 50: CPT

## 2020-03-22 PROCEDURE — 71045 X-RAY EXAM CHEST 1 VIEW: CPT | Mod: 26

## 2020-03-22 PROCEDURE — 99222 1ST HOSP IP/OBS MODERATE 55: CPT

## 2020-03-22 RX ORDER — POTASSIUM CHLORIDE 20 MEQ
10 PACKET (EA) ORAL DAILY
Refills: 0 | Status: DISCONTINUED | OUTPATIENT
Start: 2020-03-22 | End: 2020-03-24

## 2020-03-22 RX ORDER — FUROSEMIDE 40 MG
20 TABLET ORAL DAILY
Refills: 0 | Status: DISCONTINUED | OUTPATIENT
Start: 2020-03-22 | End: 2020-03-24

## 2020-03-22 RX ORDER — DIGOXIN 250 MCG
0.5 TABLET ORAL ONCE
Refills: 0 | Status: COMPLETED | OUTPATIENT
Start: 2020-03-22 | End: 2020-03-22

## 2020-03-22 RX ORDER — WARFARIN SODIUM 2.5 MG/1
3 TABLET ORAL ONCE
Refills: 0 | Status: COMPLETED | OUTPATIENT
Start: 2020-03-22 | End: 2020-03-22

## 2020-03-22 RX ORDER — POTASSIUM CHLORIDE 20 MEQ
20 PACKET (EA) ORAL ONCE
Refills: 0 | Status: COMPLETED | OUTPATIENT
Start: 2020-03-22 | End: 2020-03-22

## 2020-03-22 RX ORDER — AMIODARONE HYDROCHLORIDE 400 MG/1
200 TABLET ORAL
Refills: 0 | Status: DISCONTINUED | OUTPATIENT
Start: 2020-03-22 | End: 2020-03-24

## 2020-03-22 RX ADMIN — Medication 0.5 MILLIGRAM(S): at 12:07

## 2020-03-22 RX ADMIN — Medication 12.5 MILLIGRAM(S): at 21:11

## 2020-03-22 RX ADMIN — POLYETHYLENE GLYCOL 3350 17 GRAM(S): 17 POWDER, FOR SOLUTION ORAL at 12:07

## 2020-03-22 RX ADMIN — ATORVASTATIN CALCIUM 80 MILLIGRAM(S): 80 TABLET, FILM COATED ORAL at 21:12

## 2020-03-22 RX ADMIN — WARFARIN SODIUM 3 MILLIGRAM(S): 2.5 TABLET ORAL at 21:11

## 2020-03-22 RX ADMIN — Medication 500 MICROGRAM(S): at 14:39

## 2020-03-22 RX ADMIN — Medication 500 MICROGRAM(S): at 19:45

## 2020-03-22 RX ADMIN — Medication 20 MILLIEQUIVALENT(S): at 05:35

## 2020-03-22 RX ADMIN — ENOXAPARIN SODIUM 40 MILLIGRAM(S): 100 INJECTION SUBCUTANEOUS at 05:35

## 2020-03-22 RX ADMIN — Medication 10 MILLIEQUIVALENT(S): at 19:23

## 2020-03-22 RX ADMIN — Medication 600 MILLIGRAM(S): at 05:35

## 2020-03-22 RX ADMIN — SENNA PLUS 2 TABLET(S): 8.6 TABLET ORAL at 21:11

## 2020-03-22 RX ADMIN — Medication 500 MICROGRAM(S): at 08:46

## 2020-03-22 RX ADMIN — ENOXAPARIN SODIUM 40 MILLIGRAM(S): 100 INJECTION SUBCUTANEOUS at 18:22

## 2020-03-22 RX ADMIN — AMIODARONE HYDROCHLORIDE 200 MILLIGRAM(S): 400 TABLET ORAL at 18:22

## 2020-03-22 RX ADMIN — Medication 12.5 MILLIGRAM(S): at 05:35

## 2020-03-22 RX ADMIN — Medication 600 MILLIGRAM(S): at 18:22

## 2020-03-22 RX ADMIN — AMIODARONE HYDROCHLORIDE 200 MILLIGRAM(S): 400 TABLET ORAL at 05:35

## 2020-03-22 RX ADMIN — CLOPIDOGREL BISULFATE 75 MILLIGRAM(S): 75 TABLET, FILM COATED ORAL at 12:07

## 2020-03-22 RX ADMIN — Medication 100 GRAM(S): at 17:00

## 2020-03-22 RX ADMIN — Medication 20 MILLIGRAM(S): at 18:22

## 2020-03-22 RX ADMIN — FAMOTIDINE 20 MILLIGRAM(S): 10 INJECTION INTRAVENOUS at 18:22

## 2020-03-22 RX ADMIN — Medication 100 GRAM(S): at 05:35

## 2020-03-22 RX ADMIN — FAMOTIDINE 20 MILLIGRAM(S): 10 INJECTION INTRAVENOUS at 05:35

## 2020-03-22 RX ADMIN — Medication 0.12 MILLIGRAM(S): at 06:18

## 2020-03-22 NOTE — CONSULT NOTE ADULT - SUBJECTIVE AND OBJECTIVE BOX
S :   85y year old Male seen at bedside  with a chief complaint of   painful thickened, dystrophic, ingrowing  and long toenails digits 1-5 bilaterally  and preventative foot examination. Patient is medically managed  by CTu team.  Patient denies any history o f trauma to both feet.  Patient has no other pedal complaints.       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  High blood cholesterol  Heart attack  No pertinent past medical history    PSH:H/O hernia repair  H/O heart artery stent  No significant past surgical history      Allergies:No Known Allergies      Labs:                        9.3    7.87  )-----------( 162      ( 22 Mar 2020 02:00 )             27.8       WBC Trend  7.87 Date (03-22 @ 02:00)  8.78 Date (03-21 @ 02:00)  12.82<H> Date (03-20 @ 13:52)  9.52 Date (03-20 @ 02:00)  7.95 Date (03-19 @ 02:24)  12.12<H> Date (03-18 @ 01:30)  7.67 Date (03-17 @ 16:14)  9.83 Date (03-17 @ 01:35)  11.07<H> Date (03-16 @ 18:00)  5.04 Date (03-10 @ 12:14)      Chem  03-22    137  |  99  |  22<H>  ----------------------------<  96  3.9   |  28  |  1.0    Ca    8.3<L>      22 Mar 2020 02:00  Mg     2.5     03-22    TPro  6.0  /  Alb  4.0  /  TBili  1.9<H>  /  DBili  x   /  AST  52<H>  /  ALT  41  /  AlkPhos  82  03-20          T(F): 97.2 (03-22-20 @ 07:00), Max: 97.9 (03-21-20 @ 20:00)  HR: 118 (03-22-20 @ 10:07) (94 - 132)  BP: 105/70 (03-22-20 @ 10:07) (82/52 - 120/72)  RR: 19 (03-22-20 @ 10:07) (16 - 27)  SpO2: 98% (03-22-20 @ 10:07) (72% - 100%)  Wt(kg): --    O:   Integument:  Skin warm, dry and supple bilateral.  No open lesions or inter-digital macerations noted bilateral.   Toenails 1-5 Right and Left feet thickened, elongated, discolored, and dystrophic with subungual debris. There is pain upon palpation of all fungal and ingrowing nails 1-5 bilaterally.   Vascular: Dorsalis Pedis and Posterior Tibial pulses diminished  Capillary re-fill time less than 3 seconds digits 1-5 bilateral.   Neuro: Protective sensation intact to the level of the digits bilateral.  MSK: Muscle strength 5/5 all major muscle groups bilateral. No structural abnormality, bilaterally      A:   1. bilateral hypertrohic Onychomycosis digits 1-5   2. Pain from Elongated nails, ingrowing  and dystrophic nails  P: Discussed diagnosis and treatment with patient  Aseptic debridement and curretage  of all fungal and ingrowing  nails 1-5 bilateral with sterile nail pack  Please re-consult as needed.

## 2020-03-22 NOTE — PROGRESS NOTE ADULT - SUBJECTIVE AND OBJECTIVE BOX
CTU Attending Progress Daily Note     22 Mar 2020 10:14    Procedure:           CABG                                       POD#     6              Patient seen as post-op critical care follow-up    HPI:    See preop testing chart H&P    Interval event for past 24 hr:  LOKESH HUNT  85y had Afib    Current Complains:  LOKESH HUNT has no new complaints    REVIEW OF SYSTEMS:  CONSTITUTIONAL:  [-] weakness, [-] fevers, [-] chills  EYES/ENT: [-] visual changes, [-] vertigo, [-] throat pain   NECK: [-] pain, [-] stiffness  RESPIRATORY: [-] cough, [-] wheezing, [-] hemoptysis, [-] shortness of breath  CARDIOVASCULAR: [-] chest pain, [-] palpitations, [-] orthopnea  GASTROINTESTINAL:    [-]abdominal pain, [-] nausea, [-] vomiting, [-] hematemesis, [-] diarrhea, [-] constipation, [-] melena, [-] hematochezia.  GENITOURINARY: [-] dysuria, [-] frequency, [-] hematuria  NEUROLOGICAL: [-] numbness, [-] weakness  SKIN: [-] itching, [-] burning, [-] rashes, [-] lesions   All other review of systems is negative unless indicated above.    [  ] Unable to assess ROS because :    OBJECTIVE:  ICU Vital Signs Last 24 Hrs  T(C): 36.2 (22 Mar 2020 07:00), Max: 36.6 (21 Mar 2020 20:00)  T(F): 97.2 (22 Mar 2020 07:00), Max: 97.9 (21 Mar 2020 20:00)  HR: 118 (22 Mar 2020 10:07) (94 - 132)  BP: 105/70 (22 Mar 2020 10:07) (82/52 - 120/72)  BP(mean): 83 (22 Mar 2020 10:07) (61 - 89)  ABP: --  ABP(mean): --  RR: 19 (22 Mar 2020 10:07) (16 - 27)  SpO2: 98% (22 Mar 2020 10:07) (72% - 100%)      I&O's Summary    21 Mar 2020 07:01  -  22 Mar 2020 07:00  --------------------------------------------------------  IN: 1400 mL / OUT: 2700 mL / NET: -1300 mL    22 Mar 2020 07:01  -  22 Mar 2020 10:14  --------------------------------------------------------  IN: 240 mL / OUT: 100 mL / NET: 140 mL      PHYSICAL EXAM:  General: WN/WD NAD    HEENT:     [+] NCAT  [+] EOMI  [-] Conjuctival edema   [-] Icterus   [-] Thrush   [-] ETT  [-] NGT/OGT    Neck:         [+] FROM   [-] JVD     [-] Nodes     [-] Masses    [+] Mid-line trachea    [-] Tracheostomy    Chest:         [-] Sternal click   [-] Sternal drainage   [-] Pacing wires   [-] Chest tubes   [-] SubQ emphysema    Lungs:          [+] CTA   [-] Rhonchi   [-] Rales    [-] Wheezing    [-] Decreased BS    [-] Dullness R L    Cardiac:       [+] S1 [+] S2    [+] RRR   [-] Irregular   [-] S3   [-] S4    [-] Murmurs    [-] Rub    Abdomen:    [+] BS    [+] Soft    [+] Non-tender     [-] Distended    [-] Organomegaly  [-] PEG    Extremities:   [-] Cyanosis U/L   [-] Clubbing  U/L  [-] LE/UE Edema   [+] Capillary refill    [+] Pulses     Neuro:        [+] Awake   [+]  Alert   [-] Confused   [-] Lethargic   [-] Sedated   [-] Generalized Weakness    Skin:        [-] Rashes    [-] Erythema   [+] Normal incisions   [+] IV sites intact   [-] Sacral decubitus    Tubes:  LINES:    CAPILLARY BLOOD GLUCOSE      POCT Blood Glucose.: 112 mg/dL (22 Mar 2020 06:47)    CAPILLARY BLOOD GLUCOSE      POCT Blood Glucose.: 112 mg/dL (22 Mar 2020 06:47)  POCT Blood Glucose.: 101 mg/dL (21 Mar 2020 16:11)  POCT Blood Glucose.: 84 mg/dL (21 Mar 2020 11:06)      HOSPITAL MEDICATIONS:  MEDICATIONS  (STANDING):  aMIOdarone    Tablet 200 milliGRAM(s) Oral daily  atorvastatin 80 milliGRAM(s) Oral at bedtime  chlorhexidine 4% Liquid 1 Application(s) Topical daily  clopidogrel Tablet 75 milliGRAM(s) Oral daily  dextrose 5%. 1000 milliLiter(s) (50 mL/Hr) IV Continuous <Continuous>  dextrose 50% Injectable 50 milliLiter(s) IV Push every 15 minutes  dextrose 50% Injectable 25 milliLiter(s) IV Push every 15 minutes  digoxin     Tablet 0.125 milliGRAM(s) Oral daily  enoxaparin Injectable 40 milliGRAM(s) SubCutaneous two times a day  famotidine    Tablet 20 milliGRAM(s) Oral two times a day  guaiFENesin  milliGRAM(s) Oral every 12 hours  insulin lispro (HumaLOG) corrective regimen sliding scale   SubCutaneous three times a day before meals  ipratropium    for Nebulization 500 MICROGram(s) Nebulizer every 6 hours  magnesium sulfate  IVPB 1 Gram(s) IV Intermittent every 12 hours  metoprolol tartrate 12.5 milliGRAM(s) Oral every 8 hours  polyethylene glycol 3350 17 Gram(s) Oral daily  senna 2 Tablet(s) Oral at bedtime  sodium chloride 0.9%. 1000 milliLiter(s) (20 mL/Hr) IV Continuous <Continuous>    MEDICATIONS  (PRN):  acetaminophen   Tablet .. 325 milliGRAM(s) Oral every 4 hours PRN Mild Pain (1 - 3)  dextrose 40% Gel 15 Gram(s) Oral once PRN Blood Glucose LESS THAN 70 milliGRAM(s)/deciliter  glucagon  Injectable 1 milliGRAM(s) IntraMuscular once PRN Glucose LESS THAN 70 milligrams/deciliter      LABS:  ABG - ( 20 Mar 2020 16:23 )  pH, Arterial: 7.46  pH, Blood: x     /  pCO2: 34    /  pO2: 90    / HCO3: 24    / Base Excess: 0.9   /  SaO2: 98                                      9.3    7.87  )-----------( 162      ( 22 Mar 2020 02:00 )             27.8     03-22    137  |  99  |  22<H>  ----------------------------<  96  3.9   |  28  |  1.0    Ca    8.3<L>      22 Mar 2020 02:00  Mg     2.5     03-22    TPro  6.0  /  Alb  4.0  /  TBili  1.9<H>  /  DBili  x   /  AST  52<H>  /  ALT  41  /  AlkPhos  82  03-20    PT/INR - ( 22 Mar 2020 05:15 )   PT: 17.70 sec;   INR: 1.54 ratio                 RADIOLOGY:  Reviewed and interpreted by me  CXR from 03-22-20 shows [+] mild congestion, [-] pneumothorax, [+] R/L effusion, [-] cardiomegaly,       ECG:  Reviewed and interpreted by me: Afib 109  QTC: 387    Assessment:  CAD SP CABG  Afib with RVR  Acute blood loss anemia    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 Wean off supplemental oxygen as able. Daily CXR.   Cardio: Monitor telemetry/alarms. Continue cardiac meds  GI: Tolerating diet. Continue stool softeners. Continue GI prophylaxis  Renal: monitor urine output, supplement electrolytes as needed  Vasc: Heparin SC/SCDs for DVT prophylaxis  Heme: Monitor H/H.   ID: Off antibiotics. Stable.  Endocrine: Monitor finger stick blood sugar and control hyperglycemia with insulin  Physical Therapy: OOB/ambulate        Discussed with Cardiothoracic Team at AM rounds.

## 2020-03-22 NOTE — PROGRESS NOTE ADULT - ATTENDING COMMENTS
POD 6 s/p CABG  pt can't be d/c'd home today due to tachycardia   give another bolus of dig 0.5mg IV  increase amio to 200 bid  can't titrate lopr as pt's BP is soft (yesterday dose had to be held)  CVL -day 6, will keep it in one more day if can't get an IV  hold diuresis today, renal fx stable  Life vest set up for d/c  pacing wires pulled  reassess for d/c tomorrow POD 6 s/p CABG  pt can't be d/c'd home today due to tachycardia   give another bolus of dig 0.5mg IV  increase amio to 200 bid  can't titrate lopr as pt's BP is soft (yesterday dose had to be held)  CVL -day 6, will keep it in one more day if can't get an IV  hold diuresis today, renal fx stable  Life vest set up for d/c  pacing wires pulled  cont coum 3 tonight, INR 1.5, cont lovenox bid, cont plavix, no asa for now  reassess for d/c tomorrow  case extensively d/w CTU team

## 2020-03-22 NOTE — PROGRESS NOTE ADULT - SUBJECTIVE AND OBJECTIVE BOX
OPERATIVE PROCEDURE(s):                POD #  6 s/p CABG with PO atrial fibrillation                     85yMale  SURGEON(s): IFEANYI Garcia  SUBJECTIVE ASSESSMENT: wants to go home  Vital Signs Last 24 Hrs  T(F): 97.7 (22 Mar 2020 00:00), Max: 97.9 (21 Mar 2020 20:00)  HR: 107 (22 Mar 2020 06:00) (87 - 132)  BP: 108/69 (22 Mar 2020 06:00) (82/52 - 120/72)  BP(mean): 82 (22 Mar 2020 06:00) (62 - 89)  RR: 20 (22 Mar 2020 06:00) (16 - 25)  SpO2: 98% (22 Mar 2020 06:00) (72% - 100%)    I&O's Detail    21 Mar 2020 07:01  -  22 Mar 2020 07:00  --------------------------------------------------------  IN:    IV PiggyBack: 200 mL    Oral Fluid: 1200 mL  Total IN: 1400 mL    OUT:    Voided: 2700 mL  Total OUT: 2700 mL    Net:   I&O's Detail    20 Mar 2020 07:01  -  21 Mar 2020 07:00  --------------------------------------------------------  Total NET: -76.6 mL      21 Mar 2020 07:01  -  22 Mar 2020 07:00  --------------------------------------------------------  Total NET: -1300 mL    CAPILLARY BLOOD GLUCOSE    POCT Blood Glucose.: 112 mg/dL (22 Mar 2020 06:47)  POCT Blood Glucose.: 101 mg/dL (21 Mar 2020 16:11)  POCT Blood Glucose.: 84 mg/dL (21 Mar 2020 11:06)    Physical Exam:  General: NAD; A&Ox3  Cardiac: S1/S2, RRR, no murmur, no rubs  Lungs: unlabored respirations, CTA b/l, no wheeze, no rales, no crackles  Abdomen: Soft/NT/ND; positive bowel sounds x 4  Sternum: Intact, no click, incision healing well with no drainage  Incisions: Incisions clean/dry/intact  Extremities: No edema b/l lower extremities; good capillary refill; no cyanosis; palpable 1+ pedal pulses b/l      BOWEL MOVEMENT:  [] YES         LABS:                        9.3<L>  7.87  )-----------( 162      ( 22 Mar 2020 02:00 )             27.8<L>                        8.5<L>  8.78  )-----------( 134      ( 21 Mar 2020 02:00 )             25.1<L>    03-22    137  |  99  |  22<H>  ----------------------------<  96  3.9   |  28  |  1.0  03-21    139  |  101  |  25<H>  ----------------------------<  84  3.8   |  27  |  0.9    Ca    8.3<L>      22 Mar 2020 02:00  Mg     2.5     03-22    TPro  6.0 [6.0 - 8.0]  /  Alb  4.0 [3.5 - 5.2]  /  TBili  1.9<H> [0.2 - 1.2]  /  DBili  x   /  AST  52<H> [0 - 41]  /  ALT  41 [0 - 41]  /  AlkPhos  82 [30 - 115]  03-20    PT/INR - ( 22 Mar 2020 05:15 )   PT: ;   INR: 1.54 ratio       PT/INR - ( 21 Mar 2020 02:00 )   PT: ;   INR: 1.61 ratio       ABG - ( 20 Mar 2020 16:23 )  pH: 7.46  /  pCO2: 34    /  pO2: 90    / HCO3: 24    / Base Excess: 0.9   /  SaO2: 98    /  LA: 1.5      RADIOLOGY & ADDITIONAL TESTS:  CXR:   EKG:  MEDICATIONS  (STANDING):  aMIOdarone    Tablet 200 milliGRAM(s) Oral daily  atorvastatin 80 milliGRAM(s) Oral at bedtime  chlorhexidine 4% Liquid 1 Application(s) Topical daily  clopidogrel Tablet 75 milliGRAM(s) Oral daily  dextrose 5%. 1000 milliLiter(s) (50 mL/Hr) IV Continuous <Continuous>  dextrose 50% Injectable 50 milliLiter(s) IV Push every 15 minutes  dextrose 50% Injectable 25 milliLiter(s) IV Push every 15 minutes  digoxin     Tablet 0.125 milliGRAM(s) Oral daily  enoxaparin Injectable 40 milliGRAM(s) SubCutaneous two times a day  famotidine    Tablet 20 milliGRAM(s) Oral two times a day  guaiFENesin  milliGRAM(s) Oral every 12 hours  insulin lispro (HumaLOG) corrective regimen sliding scale   SubCutaneous three times a day before meals  ipratropium    for Nebulization 500 MICROGram(s) Nebulizer every 6 hours  magnesium sulfate  IVPB 1 Gram(s) IV Intermittent every 12 hours  metoprolol tartrate 12.5 milliGRAM(s) Oral every 8 hours  polyethylene glycol 3350 17 Gram(s) Oral daily  senna 2 Tablet(s) Oral at bedtime  sodium chloride 0.9%. 1000 milliLiter(s) (20 mL/Hr) IV Continuous <Continuous>    MEDICATIONS  (PRN):  acetaminophen   Tablet .. 325 milliGRAM(s) Oral every 4 hours PRN Mild Pain (1 - 3)  dextrose 40% Gel 15 Gram(s) Oral once PRN Blood Glucose LESS THAN 70 milliGRAM(s)/deciliter  glucagon  Injectable 1 milliGRAM(s) IntraMuscular once PRN Glucose LESS THAN 70 milligrams/deciliter    Allergies    No Known Allergies    Intolerances      Ambulation/Activity Status:    Assessment/Plan:  85y Male status-post CABG4              POD # 6  - Case and plan discussed with CTU Intensivist and CT Surgeon -   - Continue CTU supportive care and ongoing plan of care as per continuing CTU rounds.    - Continue GI prophylaxis  - stop SC heparin, start Lovenox 40 mg BID till PT therapeutic   - Incentive Spirometry 10 times an hour  - Continue to advance physical activity as tolerated and continue PT/OT as directed  1. CAD: stop ASA 81, continue with Plavix 75 mg,, statin and increase BB .  2. A. Fib: continue PO amiodarone, start Digoxin 0.125 daily, give coumadin 2 mg tonight  - repeat EF 26% on TTE, awaiting life vest application ([employer] notified)  - RX for rolling walker written and walker at bedside  - pulmonary congestion will give 40mg of Lasix with 20 MEq of K  - needs appointment for coumadin clinic prior to discharge  - will stop basal and prandial insulin, will keep sliding scale, pre-op a1C was 6.1 and glucose has been controlled    Social Service Disposition:   Home early next week OPERATIVE PROCEDURE(s):                POD #  6 s/p CABG with PO atrial fibrillation                     85yMale  SURGEON(s): IFEANYI Garcia  SUBJECTIVE ASSESSMENT: wants to go home  Vital Signs Last 24 Hrs  T(F): 97.7 (22 Mar 2020 00:00), Max: 97.9 (21 Mar 2020 20:00)  HR: 107 (22 Mar 2020 06:00) (87 - 132)  BP: 108/69 (22 Mar 2020 06:00) (82/52 - 120/72)  BP(mean): 82 (22 Mar 2020 06:00) (62 - 89)  RR: 20 (22 Mar 2020 06:00) (16 - 25)  SpO2: 98% (22 Mar 2020 06:00) (72% - 100%)    I&O's Detail    21 Mar 2020 07:01  -  22 Mar 2020 07:00  --------------------------------------------------------  IN:    IV PiggyBack: 200 mL    Oral Fluid: 1200 mL  Total IN: 1400 mL    OUT:    Voided: 2700 mL  Total OUT: 2700 mL    Net:   I&O's Detail    20 Mar 2020 07:01  -  21 Mar 2020 07:00  --------------------------------------------------------  Total NET: -76.6 mL      21 Mar 2020 07:01  -  22 Mar 2020 07:00  --------------------------------------------------------  Total NET: -1300 mL    CAPILLARY BLOOD GLUCOSE    POCT Blood Glucose.: 112 mg/dL (22 Mar 2020 06:47)  POCT Blood Glucose.: 101 mg/dL (21 Mar 2020 16:11)  POCT Blood Glucose.: 84 mg/dL (21 Mar 2020 11:06)    Physical Exam:  General: NAD; A&Ox3  Cardiac: S1/S2, RRR, no murmur, no rubs  Lungs: unlabored respirations, CTA b/l, no wheeze, no rales, no crackles  Abdomen: Soft/NT/ND; positive bowel sounds x 4  Sternum: Intact, no click, incision healing well with no drainage  Incisions: Incisions clean/dry/intact  Extremities: No edema b/l lower extremities; good capillary refill; no cyanosis; palpable 1+ pedal pulses b/l      BOWEL MOVEMENT:  [] YES         LABS:                        9.3<L>  7.87  )-----------( 162      ( 22 Mar 2020 02:00 )             27.8<L>                        8.5<L>  8.78  )-----------( 134      ( 21 Mar 2020 02:00 )             25.1<L>    03-22    137  |  99  |  22<H>  ----------------------------<  96  3.9   |  28  |  1.0  03-21    139  |  101  |  25<H>  ----------------------------<  84  3.8   |  27  |  0.9    Ca    8.3<L>      22 Mar 2020 02:00  Mg     2.5     03-22    TPro  6.0 [6.0 - 8.0]  /  Alb  4.0 [3.5 - 5.2]  /  TBili  1.9<H> [0.2 - 1.2]  /  DBili  x   /  AST  52<H> [0 - 41]  /  ALT  41 [0 - 41]  /  AlkPhos  82 [30 - 115]  03-20    PT/INR - ( 22 Mar 2020 05:15 )   PT: ;   INR: 1.54 ratio       PT/INR - ( 21 Mar 2020 02:00 )   PT: ;   INR: 1.61 ratio       ABG - ( 20 Mar 2020 16:23 )  pH: 7.46  /  pCO2: 34    /  pO2: 90    / HCO3: 24    / Base Excess: 0.9   /  SaO2: 98    /  LA: 1.5      MEDICATIONS  (STANDING):  aMIOdarone    Tablet 200 milliGRAM(s) Oral daily  atorvastatin 80 milliGRAM(s) Oral at bedtime  chlorhexidine 4% Liquid 1 Application(s) Topical daily  clopidogrel Tablet 75 milliGRAM(s) Oral daily  digoxin     Tablet 0.125 milliGRAM(s) Oral daily  enoxaparin Injectable 40 milliGRAM(s) SubCutaneous two times a day  famotidine    Tablet 20 milliGRAM(s) Oral two times a day  guaiFENesin  milliGRAM(s) Oral every 12 hours  insulin lispro (HumaLOG) corrective regimen sliding scale   SubCutaneous three times a day before meals  ipratropium    for Nebulization 500 MICROGram(s) Nebulizer every 6 hours  magnesium sulfate  IVPB 1 Gram(s) IV Intermittent every 12 hours  metoprolol tartrate 12.5 milliGRAM(s) Oral every 8 hours  polyethylene glycol 3350 17 Gram(s) Oral daily  senna 2 Tablet(s) Oral at bedtime    MEDICATIONS  (PRN):  acetaminophen   Tablet .. 325 milliGRAM(s) Oral every 4 hours PRN Mild Pain (1 - 3)  dextrose 40% Gel 15 Gram(s) Oral once PRN Blood Glucose LESS THAN 70 milliGRAM(s)/deciliter  glucagon  Injectable 1 milliGRAM(s) IntraMuscular once PRN Glucose LESS THAN 70 milligrams/deciliter    Allergies    No Known Allergies    Intolerances      Ambulation/Activity Status:    Assessment/Plan:  85y Male status-post CABG4              POD # 6  - Case and plan discussed with CTU Intensivist and CT Surgeon -   - Continue CTU supportive care and ongoing plan of care as per continuing CTU rounds.    - Continue GI prophylaxis  - stop SC heparin, start Lovenox 40 mg BID till PT therapeutic   - Incentive Spirometry 10 times an hour  - Continue to advance physical activity as tolerated and continue PT/OT as directed  1. CAD: stop ASA 81, continue with Plavix 75 mg,, statin and increase BB .  2. A. Fib: continue PO amiodarone, start Digoxin 0.125 daily, give coumadin 2 mg tonight  - repeat EF 26% on TTE, awaiting life vest application ([employer] notified)  - RX for rolling walker written and walker at bedside  - pulmonary congestion will give 40mg of Lasix with 20 MEq of K  - needs appointment for coumadin clinic prior to discharge  - will stop basal and prandial insulin, will keep sliding scale, pre-op a1C was 6.1 and glucose has been controlled    Social Service Disposition:   Home early next week OPERATIVE PROCEDURE(s):                POD #  6 s/p CABG with PO atrial fibrillation                     85yMale  SURGEON(s): IFEANYI Garcia  SUBJECTIVE ASSESSMENT: wants to go home but HR uncontrolled  Vital Signs Last 24 Hrs  T(F): 97.7 (22 Mar 2020 00:00), Max: 97.9 (21 Mar 2020 20:00)  HR: 107 (22 Mar 2020 06:00) (87 - 132)  BP: 108/69 (22 Mar 2020 06:00) (82/52 - 120/72)  BP(mean): 82 (22 Mar 2020 06:00) (62 - 89)  RR: 20 (22 Mar 2020 06:00) (16 - 25)  SpO2: 98% (22 Mar 2020 06:00) (72% - 100%)    I&O's Detail    21 Mar 2020 07:01  -  22 Mar 2020 07:00  --------------------------------------------------------  IN:    IV PiggyBack: 200 mL    Oral Fluid: 1200 mL  Total IN: 1400 mL    OUT:    Voided: 2700 mL  Total OUT: 2700 mL    Net:   I&O's Detail    20 Mar 2020 07:01  -  21 Mar 2020 07:00  --------------------------------------------------------  Total NET: -76.6 mL      21 Mar 2020 07:01  -  22 Mar 2020 07:00  --------------------------------------------------------  Total NET: -1300 mL    CAPILLARY BLOOD GLUCOSE    POCT Blood Glucose.: 112 mg/dL (22 Mar 2020 06:47)  POCT Blood Glucose.: 101 mg/dL (21 Mar 2020 16:11)  POCT Blood Glucose.: 84 mg/dL (21 Mar 2020 11:06)    Physical Exam:  General: NAD; A&Ox3  Cardiac: S1/S2, RRR, no murmur, no rubs  Lungs: unlabored respirations, CTA b/l, no wheeze, no rales, no crackles  Abdomen: Soft/NT/ND; positive bowel sounds x 4  Sternum: Intact, no click, incision healing well with no drainage  Incisions: Incisions clean/dry/intact  Extremities: No edema b/l lower extremities; good capillary refill; no cyanosis; palpable 1+ pedal pulses b/l      BOWEL MOVEMENT:  [] YES         < from: Xray Chest 1 View- PORTABLE-Routine (03.22.20 @ 05:37) >  Impression:      Improving bibasilar opacifications and effusions.    < end of copied text >    LABS:                        9.3<L>  7.87  )-----------( 162      ( 22 Mar 2020 02:00 )             27.8<L>                        8.5<L>  8.78  )-----------( 134      ( 21 Mar 2020 02:00 )             25.1<L>    03-22    137  |  99  |  22<H>  ----------------------------<  96  3.9   |  28  |  1.0  03-21    139  |  101  |  25<H>  ----------------------------<  84  3.8   |  27  |  0.9    Ca    8.3<L>      22 Mar 2020 02:00  Mg     2.5     03-22    TPro  6.0 [6.0 - 8.0]  /  Alb  4.0 [3.5 - 5.2]  /  TBili  1.9<H> [0.2 - 1.2]  /  DBili  x   /  AST  52<H> [0 - 41]  /  ALT  41 [0 - 41]  /  AlkPhos  82 [30 - 115]  03-20    PT/INR - ( 22 Mar 2020 05:15 )   PT: ;   INR: 1.54 ratio       PT/INR - ( 21 Mar 2020 02:00 )   PT: ;   INR: 1.61 ratio       ABG - ( 20 Mar 2020 16:23 )  pH: 7.46  /  pCO2: 34    /  pO2: 90    / HCO3: 24    / Base Excess: 0.9   /  SaO2: 98    /  LA: 1.5      MEDICATIONS  (STANDING):  aMIOdarone    Tablet 200 milliGRAM(s) Oral daily  atorvastatin 80 milliGRAM(s) Oral at bedtime  chlorhexidine 4% Liquid 1 Application(s) Topical daily  clopidogrel Tablet 75 milliGRAM(s) Oral daily  digoxin     Tablet 0.125 milliGRAM(s) Oral daily  enoxaparin Injectable 40 milliGRAM(s) SubCutaneous two times a day  famotidine    Tablet 20 milliGRAM(s) Oral two times a day  guaiFENesin  milliGRAM(s) Oral every 12 hours  insulin lispro (HumaLOG) corrective regimen sliding scale   SubCutaneous three times a day before meals  ipratropium    for Nebulization 500 MICROGram(s) Nebulizer every 6 hours  magnesium sulfate  IVPB 1 Gram(s) IV Intermittent every 12 hours  metoprolol tartrate 12.5 milliGRAM(s) Oral every 8 hours  polyethylene glycol 3350 17 Gram(s) Oral daily  senna 2 Tablet(s) Oral at bedtime    MEDICATIONS  (PRN):  acetaminophen   Tablet .. 325 milliGRAM(s) Oral every 4 hours PRN Mild Pain (1 - 3)  dextrose 40% Gel 15 Gram(s) Oral once PRN Blood Glucose LESS THAN 70 milliGRAM(s)/deciliter  glucagon  Injectable 1 milliGRAM(s) IntraMuscular once PRN Glucose LESS THAN 70 milligrams/deciliter    Allergies    No Known Allergies    Intolerances      Ambulation/Activity Status:    Assessment/Plan:  85y Male status-post CABG4              POD # 6  - Case and plan discussed with CTU Intensivist and CT Surgeon -   - Continue CTU supportive care and ongoing plan of care as per continuing CTU rounds.    - Continue GI prophylaxis  - Lovenox 40 mg BID till PT therapeutic   - Incentive Spirometry 10 times an hour  - Continue to advance physical activity as tolerated and continue PT/OT as directed  1. CAD: stop ASA 81 till platelets > 100K, continue with Plavix 75 mg,, statin and increase BB .  2. A. Fib: change PO amiodarone to BID, start Digoxin 0.125 daily, give coumadin 3 mg tonight, on bolus of digoxin .5 given  - repeat EF 26% on TTE, life vest in place  - RX for rolling walker written and walker at bedside  - pulmonary congestion improved on CXr will discuss with attending need for daily lasix  - needs appointment for coumadin clinic prior to discharge  - sugars well controlled  - will need appointment with coumadin clinic prior to discharge  - discuss with social work if safe discharge home

## 2020-03-23 LAB
DIGOXIN SERPL-MCNC: 1.6 NG/ML — SIGNIFICANT CHANGE UP (ref 0.8–2)
GLUCOSE BLDC GLUCOMTR-MCNC: 107 MG/DL — HIGH (ref 70–99)
GLUCOSE BLDC GLUCOMTR-MCNC: 109 MG/DL — HIGH (ref 70–99)
GLUCOSE BLDC GLUCOMTR-MCNC: 124 MG/DL — HIGH (ref 70–99)
GLUCOSE BLDC GLUCOMTR-MCNC: 129 MG/DL — HIGH (ref 70–99)
HCT VFR BLD CALC: 31.2 % — LOW (ref 42–52)
HGB BLD-MCNC: 10.4 G/DL — LOW (ref 14–18)
INR BLD: 1.29 RATIO — SIGNIFICANT CHANGE UP (ref 0.65–1.3)
MAGNESIUM SERPL-MCNC: 2.5 MG/DL — HIGH (ref 1.8–2.4)
MCHC RBC-ENTMCNC: 31.6 PG — HIGH (ref 27–31)
MCHC RBC-ENTMCNC: 33.3 G/DL — SIGNIFICANT CHANGE UP (ref 32–37)
MCV RBC AUTO: 94.8 FL — HIGH (ref 80–94)
NRBC # BLD: 0 /100 WBCS — SIGNIFICANT CHANGE UP (ref 0–0)
PLATELET # BLD AUTO: 206 K/UL — SIGNIFICANT CHANGE UP (ref 130–400)
PROTHROM AB SERPL-ACNC: 14.8 SEC — HIGH (ref 9.95–12.87)
RBC # BLD: 3.29 M/UL — LOW (ref 4.7–6.1)
RBC # FLD: 15.6 % — HIGH (ref 11.5–14.5)
WBC # BLD: 10.33 K/UL — SIGNIFICANT CHANGE UP (ref 4.8–10.8)
WBC # FLD AUTO: 10.33 K/UL — SIGNIFICANT CHANGE UP (ref 4.8–10.8)

## 2020-03-23 PROCEDURE — 99233 SBSQ HOSP IP/OBS HIGH 50: CPT | Mod: 25

## 2020-03-23 PROCEDURE — 99232 SBSQ HOSP IP/OBS MODERATE 35: CPT

## 2020-03-23 PROCEDURE — 93010 ELECTROCARDIOGRAM REPORT: CPT

## 2020-03-23 PROCEDURE — 71045 X-RAY EXAM CHEST 1 VIEW: CPT | Mod: 26

## 2020-03-23 PROCEDURE — 92960 CARDIOVERSION ELECTRIC EXT: CPT

## 2020-03-23 RX ORDER — DILTIAZEM HCL 120 MG
30 CAPSULE, EXT RELEASE 24 HR ORAL ONCE
Refills: 0 | Status: COMPLETED | OUTPATIENT
Start: 2020-03-23 | End: 2020-03-23

## 2020-03-23 RX ORDER — ASPIRIN/CALCIUM CARB/MAGNESIUM 324 MG
81 TABLET ORAL DAILY
Refills: 0 | Status: DISCONTINUED | OUTPATIENT
Start: 2020-03-23 | End: 2020-03-24

## 2020-03-23 RX ORDER — ASPIRIN/CALCIUM CARB/MAGNESIUM 324 MG
81 TABLET ORAL DAILY
Refills: 0 | Status: DISCONTINUED | OUTPATIENT
Start: 2020-03-23 | End: 2020-03-23

## 2020-03-23 RX ORDER — FUROSEMIDE 40 MG
20 TABLET ORAL ONCE
Refills: 0 | Status: COMPLETED | OUTPATIENT
Start: 2020-03-23 | End: 2020-03-23

## 2020-03-23 RX ORDER — METOPROLOL TARTRATE 50 MG
2.5 TABLET ORAL ONCE
Refills: 0 | Status: COMPLETED | OUTPATIENT
Start: 2020-03-23 | End: 2020-03-23

## 2020-03-23 RX ORDER — APIXABAN 2.5 MG/1
2.5 TABLET, FILM COATED ORAL
Refills: 0 | Status: DISCONTINUED | OUTPATIENT
Start: 2020-03-23 | End: 2020-03-24

## 2020-03-23 RX ORDER — WARFARIN SODIUM 2.5 MG/1
3 TABLET ORAL ONCE
Refills: 0 | Status: DISCONTINUED | OUTPATIENT
Start: 2020-03-23 | End: 2020-03-23

## 2020-03-23 RX ORDER — PROPOFOL 10 MG/ML
15 INJECTION, EMULSION INTRAVENOUS ONCE
Refills: 0 | Status: COMPLETED | OUTPATIENT
Start: 2020-03-23 | End: 2020-03-23

## 2020-03-23 RX ORDER — METOPROLOL TARTRATE 50 MG
5 TABLET ORAL ONCE
Refills: 0 | Status: DISCONTINUED | OUTPATIENT
Start: 2020-03-23 | End: 2020-03-23

## 2020-03-23 RX ORDER — MAGNESIUM SULFATE 500 MG/ML
1 VIAL (ML) INJECTION ONCE
Refills: 0 | Status: COMPLETED | OUTPATIENT
Start: 2020-03-23 | End: 2020-03-23

## 2020-03-23 RX ORDER — PROCAINAMIDE HCL 500 MG
1000 TABLET, EXTENDED RELEASE ORAL ONCE
Refills: 0 | Status: COMPLETED | OUTPATIENT
Start: 2020-03-23 | End: 2020-03-23

## 2020-03-23 RX ORDER — FENTANYL CITRATE 50 UG/ML
50 INJECTION INTRAVENOUS ONCE
Refills: 0 | Status: DISCONTINUED | OUTPATIENT
Start: 2020-03-23 | End: 2020-03-23

## 2020-03-23 RX ADMIN — Medication 81 MILLIGRAM(S): at 11:22

## 2020-03-23 RX ADMIN — AMIODARONE HYDROCHLORIDE 200 MILLIGRAM(S): 400 TABLET ORAL at 17:55

## 2020-03-23 RX ADMIN — SENNA PLUS 2 TABLET(S): 8.6 TABLET ORAL at 21:05

## 2020-03-23 RX ADMIN — CLOPIDOGREL BISULFATE 75 MILLIGRAM(S): 75 TABLET, FILM COATED ORAL at 11:22

## 2020-03-23 RX ADMIN — Medication 12.5 MILLIGRAM(S): at 05:13

## 2020-03-23 RX ADMIN — Medication 600 MILLIGRAM(S): at 17:55

## 2020-03-23 RX ADMIN — Medication 520 MILLIGRAM(S): at 12:15

## 2020-03-23 RX ADMIN — Medication 0.12 MILLIGRAM(S): at 05:13

## 2020-03-23 RX ADMIN — Medication 20 MILLIGRAM(S): at 10:00

## 2020-03-23 RX ADMIN — Medication 30 MILLIGRAM(S): at 11:21

## 2020-03-23 RX ADMIN — FAMOTIDINE 20 MILLIGRAM(S): 10 INJECTION INTRAVENOUS at 17:55

## 2020-03-23 RX ADMIN — FENTANYL CITRATE 50 MICROGRAM(S): 50 INJECTION INTRAVENOUS at 13:55

## 2020-03-23 RX ADMIN — Medication 100 GRAM(S): at 05:13

## 2020-03-23 RX ADMIN — Medication 10 MILLIEQUIVALENT(S): at 11:26

## 2020-03-23 RX ADMIN — AMIODARONE HYDROCHLORIDE 200 MILLIGRAM(S): 400 TABLET ORAL at 05:13

## 2020-03-23 RX ADMIN — ENOXAPARIN SODIUM 40 MILLIGRAM(S): 100 INJECTION SUBCUTANEOUS at 05:14

## 2020-03-23 RX ADMIN — FENTANYL CITRATE 50 MICROGRAM(S): 50 INJECTION INTRAVENOUS at 13:38

## 2020-03-23 RX ADMIN — ATORVASTATIN CALCIUM 80 MILLIGRAM(S): 80 TABLET, FILM COATED ORAL at 21:05

## 2020-03-23 RX ADMIN — Medication 100 GRAM(S): at 17:55

## 2020-03-23 RX ADMIN — PROPOFOL 15 MILLIGRAM(S): 10 INJECTION, EMULSION INTRAVENOUS at 13:38

## 2020-03-23 RX ADMIN — FAMOTIDINE 20 MILLIGRAM(S): 10 INJECTION INTRAVENOUS at 05:13

## 2020-03-23 RX ADMIN — APIXABAN 2.5 MILLIGRAM(S): 2.5 TABLET, FILM COATED ORAL at 14:09

## 2020-03-23 RX ADMIN — Medication 12.5 MILLIGRAM(S): at 21:05

## 2020-03-23 RX ADMIN — Medication 100 GRAM(S): at 12:15

## 2020-03-23 RX ADMIN — CHLORHEXIDINE GLUCONATE 1 APPLICATION(S): 213 SOLUTION TOPICAL at 11:26

## 2020-03-23 RX ADMIN — Medication 600 MILLIGRAM(S): at 05:13

## 2020-03-23 RX ADMIN — Medication 2.5 MILLIGRAM(S): at 11:45

## 2020-03-23 RX ADMIN — Medication 20 MILLIGRAM(S): at 05:13

## 2020-03-23 NOTE — DIETITIAN INITIAL EVALUATION ADULT. - ENERGY NEEDS
calorie 1558-1714kcal (MSJ x 1-1.1 AF) for borderline obesity, age considered  protein 80-89g (0.9-1.0g/kg CBW) as above  fluid per CTU team   \

## 2020-03-23 NOTE — PROGRESS NOTE ADULT - SUBJECTIVE AND OBJECTIVE BOX
OPERATIVE PROCEDURE(s):                POD #   7 s/p CABG with post op afibe with RVR                    85yMale  SURGEON(s): IFEANYI Garcia  SUBJECTIVE ASSESSMENT: still with rapid heart rate, seen by EP multiple meds given, plan is for cardioversion  Vital Signs Last 24 Hrs  T(F): 98.1 (23 Mar 2020 12:00), Max: 98.1 (23 Mar 2020 12:00)  HR: 124 (23 Mar 2020 12:30) (98 - 135)  BP: 77/51 (23 Mar 2020 12:30) (77/51 - 128/64)  BP(mean): 59 (23 Mar 2020 12:30) (59 - 88)  RR: 24 (23 Mar 2020 12:30) (17 - 27)  SpO2: 95% (23 Mar 2020 12:30) (80% - 100%)    I&O's Detail    22 Mar 2020 07:01  -  23 Mar 2020 07:00  --------------------------------------------------------  IN:    IV PiggyBack: 100 mL    Oral Fluid: 720 mL  Total IN: 820 mL    OUT:    Voided: 1050 mL  Total OUT: 1050 mL        Net:   I&O's Detail    21 Mar 2020 07:01  -  22 Mar 2020 07:00  --------------------------------------------------------  Total NET: -1300 mL      22 Mar 2020 07:01  -  23 Mar 2020 07:00  --------------------------------------------------------  Total NET: -230 mL        CAPILLARY BLOOD GLUCOSE      POCT Blood Glucose.: 124 mg/dL (23 Mar 2020 11:18)  POCT Blood Glucose.: 109 mg/dL (23 Mar 2020 06:40)  POCT Blood Glucose.: 96 mg/dL (22 Mar 2020 16:14)    Physical Exam:  General: NAD; A&Ox3  Cardiac: S1/S2, RRR, no murmur, no rubs  Lungs: unlabored respirations,bs decreased at bases  Abdomen: Soft/NT/ND;   Sternum: Intact, no click, incision healing well with no drainage  Incisions: Incisions clean/dry/intact  Extremities: No edema b/l lower extremities        LABS:                        10.4<L>  10.33 )-----------( 206      ( 23 Mar 2020 05:20 )             31.2<L>                        9.3<L>  7.87  )-----------( 162      ( 22 Mar 2020 02:00 )             27.8<L>    03-22    137  |  99  |  22<H>  ----------------------------<  96  3.9   |  28  |  1.0  03-21    139  |  101  |  25<H>  ----------------------------<  84  3.8   |  27  |  0.9    Ca    8.3<L>      22 Mar 2020 02:00  Mg     2.5     03-23    TPro  6.0 [6.0 - 8.0]  /  Alb  4.0 [3.5 - 5.2]  /  TBili  1.9<H> [0.2 - 1.2]  /  DBili  x   /  AST  52<H> [0 - 41]  /  ALT  41 [0 - 41]  /  AlkPhos  82 [30 - 115]  03-20    PT/INR - ( 23 Mar 2020 05:20 )   PT: ;   INR: 1.29 ratio       PT/INR - ( 22 Mar 2020 05:15 )   PT: ;   INR: 1.54 ratio      RADIOLOGY & ADDITIONAL TESTS:  CXR: < from: Xray Chest 1 View- PORTABLE-Routine (03.23.20 @ 04:55) >  Impression:    Unchanged bibasilar opacifications and effusions, left greater than right.     EKG: < from: 12 Lead ECG (03.23.20 @ 07:41) >  Ventricular Rate 99 BPM    Atrial Rate 122 BPM    QRS Duration 84 ms    Q-T Interval 298 ms    QTC Calculation(Bezet) 382 ms    R Axis 9 degrees    T Axis 120 degrees    Diagnosis Line Atrial fibrillation with premature ventricular or aberrantly conducted  complexes  Low voltage QRS  Anterior infarct , age undetermined  Possible Inferior infarct , age undetermined  ST & T wave abnormality, consider lateral ischemia  Baseline artifact  Abnormal ECG    MEDICATIONS  (STANDING):  aMIOdarone    Tablet 200 milliGRAM(s) Oral two times a day  apixaban 2.5 milliGRAM(s) Oral two times a day  aspirin  chewable 81 milliGRAM(s) Oral daily  atorvastatin 80 milliGRAM(s) Oral at bedtime  chlorhexidine 4% Liquid 1 Application(s) Topical daily  clopidogrel Tablet 75 milliGRAM(s) Oral daily  dextrose 5%. 1000 milliLiter(s) (50 mL/Hr) IV Continuous <Continuous>  dextrose 50% Injectable 50 milliLiter(s) IV Push every 15 minutes  dextrose 50% Injectable 25 milliLiter(s) IV Push every 15 minutes  digoxin     Tablet 0.125 milliGRAM(s) Oral daily  famotidine    Tablet 20 milliGRAM(s) Oral two times a day  furosemide    Tablet 20 milliGRAM(s) Oral daily  guaiFENesin  milliGRAM(s) Oral every 12 hours  insulin lispro (HumaLOG) corrective regimen sliding scale   SubCutaneous three times a day before meals  ipratropium    for Nebulization 500 MICROGram(s) Nebulizer every 6 hours  magnesium sulfate  IVPB 1 Gram(s) IV Intermittent every 12 hours  metoprolol tartrate 12.5 milliGRAM(s) Oral every 8 hours  metoprolol tartrate Injectable 5 milliGRAM(s) IV Push once  polyethylene glycol 3350 17 Gram(s) Oral daily  potassium chloride    Tablet ER 10 milliEquivalent(s) Oral daily  senna 2 Tablet(s) Oral at bedtime  sodium chloride 0.9%. 1000 milliLiter(s) (20 mL/Hr) IV Continuous <Continuous>    MEDICATIONS  (PRN):  acetaminophen   Tablet .. 325 milliGRAM(s) Oral every 4 hours PRN Mild Pain (1 - 3)  dextrose 40% Gel 15 Gram(s) Oral once PRN Blood Glucose LESS THAN 70 milliGRAM(s)/deciliter  glucagon  Injectable 1 milliGRAM(s) IntraMuscular once PRN Glucose LESS THAN 70 milligrams/deciliter    Allergies    No Known Allergies    Intolerances      Ambulation/Activity Status:    Assessment/Plan:  85y Male status-post CABG4              POD # 7   - Case and plan discussed with CTU Intensivist and CT Surgeon - / florencia / Harleen  - Continue CTU supportive care and ongoing plan of care as per continuing CTU rounds.    - Continue GI prophylaxis  - Lovenox 40 mg BID to be discontinued today  - Incentive Spirometry 10 times an hour  - Continue to advance physical activity as tolerated and continue PT/OT as directed  1. CAD: start ASA 81, continue platelets, start elequis 2.5 mg BID and stop Coumadin  2. A. Fib: PO Amiodarone continue digoxin, BB with parameters   - life Vest malfunctioned, rep came in and replaced device  - RX for rolling walker written and walker at bedside  - pulmonary congestion improved on CXr will start daily lasix with K supplement  - sugars well controlled  - discuss with social work if safe for discharge home  - will cardiovert this afternoon    Social Service Disposition:

## 2020-03-23 NOTE — PROGRESS NOTE ADULT - ASSESSMENT
This is a 85y Male with h/o pancreatic and liver cancer in remission since 40 years ago, recent paroxysmal AF, STEMI, s/p RCA stent <30days (2/2020) who presents with TvCAD, now S/p off pump CABG x 4 on 3/16. Post op with AF with RVR, received Amio bolus x2 f/b drip, Procainamide, unsuccessful DCCV x2.     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 Connors in 3 months to repeat echo and re-assess need for ICD if EF does not improve  - Can change Coumadin to Eliquis  - Cont amiodarone  - Cont digoxin and metoprolol    Discussed with EP attending This is a 85y Male with h/o pancreatic and liver cancer in remission since 40 years ago, recent paroxysmal AF, STEMI, s/p RCA stent <30days (2/2020) who presents with TvCAD, now S/p off pump CABG x 4 on 3/16. Post op with AF with RVR, received Amio bolus x2 f/b drip, Procainamide, unsuccessful DCCV x2.     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 Connors in 3 months to repeat echo and re-assess need for ICD if EF does not improve    Discussed with EP attending

## 2020-03-23 NOTE — PROGRESS NOTE ADULT - SUBJECTIVE AND OBJECTIVE BOX
Asked to evaluate this patient for 4A  Currently medically unstable Tachy at 120 with syst BP in the 80s    In addition he has been followed by PT and ambulated last on 3/21 > 200' with supervision    He is not a candidate for 4A rehab  Should be DC to home once stable

## 2020-03-23 NOTE — DIETITIAN INITIAL EVALUATION ADULT. - RD TO REMAIN AVAILABLE
RD to monitor energy intake, body composition, NFPF, glucose profile. Goal: in 7 days pt. to continue to consume at least 75% PO at meals/yes

## 2020-03-23 NOTE — PROGRESS NOTE ADULT - SUBJECTIVE AND OBJECTIVE BOX
INTERVAL HPI/OVERNIGHT EVENTS:  Patient denies fever, chills, dizziness, syncope, chest pain, palpitations, SOB, cough, abd pain.    MEDICATIONS  (STANDING):  aMIOdarone    Tablet 200 milliGRAM(s) Oral two times a day  aspirin  chewable 81 milliGRAM(s) Oral daily  atorvastatin 80 milliGRAM(s) Oral at bedtime  chlorhexidine 4% Liquid 1 Application(s) Topical daily  clopidogrel Tablet 75 milliGRAM(s) Oral daily  dextrose 5%. 1000 milliLiter(s) (50 mL/Hr) IV Continuous <Continuous>  dextrose 50% Injectable 50 milliLiter(s) IV Push every 15 minutes  dextrose 50% Injectable 25 milliLiter(s) IV Push every 15 minutes  digoxin     Tablet 0.125 milliGRAM(s) Oral daily  diltiazem    Tablet 30 milliGRAM(s) Oral once  famotidine    Tablet 20 milliGRAM(s) Oral two times a day  furosemide    Tablet 20 milliGRAM(s) Oral daily  furosemide   Injectable 20 milliGRAM(s) IV Push once  guaiFENesin  milliGRAM(s) Oral every 12 hours  insulin lispro (HumaLOG) corrective regimen sliding scale   SubCutaneous three times a day before meals  ipratropium    for Nebulization 500 MICROGram(s) Nebulizer every 6 hours  magnesium sulfate  IVPB 1 Gram(s) IV Intermittent every 12 hours  metoprolol tartrate 12.5 milliGRAM(s) Oral every 8 hours  polyethylene glycol 3350 17 Gram(s) Oral daily  potassium chloride    Tablet ER 10 milliEquivalent(s) Oral daily  senna 2 Tablet(s) Oral at bedtime  sodium chloride 0.9%. 1000 milliLiter(s) (20 mL/Hr) IV Continuous <Continuous>  warfarin 3 milliGRAM(s) Oral once    MEDICATIONS  (PRN):  acetaminophen   Tablet .. 325 milliGRAM(s) Oral every 4 hours PRN Mild Pain (1 - 3)  dextrose 40% Gel 15 Gram(s) Oral once PRN Blood Glucose LESS THAN 70 milliGRAM(s)/deciliter  glucagon  Injectable 1 milliGRAM(s) IntraMuscular once PRN Glucose LESS THAN 70 milligrams/deciliter      Allergies    No Known Allergies    Intolerances        REVIEW OF SYSTEMS    [ ] A ten-point review of systems was otherwise negative except as noted.        Vital Signs Last 24 Hrs  T(C): 36.6 (23 Mar 2020 08:00), Max: 36.6 (23 Mar 2020 08:00)  T(F): 97.9 (23 Mar 2020 08:00), Max: 97.9 (23 Mar 2020 08:00)  HR: 113 (23 Mar 2020 10:00) (98 - 132)  BP: 99/61 (23 Mar 2020 10:00) (87/52 - 128/64)  BP(mean): 75 (23 Mar 2020 10:00) (63 - 88)  RR: 20 (23 Mar 2020 10:00) (17 - 27)  SpO2: 99% (23 Mar 2020 10:00) (80% - 100%)    03-22-20 @ 07:01  -  03-23-20 @ 07:00  --------------------------------------------------------  IN: 820 mL / OUT: 1050 mL / NET: -230 mL    03-23-20 @ 07:01  -  03-23-20 @ 11:09  --------------------------------------------------------  IN: 240 mL / OUT: 600 mL / NET: -360 mL        Physical Exam  GENERAL: In no apparent distress, well nourished, and hydrated.  HEAD:  Atraumatic, Normocephalic  EYES: EOMI, PERRLA, conjunctiva and sclera clear  ENMT: No tonsillar erythema, exudates, or enlargements; ist mucous membranes, Good dentition, No lesions  NECK: Supple and normal thyroid.  No JVD or carotid bruit.  Carotid pulse is 2+ bilaterally.  HEART: Regular rate and rhythm; No murmurs, rubs, or gallops.  PULMONARY: Clear to auscultation and perfusion.  No rales, wheezing, or rhonchi bilaterally.  ABDOMEN: Soft, Nontender, Nondistended; Bowel sounds present  EXTREMITIES:  2+ Peripheral Pulses, No clubbing, cyanosis, or edema  LYMPH: No lymphadenopathy noted  NEUROLOGICAL: Grossly nonfocal    LABS:                        10.4   10.33 )-----------( 206      ( 23 Mar 2020 05:20 )             31.2     03-22    137  |  99  |  22<H>  ----------------------------<  96  3.9   |  28  |  1.0    Ca    8.3<L>      22 Mar 2020 02:00  Mg     2.5     03-23      PT/INR - ( 23 Mar 2020 05:20 )   PT: 14.80 sec;   INR: 1.29 ratio               RADIOLOGY & ADDITIONAL TESTS: INTERVAL HPI/OVERNIGHT EVENTS:  Currently in SR, getting HD. Patietn without complaints.     MEDICATIONS  (STANDING):  aMIOdarone    Tablet 200 milliGRAM(s) Oral two times a day  aspirin  chewable 81 milliGRAM(s) Oral daily  atorvastatin 80 milliGRAM(s) Oral at bedtime  chlorhexidine 4% Liquid 1 Application(s) Topical daily  clopidogrel Tablet 75 milliGRAM(s) Oral daily  dextrose 5%. 1000 milliLiter(s) (50 mL/Hr) IV Continuous <Continuous>  dextrose 50% Injectable 50 milliLiter(s) IV Push every 15 minutes  dextrose 50% Injectable 25 milliLiter(s) IV Push every 15 minutes  digoxin     Tablet 0.125 milliGRAM(s) Oral daily  diltiazem    Tablet 30 milliGRAM(s) Oral once  famotidine    Tablet 20 milliGRAM(s) Oral two times a day  furosemide    Tablet 20 milliGRAM(s) Oral daily  furosemide   Injectable 20 milliGRAM(s) IV Push once  guaiFENesin  milliGRAM(s) Oral every 12 hours  insulin lispro (HumaLOG) corrective regimen sliding scale   SubCutaneous three times a day before meals  ipratropium    for Nebulization 500 MICROGram(s) Nebulizer every 6 hours  magnesium sulfate  IVPB 1 Gram(s) IV Intermittent every 12 hours  metoprolol tartrate 12.5 milliGRAM(s) Oral every 8 hours  polyethylene glycol 3350 17 Gram(s) Oral daily  potassium chloride    Tablet ER 10 milliEquivalent(s) Oral daily  senna 2 Tablet(s) Oral at bedtime  sodium chloride 0.9%. 1000 milliLiter(s) (20 mL/Hr) IV Continuous <Continuous>  warfarin 3 milliGRAM(s) Oral once    MEDICATIONS  (PRN):  acetaminophen   Tablet .. 325 milliGRAM(s) Oral every 4 hours PRN Mild Pain (1 - 3)  dextrose 40% Gel 15 Gram(s) Oral once PRN Blood Glucose LESS THAN 70 milliGRAM(s)/deciliter  glucagon  Injectable 1 milliGRAM(s) IntraMuscular once PRN Glucose LESS THAN 70 milligrams/deciliter      Allergies  No Known Allergies    REVIEW OF SYSTEMS  A ten-point review of systems was otherwise negative except as noted.    Vital Signs Last 24 Hrs  T(C): 36.6 (23 Mar 2020 08:00), Max: 36.6 (23 Mar 2020 08:00)  T(F): 97.9 (23 Mar 2020 08:00), Max: 97.9 (23 Mar 2020 08:00)  HR: 113 (23 Mar 2020 10:00) (98 - 132)  BP: 99/61 (23 Mar 2020 10:00) (87/52 - 128/64)  BP(mean): 75 (23 Mar 2020 10:00) (63 - 88)  RR: 20 (23 Mar 2020 10:00) (17 - 27)  SpO2: 99% (23 Mar 2020 10:00) (80% - 100%)    03-22-20 @ 07:01  -  03-23-20 @ 07:00  --------------------------------------------------------  IN: 820 mL / OUT: 1050 mL / NET: -230 mL    03-23-20 @ 07:01  -  03-23-20 @ 11:09  --------------------------------------------------------  IN: 240 mL / OUT: 600 mL / NET: -360 mL      Physical Exam  GENERAL: In no apparent distress, well nourished, and hydrated.  HEART: Regular rate and rhythm; No murmurs, rubs, or gallops.  PULMONARY: Clear to auscultation and perfusion.  No rales, wheezing, or rhonchi bilaterally.  CHEST: Surgical incision with steri-strips, c/d/i.   ABDOMEN: Soft, Nontender, Nondistended; Bowel sounds present  EXTREMITIES:  2+ Peripheral Pulses, No clubbing, cyanosis, or edema  NEUROLOGICAL: Grossly nonfocal    LABS:                        10.4   10.33 )-----------( 206      ( 23 Mar 2020 05:20 )             31.2     03-22    137  |  99  |  22<H>  ----------------------------<  96  3.9   |  28  |  1.0    Ca    8.3<L>      22 Mar 2020 02:00  Mg     2.5     03-23      PT/INR - ( 23 Mar 2020 05:20 )   PT: 14.80 sec;   INR: 1.29 ratio      RADIOLOGY & ADDITIONAL TESTS:  < from: Transthoracic Echocardiogram (03.20.20 @ 10:28) >  Summary:   1. Severely decreased segmental left ventricular systolic function.   2. Multiple left ventricular regional wall motion abnormalities exist. See wall motion findings.   3. LV Ejection Fraction by House's Method with a biplane EF of 26 %.   4. Normal left ventricular internal cavity size.   5. The left ventricular diastolic function could not be assessedin this study.   6. Mild aortic regurgitation.   7. Sclerotic aortic valve with normal opening.   8. LA volume Index is 34.0 ml/m² ml/m2.   9. There is mild aortic root calcification.    PHYSICIAN INTERPRETATION:  Left Ventricle: The left ventricularinternal cavity size is normal. Left ventricular wall thickness is normal. Severely decreased segmental left ventricular systolic function. The left ventricular diastolic function could not be assessed in this study.       LV Wall Scoring:  There is diffuse hypokinesis.    Right Ventricle: Normal right ventricular size and function.  Left Atrium: Mildly enlarged left atrium. LA volume Index is 34.0 ml/m² ml/m2.  Right Atrium: Normal right atrial size.  Pericardium: There is no evidence of pericardial effusion.  Mitral Valve: Structurally normal mitral valve, with normal leaflet excursion. The mitral valve is normal in structure. Mild mitral valve regurgitation is seen.  Tricuspid Valve: Structurally normal tricuspid valve, with normal leafletexcursion. The tricuspid valve is normal in structure. Trivial tricuspid regurgitation is visualized.  Aortic Valve: The aortic valve is trileaflet. Sclerotic aortic valve with normal opening. Mild aortic valve regurgitation is seen.  Pulmonic Valve:Structurally normal pulmonic valve, with normal leaflet excursion. The pulmonic valve is normal. No indication of pulmonic valve regurgitation.  Aorta: The aortic root and ascending aorta are structurally normal, with no evidence of dilitation. Thereis mild aortic root calcification.  Pulmonary Artery: The main pulmonary artery is normal in size.  Venous: The inferior vena cava was dilated, with respiratory size variation greater than 50%.    < end of copied text >    < from: 12 Lead ECG (03.23.20 @ 07:41) >  Ventricular Rate 99 BPM    Atrial Rate 122 BPM    QRS Duration 84 ms    Q-T Interval 298 ms    QTC Calculation(Bezet) 382 ms    R Axis 9 degrees    T Axis 120 degrees    Diagnosis Line Atrial fibrillation with premature ventricular or aberrantly conducted  complexes  Low voltage QRS  Anterior infarct , age undetermined  Possible Inferior infarct , age undetermined  ST & T wave abnormality, consider lateral ischemia  Baseline artifact  Abnormal ECG    < end of copied text > INTERVAL HPI/OVERNIGHT EVENTS:  Currently AF with RVR to 140s.  Patient without complaints.     MEDICATIONS  (STANDING):  aMIOdarone    Tablet 200 milliGRAM(s) Oral two times a day  aspirin  chewable 81 milliGRAM(s) Oral daily  atorvastatin 80 milliGRAM(s) Oral at bedtime  chlorhexidine 4% Liquid 1 Application(s) Topical daily  clopidogrel Tablet 75 milliGRAM(s) Oral daily  dextrose 5%. 1000 milliLiter(s) (50 mL/Hr) IV Continuous <Continuous>  dextrose 50% Injectable 50 milliLiter(s) IV Push every 15 minutes  dextrose 50% Injectable 25 milliLiter(s) IV Push every 15 minutes  digoxin     Tablet 0.125 milliGRAM(s) Oral daily  diltiazem    Tablet 30 milliGRAM(s) Oral once  famotidine    Tablet 20 milliGRAM(s) Oral two times a day  furosemide    Tablet 20 milliGRAM(s) Oral daily  furosemide   Injectable 20 milliGRAM(s) IV Push once  guaiFENesin  milliGRAM(s) Oral every 12 hours  insulin lispro (HumaLOG) corrective regimen sliding scale   SubCutaneous three times a day before meals  ipratropium    for Nebulization 500 MICROGram(s) Nebulizer every 6 hours  magnesium sulfate  IVPB 1 Gram(s) IV Intermittent every 12 hours  metoprolol tartrate 12.5 milliGRAM(s) Oral every 8 hours  polyethylene glycol 3350 17 Gram(s) Oral daily  potassium chloride    Tablet ER 10 milliEquivalent(s) Oral daily  senna 2 Tablet(s) Oral at bedtime  sodium chloride 0.9%. 1000 milliLiter(s) (20 mL/Hr) IV Continuous <Continuous>  warfarin 3 milliGRAM(s) Oral once    MEDICATIONS  (PRN):  acetaminophen   Tablet .. 325 milliGRAM(s) Oral every 4 hours PRN Mild Pain (1 - 3)  dextrose 40% Gel 15 Gram(s) Oral once PRN Blood Glucose LESS THAN 70 milliGRAM(s)/deciliter  glucagon  Injectable 1 milliGRAM(s) IntraMuscular once PRN Glucose LESS THAN 70 milligrams/deciliter      Allergies  No Known Allergies    REVIEW OF SYSTEMS  A ten-point review of systems was otherwise negative except as noted.    Vital Signs Last 24 Hrs  T(C): 36.6 (23 Mar 2020 08:00), Max: 36.6 (23 Mar 2020 08:00)  T(F): 97.9 (23 Mar 2020 08:00), Max: 97.9 (23 Mar 2020 08:00)  HR: 113 (23 Mar 2020 10:00) (98 - 132)  BP: 99/61 (23 Mar 2020 10:00) (87/52 - 128/64)  BP(mean): 75 (23 Mar 2020 10:00) (63 - 88)  RR: 20 (23 Mar 2020 10:00) (17 - 27)  SpO2: 99% (23 Mar 2020 10:00) (80% - 100%)    03-22-20 @ 07:01  -  03-23-20 @ 07:00  --------------------------------------------------------  IN: 820 mL / OUT: 1050 mL / NET: -230 mL    03-23-20 @ 07:01  -  03-23-20 @ 11:09  --------------------------------------------------------  IN: 240 mL / OUT: 600 mL / NET: -360 mL      Physical Exam  GENERAL: In no apparent distress, well nourished, and hydrated.  HEART: Regular rate and rhythm; No murmurs, rubs, or gallops.  PULMONARY: Clear to auscultation and perfusion.  No rales, wheezing, or rhonchi bilaterally.  CHEST: Surgical incision with steri-strips, c/d/i.   ABDOMEN: Soft, Nontender, Nondistended; Bowel sounds present  EXTREMITIES:  2+ Peripheral Pulses, No clubbing, cyanosis, or edema  NEUROLOGICAL: Grossly nonfocal    LABS:                        10.4   10.33 )-----------( 206      ( 23 Mar 2020 05:20 )             31.2     03-22    137  |  99  |  22<H>  ----------------------------<  96  3.9   |  28  |  1.0    Ca    8.3<L>      22 Mar 2020 02:00  Mg     2.5     03-23      PT/INR - ( 23 Mar 2020 05:20 )   PT: 14.80 sec;   INR: 1.29 ratio      RADIOLOGY & ADDITIONAL TESTS:  < from: Transthoracic Echocardiogram (03.20.20 @ 10:28) >  Summary:   1. Severely decreased segmental left ventricular systolic function.   2. Multiple left ventricular regional wall motion abnormalities exist. See wall motion findings.   3. LV Ejection Fraction by House's Method with a biplane EF of 26 %.   4. Normal left ventricular internal cavity size.   5. The left ventricular diastolic function could not be assessedin this study.   6. Mild aortic regurgitation.   7. Sclerotic aortic valve with normal opening.   8. LA volume Index is 34.0 ml/m² ml/m2.   9. There is mild aortic root calcification.    PHYSICIAN INTERPRETATION:  Left Ventricle: The left ventricularinternal cavity size is normal. Left ventricular wall thickness is normal. Severely decreased segmental left ventricular systolic function. The left ventricular diastolic function could not be assessed in this study.       LV Wall Scoring:  There is diffuse hypokinesis.    Right Ventricle: Normal right ventricular size and function.  Left Atrium: Mildly enlarged left atrium. LA volume Index is 34.0 ml/m² ml/m2.  Right Atrium: Normal right atrial size.  Pericardium: There is no evidence of pericardial effusion.  Mitral Valve: Structurally normal mitral valve, with normal leaflet excursion. The mitral valve is normal in structure. Mild mitral valve regurgitation is seen.  Tricuspid Valve: Structurally normal tricuspid valve, with normal leafletexcursion. The tricuspid valve is normal in structure. Trivial tricuspid regurgitation is visualized.  Aortic Valve: The aortic valve is trileaflet. Sclerotic aortic valve with normal opening. Mild aortic valve regurgitation is seen.  Pulmonic Valve:Structurally normal pulmonic valve, with normal leaflet excursion. The pulmonic valve is normal. No indication of pulmonic valve regurgitation.  Aorta: The aortic root and ascending aorta are structurally normal, with no evidence of dilitation. Thereis mild aortic root calcification.  Pulmonary Artery: The main pulmonary artery is normal in size.  Venous: The inferior vena cava was dilated, with respiratory size variation greater than 50%.    < end of copied text >    < from: 12 Lead ECG (03.23.20 @ 07:41) >  Ventricular Rate 99 BPM    Atrial Rate 122 BPM    QRS Duration 84 ms    Q-T Interval 298 ms    QTC Calculation(Bezet) 382 ms    R Axis 9 degrees    T Axis 120 degrees    Diagnosis Line Atrial fibrillation with premature ventricular or aberrantly conducted  complexes  Low voltage QRS  Anterior infarct , age undetermined  Possible Inferior infarct , age undetermined  ST & T wave abnormality, consider lateral ischemia  Baseline artifact  Abnormal ECG    < end of copied text >

## 2020-03-23 NOTE — DIETITIAN INITIAL EVALUATION ADULT. - DIET TYPE
Pt. reports good appetite, regular diet PTP. No supplement use. NKFA. Stable weight trends. No cultural/Church dietary restr.

## 2020-03-23 NOTE — PROGRESS NOTE ADULT - SUBJECTIVE AND OBJECTIVE BOX
85 y Male with h/o pancreatic and liver cancer in remission since 40 years ago presented originally in 2/2020 with fever and chills, at that time was found to be in AF RVR, converted to NSR rhythm spontaneously was positive for PNA, EKG revealed inferior STEMI, patient had LHC at that time and RCA PCI of culprit lesion. LHC revealed extensive CAD of LM, LAD, RPDA, and RI. Patient underwent elective 3v CABG, and post op developed AF RVR.   Received Amio bolus x2 and drip, Procainamide, unsuccessful DCCV x2.     POD # 7 - CABG x 4    Vital Signs Last 24 Hrs  T(C): 36.6 (23 Mar 2020 08:00), Max: 36.6 (23 Mar 2020 08:00)  T(F): 97.9 (23 Mar 2020 08:00), Max: 97.9 (23 Mar 2020 08:00)  HR: 113 (23 Mar 2020 10:00) (98 - 132)  BP: 99/61 (23 Mar 2020 10:00) (87/52 - 128/64)  BP(mean): 75 (23 Mar 2020 10:00) (63 - 88)  RR: 20 (23 Mar 2020 10:00) (17 - 27)  SpO2: 99% (23 Mar 2020 10:00) (80% - 100%)    alert, awake, verbal  follows commands  S1S2 irreg rate/rhythm  diminished sounds at left base  soft abdomen, non distended  moves all 4 ext, no edema    CXR- left pleural effusion  plt 206, INR 1.3    a/p:    CAD s/p CABG x4  A fib/flutter  Acute post thoracotomy pain    PO ASA 81, PO plavix and PO eliquis 2.5 bid  NPO past midnight for possible cardioversion tomorrow  PO amio, PO statin, dig 0.125 po qd  lopressor 12.5 tid  lasix 20 mg x 1 IV.  PO diet with glycemic control

## 2020-03-24 ENCOUNTER — TRANSCRIPTION ENCOUNTER (OUTPATIENT)
Age: 85
End: 2020-03-24

## 2020-03-24 VITALS
DIASTOLIC BLOOD PRESSURE: 60 MMHG | SYSTOLIC BLOOD PRESSURE: 112 MMHG | OXYGEN SATURATION: 100 % | TEMPERATURE: 98 F | HEART RATE: 82 BPM

## 2020-03-24 LAB
ANION GAP SERPL CALC-SCNC: 10 MMOL/L — SIGNIFICANT CHANGE UP (ref 7–14)
BUN SERPL-MCNC: 15 MG/DL — SIGNIFICANT CHANGE UP (ref 10–20)
CALCIUM SERPL-MCNC: 8.1 MG/DL — LOW (ref 8.5–10.1)
CHLORIDE SERPL-SCNC: 98 MMOL/L — SIGNIFICANT CHANGE UP (ref 98–110)
CO2 SERPL-SCNC: 27 MMOL/L — SIGNIFICANT CHANGE UP (ref 17–32)
CREAT SERPL-MCNC: 0.9 MG/DL — SIGNIFICANT CHANGE UP (ref 0.7–1.5)
GLUCOSE BLDC GLUCOMTR-MCNC: 113 MG/DL — HIGH (ref 70–99)
GLUCOSE BLDC GLUCOMTR-MCNC: 117 MG/DL — HIGH (ref 70–99)
GLUCOSE SERPL-MCNC: 104 MG/DL — HIGH (ref 70–99)
HCT VFR BLD CALC: 27.8 % — LOW (ref 42–52)
HGB BLD-MCNC: 9.1 G/DL — LOW (ref 14–18)
INR BLD: 1.5 RATIO — HIGH (ref 0.65–1.3)
MAGNESIUM SERPL-MCNC: 2.6 MG/DL — HIGH (ref 1.8–2.4)
MCHC RBC-ENTMCNC: 31.1 PG — HIGH (ref 27–31)
MCHC RBC-ENTMCNC: 32.7 G/DL — SIGNIFICANT CHANGE UP (ref 32–37)
MCV RBC AUTO: 94.9 FL — HIGH (ref 80–94)
NRBC # BLD: 0 /100 WBCS — SIGNIFICANT CHANGE UP (ref 0–0)
PLATELET # BLD AUTO: 204 K/UL — SIGNIFICANT CHANGE UP (ref 130–400)
POTASSIUM SERPL-MCNC: 4.4 MMOL/L — SIGNIFICANT CHANGE UP (ref 3.5–5)
POTASSIUM SERPL-SCNC: 4.4 MMOL/L — SIGNIFICANT CHANGE UP (ref 3.5–5)
PROTHROM AB SERPL-ACNC: 17.2 SEC — HIGH (ref 9.95–12.87)
RBC # BLD: 2.93 M/UL — LOW (ref 4.7–6.1)
RBC # FLD: 15.4 % — HIGH (ref 11.5–14.5)
SODIUM SERPL-SCNC: 135 MMOL/L — SIGNIFICANT CHANGE UP (ref 135–146)
WBC # BLD: 7.73 K/UL — SIGNIFICANT CHANGE UP (ref 4.8–10.8)
WBC # FLD AUTO: 7.73 K/UL — SIGNIFICANT CHANGE UP (ref 4.8–10.8)

## 2020-03-24 PROCEDURE — 99233 SBSQ HOSP IP/OBS HIGH 50: CPT

## 2020-03-24 PROCEDURE — 93010 ELECTROCARDIOGRAM REPORT: CPT

## 2020-03-24 PROCEDURE — 71045 X-RAY EXAM CHEST 1 VIEW: CPT | Mod: 26

## 2020-03-24 PROCEDURE — 99232 SBSQ HOSP IP/OBS MODERATE 35: CPT

## 2020-03-24 RX ORDER — POTASSIUM CHLORIDE 20 MEQ
1 PACKET (EA) ORAL
Qty: 7 | Refills: 0
Start: 2020-03-24

## 2020-03-24 RX ORDER — APIXABAN 2.5 MG/1
1 TABLET, FILM COATED ORAL
Qty: 60 | Refills: 0
Start: 2020-03-24

## 2020-03-24 RX ORDER — FAMOTIDINE 10 MG/ML
1 INJECTION INTRAVENOUS
Qty: 60 | Refills: 0
Start: 2020-03-24

## 2020-03-24 RX ORDER — ACETAMINOPHEN 500 MG
1 TABLET ORAL
Qty: 0 | Refills: 0 | DISCHARGE
Start: 2020-03-24

## 2020-03-24 RX ORDER — AMIODARONE HYDROCHLORIDE 400 MG/1
1 TABLET ORAL
Qty: 60 | Refills: 0
Start: 2020-03-24

## 2020-03-24 RX ORDER — FUROSEMIDE 40 MG
1 TABLET ORAL
Qty: 7 | Refills: 0
Start: 2020-03-24

## 2020-03-24 RX ADMIN — Medication 10 MILLIEQUIVALENT(S): at 11:34

## 2020-03-24 RX ADMIN — CLOPIDOGREL BISULFATE 75 MILLIGRAM(S): 75 TABLET, FILM COATED ORAL at 11:33

## 2020-03-24 RX ADMIN — Medication 0.12 MILLIGRAM(S): at 05:24

## 2020-03-24 RX ADMIN — Medication 81 MILLIGRAM(S): at 11:31

## 2020-03-24 RX ADMIN — AMIODARONE HYDROCHLORIDE 200 MILLIGRAM(S): 400 TABLET ORAL at 05:24

## 2020-03-24 RX ADMIN — Medication 100 GRAM(S): at 05:27

## 2020-03-24 RX ADMIN — Medication 12.5 MILLIGRAM(S): at 05:24

## 2020-03-24 RX ADMIN — FAMOTIDINE 20 MILLIGRAM(S): 10 INJECTION INTRAVENOUS at 05:24

## 2020-03-24 RX ADMIN — APIXABAN 2.5 MILLIGRAM(S): 2.5 TABLET, FILM COATED ORAL at 05:25

## 2020-03-24 RX ADMIN — CHLORHEXIDINE GLUCONATE 1 APPLICATION(S): 213 SOLUTION TOPICAL at 11:32

## 2020-03-24 RX ADMIN — POLYETHYLENE GLYCOL 3350 17 GRAM(S): 17 POWDER, FOR SOLUTION ORAL at 11:33

## 2020-03-24 RX ADMIN — Medication 20 MILLIGRAM(S): at 05:24

## 2020-03-24 RX ADMIN — Medication 600 MILLIGRAM(S): at 05:24

## 2020-03-24 NOTE — DISCHARGE NOTE PROVIDER - PROVIDER TOKENS
PROVIDER:[TOKEN:[[id number]],SCHEDULEDAPPT:[03/31/2020],SCHEDULEDAPPTTIME:[01:00 PM]],PROVIDER:[TOKEN:[[id number]],FOLLOWUP:[2 weeks]]

## 2020-03-24 NOTE — DISCHARGE NOTE PROVIDER - HOSPITAL COURSE
84 y/o M with PMH of pancreatic and liver cancer in remission since 40 years ago presented initially for new onset rigors an hour prior to presentation associated with lightheadedness and cough. Patient lives in [place] and is fully functional and takes care of all ADL without restrictions. Patient was initially treated for pneumonia and new onset with RVR. Pt converted back to sinus without intervention. Repeat EKG is done showing acute inferior STEMI. Pt was transferred to [facility] for cath in which RCA stent was place. On 02/12/2020 pt underwent cardiac cath and IVUS, found to have 3vCAD- Ct surgery consulted for possible CABG.  On 3/16/2020 the patient underwent CABG x 4.  Post-operatively the patient had atrial fibrillation and was treated with amiodarone.  The patient was electrically cardioverted and is now in NSR.  The patient was discharged home in stable condition on POD #8.

## 2020-03-24 NOTE — PROGRESS NOTE ADULT - SUBJECTIVE AND OBJECTIVE BOX
INTERVAL HPI/OVERNIGHT EVENTS:  Patient s/p DCCV yesterday, remains in SR. Patient without complaints at this time.     MEDICATIONS  (STANDING):  aMIOdarone    Tablet 200 milliGRAM(s) Oral two times a day  apixaban 2.5 milliGRAM(s) Oral two times a day  aspirin  chewable 81 milliGRAM(s) Oral daily  atorvastatin 80 milliGRAM(s) Oral at bedtime  chlorhexidine 4% Liquid 1 Application(s) Topical daily  clopidogrel Tablet 75 milliGRAM(s) Oral daily  famotidine    Tablet 20 milliGRAM(s) Oral two times a day  furosemide    Tablet 20 milliGRAM(s) Oral daily  guaiFENesin  milliGRAM(s) Oral every 12 hours  insulin lispro (HumaLOG) corrective regimen sliding scale   SubCutaneous three times a day before meals  ipratropium    for Nebulization 500 MICROGram(s) Nebulizer every 6 hours  magnesium sulfate  IVPB 1 Gram(s) IV Intermittent every 12 hours  metoprolol tartrate 12.5 milliGRAM(s) Oral every 8 hours  polyethylene glycol 3350 17 Gram(s) Oral daily  potassium chloride    Tablet ER 10 milliEquivalent(s) Oral daily  senna 2 Tablet(s) Oral at bedtime  sodium chloride 0.9%. 1000 milliLiter(s) (20 mL/Hr) IV Continuous <Continuous>    MEDICATIONS  (PRN):  acetaminophen   Tablet .. 325 milliGRAM(s) Oral every 4 hours PRN Mild Pain (1 - 3)  dextrose 40% Gel 15 Gram(s) Oral once PRN Blood Glucose LESS THAN 70 milliGRAM(s)/deciliter  glucagon  Injectable 1 milliGRAM(s) IntraMuscular once PRN Glucose LESS THAN 70 milligrams/deciliter      Allergies  No Known Allergies    REVIEW OF SYSTEMS  A ten-point review of systems was otherwise negative except as noted.    Vital Signs Last 24 Hrs  T(C): 36.7 (23 Mar 2020 20:00), Max: 36.7 (23 Mar 2020 12:00)  T(F): 98 (23 Mar 2020 20:00), Max: 98.1 (23 Mar 2020 12:00)  HR: 81 (24 Mar 2020 08:00) (66 - 135)  BP: 108/59 (24 Mar 2020 08:00) (77/51 - 120/61)  BP(mean): 80 (24 Mar 2020 08:00) (59 - 85)  RR: 21 (24 Mar 2020 08:00) (15 - 39)  SpO2: 100% (24 Mar 2020 08:00) (95% - 100%)    03-23-20 @ 07:01  -  03-24-20 @ 07:00  --------------------------------------------------------  IN: 930 mL / OUT: 1125 mL / NET: -195 mL    03-24-20 @ 07:01  -  03-24-20 @ 10:54  --------------------------------------------------------  IN: 240 mL / OUT: 200 mL / NET: 40 mL      Physical Exam  GENERAL: Elderly gentleman, In no apparent distress  HEART: Regular rate and rhythm; No murmurs, rubs, or gallops.  PULMONARY: Clear to auscultation and perfusion.  No rales, wheezing, or rhonchi bilaterally.  ABDOMEN: Soft, Nontender, Nondistended; Bowel sounds present  EXTREMITIES:  2+ Peripheral Pulses, No clubbing, cyanosis, or edema  NEUROLOGICAL: Grossly nonfocal    LABS:                        9.1    7.73  )-----------( 204      ( 24 Mar 2020 04:30 )             27.8     03-24    135  |  98  |  15  ----------------------------<  104<H>  4.4   |  27  |  0.9    Ca    8.1<L>      24 Mar 2020 04:30  Mg     2.6     03-24      PT/INR - ( 24 Mar 2020 04:30 )   PT: 17.20 sec;   INR: 1.50 ratio         RADIOLOGY & ADDITIONAL TESTS:  < from: Transthoracic Echocardiogram (03.20.20 @ 10:28) >  Summary:   1. Severely decreased segmental left ventricular systolic function.   2. Multiple left ventricular regional wall motion abnormalities exist. See wall motion findings.   3. LV Ejection Fraction by House's Method with a biplane EF of 26 %.   4. Normal left ventricular internal cavity size.   5. The left ventricular diastolic function could not be assessedin this study.   6. Mild aortic regurgitation.   7. Sclerotic aortic valve with normal opening.   8. LA volume Index is 34.0 ml/m² ml/m2.   9. There is mild aortic root calcification.    PHYSICIAN INTERPRETATION:  Left Ventricle: The left ventricularinternal cavity size is normal. Left ventricular wall thickness is normal. Severely decreased segmental left ventricular systolic function. The left ventricular diastolic function could not be assessed in this study.       LV Wall Scoring:  There is diffuse hypokinesis.    Right Ventricle: Normal right ventricular size and function.  Left Atrium: Mildly enlarged left atrium. LA volume Index is 34.0 ml/m² ml/m2.  Right Atrium: Normal right atrial size.  Pericardium: There is no evidence of pericardial effusion.  Mitral Valve: Structurally normal mitral valve, with normal leaflet excursion. The mitral valve is normal in structure. Mild mitral valve regurgitation is seen.  Tricuspid Valve: Structurally normal tricuspid valve, with normal leafletexcursion. The tricuspid valve is normal in structure. Trivial tricuspid regurgitation is visualized.  Aortic Valve: The aortic valve is trileaflet. Sclerotic aortic valve with normal opening. Mild aortic valve regurgitation is seen.  Pulmonic Valve:Structurally normal pulmonic valve, with normal leaflet excursion. The pulmonic valve is normal. No indication of pulmonic valve regurgitation.  Aorta: The aortic root and ascending aorta are structurally normal, with no evidence of dilitation. Thereis mild aortic root calcification.  Pulmonary Artery: The main pulmonary artery is normal in size.  Venous: The inferior vena cava was dilated, with respiratory size variation greater than 50%.    < end of copied text >

## 2020-03-24 NOTE — PROGRESS NOTE ADULT - SUBJECTIVE AND OBJECTIVE BOX
OPERATIVE PROCEDURE(s):                POD #                       SURGEON(s): IFEANYI Garcia  SUBJECTIVE ASSESSMENT:85yMale patient seen and examined at bedside.    Vital Signs Last 24 Hrs  T(F): 98 (23 Mar 2020 20:00), Max: 98.1 (23 Mar 2020 12:00)  HR: 79 (24 Mar 2020 06:00) (66 - 135)  BP: 108/55 (24 Mar 2020 06:00) (77/51 - 120/61)  BP(mean): 77 (24 Mar 2020 06:00) (59 - 85)  ABP: --  ABP(mean): --  RR: 24 (24 Mar 2020 06:00) (15 - 39)  SpO2: 100% (24 Mar 2020 06:00) (95% - 100%)  CVP(mm Hg): --  CVP(cm H2O): --  CO: --  CI: --  PA: --  SVR: --    I&O's Detail    23 Mar 2020 07:01  -  24 Mar 2020 07:00  --------------------------------------------------------  IN:    IV PiggyBack: 450 mL    Oral Fluid: 480 mL  Total IN: 930 mL    OUT:    Voided: 1125 mL  Total OUT: 1125 mL        Net: I&O's Detail    22 Mar 2020 07:01  -  23 Mar 2020 07:00  --------------------------------------------------------  Total NET: -230 mL      23 Mar 2020 07:01  -  24 Mar 2020 07:00  --------------------------------------------------------  Total NET: -195 mL        CAPILLARY BLOOD GLUCOSE      POCT Blood Glucose.: 117 mg/dL (24 Mar 2020 06:32)  POCT Blood Glucose.: 107 mg/dL (23 Mar 2020 15:53)  POCT Blood Glucose.: 129 mg/dL (23 Mar 2020 13:44)  POCT Blood Glucose.: 124 mg/dL (23 Mar 2020 11:18)      Physical Exam:  General: NAD; A&Ox3  Cardiac: S1/S2, RRR, no murmur, no rubs  Lungs: unlabored respirations, CTA b/l, no wheeze, no rales, no crackles  Abdomen: Soft/NT/ND; positive bowel sounds x 4  Sternum: Intact, no click, incision healing well with no drainage  Incisions: Incisions clean/dry/intact  Extremities: No edema b/l lower extremities; good capillary refill; no cyanosis; palpable 1+ pedal pulses b/l    Central Venous Catheter: Yes[]  No[] , If Yes indication:                    Day #  Biggs Catheter: Yes  [] , No  [] , If yes indication:                                 Day #  NGT: Yes [] No [] ,    If Yes Placement:                                                   Day #  EPICARDIAL WIRES:  [] YES [] NO                                                            Day #  BOWEL MOVEMENT:  [] YES [] NO, If No, Timing since last BM Day #  CHEST TUBE(Left/Right):  [] YES [] NO, If yes -  AIR LEAKS:  [] YES [] NO        LABS:                        9.1<L>  7.73  )-----------( 204      ( 24 Mar 2020 04:30 )             27.8<L>                        10.4<L>  10.33 )-----------( 206      ( 23 Mar 2020 05:20 )             31.2<L>    03-24    135  |  98  |  15  ----------------------------<  104<H>  4.4   |  27  |  0.9  03-22    137  |  99  |  22<H>  ----------------------------<  96  3.9   |  28  |  1.0    Ca    8.1<L>      24 Mar 2020 04:30  Mg     2.6     03-24      PT/INR - ( 24 Mar 2020 04:30 )   PT: ;   INR: 1.50 ratio         PT/INR - ( 23 Mar 2020 05:20 )   PT: ;   INR: 1.29 ratio               RADIOLOGY & ADDITIONAL TESTS:  CXR:   EKG:  Allergies    No Known Allergies    Intolerances      MEDICATIONS  (STANDING):  aMIOdarone    Tablet 200 milliGRAM(s) Oral two times a day  apixaban 2.5 milliGRAM(s) Oral two times a day  aspirin  chewable 81 milliGRAM(s) Oral daily  atorvastatin 80 milliGRAM(s) Oral at bedtime  chlorhexidine 4% Liquid 1 Application(s) Topical daily  clopidogrel Tablet 75 milliGRAM(s) Oral daily  dextrose 5%. 1000 milliLiter(s) (50 mL/Hr) IV Continuous <Continuous>  dextrose 50% Injectable 50 milliLiter(s) IV Push every 15 minutes  dextrose 50% Injectable 25 milliLiter(s) IV Push every 15 minutes  digoxin     Tablet 0.125 milliGRAM(s) Oral daily  famotidine    Tablet 20 milliGRAM(s) Oral two times a day  furosemide    Tablet 20 milliGRAM(s) Oral daily  guaiFENesin  milliGRAM(s) Oral every 12 hours  insulin lispro (HumaLOG) corrective regimen sliding scale   SubCutaneous three times a day before meals  ipratropium    for Nebulization 500 MICROGram(s) Nebulizer every 6 hours  magnesium sulfate  IVPB 1 Gram(s) IV Intermittent every 12 hours  metoprolol tartrate 12.5 milliGRAM(s) Oral every 8 hours  polyethylene glycol 3350 17 Gram(s) Oral daily  potassium chloride    Tablet ER 10 milliEquivalent(s) Oral daily  senna 2 Tablet(s) Oral at bedtime  sodium chloride 0.9%. 1000 milliLiter(s) (20 mL/Hr) IV Continuous <Continuous>    MEDICATIONS  (PRN):  acetaminophen   Tablet .. 325 milliGRAM(s) Oral every 4 hours PRN Mild Pain (1 - 3)  dextrose 40% Gel 15 Gram(s) Oral once PRN Blood Glucose LESS THAN 70 milliGRAM(s)/deciliter  glucagon  Injectable 1 milliGRAM(s) IntraMuscular once PRN Glucose LESS THAN 70 milligrams/deciliter      Pharmacologic DVT Prophylaxis: [] YES, []NO: Contraindication:   [] HEPARIN: Dose: XX mg  Q24H    [] LOVENOX: Dose: XX mg  Q24H                 SCD's: YES b/l    GI Prophylaxis: Protonix [], Pepcid []    Post-Op Beta-Blockers: []Yes, []No: contraindication:  Post-Op Nitrate: []Yes, []No: contraindication:  Post-Op Aspirin: []Yes,  []No: contraindication:  Post-Op Statin: []Yes, []No: contraindication:      Ambulation/Activity Status:    Assessment/Plan:  85y Male status-post  - Case and plan discussed with CTU Intensivist and CT Surgeon - Dr. Powell/Radha/Anuradha   - Continue CTU supportive care and ongoing plan of care as per continuing CTU rounds.    - Continue DVT/GI prophylaxis  - Incentive Spirometry 10 times an hour  - Continue to advance physical activity as tolerated and continue PT/OT as directed  1. CAD: Continue ASA, statin, BB  2. HTN:   3. A. Fib:   4. COPD/Hypoxia:   5. DM/Glucose Control:     Social Service Disposition: OPERATIVE PROCEDURE(s):   CABG4 w/post-op Afib            POD #   8                    SURGEON(s): IFEANYI Garcia  SUBJECTIVE ASSESSMENT: 85y Male patient seen and examined at bedside. Patient denies any acute complaints at this time.     Vital Signs Last 24 Hrs  T(F): 98 (23 Mar 2020 20:00), Max: 98.1 (23 Mar 2020 12:00)  HR: 79 (24 Mar 2020 06:00) (66 - 135)  BP: 108/55 (24 Mar 2020 06:00) (77/51 - 120/61)  BP(mean): 77 (24 Mar 2020 06:00) (59 - 85)  RR: 24 (24 Mar 2020 06:00) (15 - 39)  SpO2: 100% (24 Mar 2020 06:00) (95% - 100%)    I&O's Detail    23 Mar 2020 07:01  -  24 Mar 2020 07:00  --------------------------------------------------------  IN:    IV PiggyBack: 450 mL    Oral Fluid: 480 mL  Total IN: 930 mL    OUT:    Voided: 1125 mL  Total OUT: 1125 mL    Net: I&O's Detail    22 Mar 2020 07:01  -  23 Mar 2020 07:00  --------------------------------------------------------  Total NET: -230 mL    23 Mar 2020 07:01  -  24 Mar 2020 07:00  --------------------------------------------------------  Total NET: -195 mL      CAPILLARY BLOOD GLUCOSE  POCT Blood Glucose.: 117 mg/dL (24 Mar 2020 06:32)  POCT Blood Glucose.: 107 mg/dL (23 Mar 2020 15:53)  POCT Blood Glucose.: 129 mg/dL (23 Mar 2020 13:44)  POCT Blood Glucose.: 124 mg/dL (23 Mar 2020 11:18)      Physical Exam:  General: NAD; A&Ox3  Cardiac: S1/S2, RRR, no murmur, no rubs  Lungs: unlabored respirations, CTA b/l, no wheeze, no rales, no crackles  Abdomen: Soft/NT/ND; positive bowel sounds x 4  Sternum: Intact, no click, incision healing well with no drainage  Incisions: Incisions clean/dry/intact  Extremities: No edema b/l lower extremities; good capillary refill; no cyanosis; palpable 1+ pedal pulses b/l        LABS:                        9.1<L>  7.73  )-----------( 204      ( 24 Mar 2020 04:30 )             27.8<L>                        10.4<L>  10.33 )-----------( 206      ( 23 Mar 2020 05:20 )             31.2<L>    03-24    135  |  98  |  15  ----------------------------<  104<H>  4.4   |  27  |  0.9  03-22    137  |  99  |  22<H>  ----------------------------<  96  3.9   |  28  |  1.0    Ca    8.1<L>      24 Mar 2020 04:30  Mg     2.6     03-24    PT/INR - ( 24 Mar 2020 04:30 )   PT: ;   INR: 1.50 ratio       PT/INR - ( 23 Mar 2020 05:20 )   PT: ;   INR: 1.29 ratio        RADIOLOGY & ADDITIONAL TESTS:  CXR: < from: Xray Chest 1 View- PORTABLE-Routine (03.24.20 @ 04:20) >  Impression: Bibasilar opacities/effusions, left greater than right without significant change.  < end of copied text >    EKG: < from: 12 Lead ECG (03.23.20 @ 07:41) >  Ventricular Rate 99 BPM  Atrial Rate 122 BPM  QRS Duration 84 ms  Q-T Interval 298 ms  QTC Calculation(Bezet) 382 ms  R Axis 9 degrees  T Axis 120 degrees  Diagnosis Line Atrial fibrillation with premature ventricular or aberrantly conducted  complexes  Low voltage QRS  Anterior infarct , age undetermined  Possible Inferior infarct , age undetermined  ST & T wave abnormality, consider lateral ischemia  Baseline artifact  Abnormal ECG  Confirmed by Alanna Rai MD [id number]) on 3/23/2020 9:12:45 AM  < end of copied text >      Allergies  No Known Allergies  Intolerances      MEDICATIONS  (STANDING):  aMIOdarone    Tablet 200 milliGRAM(s) Oral two times a day  apixaban 2.5 milliGRAM(s) Oral two times a day  aspirin  chewable 81 milliGRAM(s) Oral daily  atorvastatin 80 milliGRAM(s) Oral at bedtime  chlorhexidine 4% Liquid 1 Application(s) Topical daily  clopidogrel Tablet 75 milliGRAM(s) Oral daily  dextrose 5%. 1000 milliLiter(s) (50 mL/Hr) IV Continuous <Continuous>  dextrose 50% Injectable 50 milliLiter(s) IV Push every 15 minutes  dextrose 50% Injectable 25 milliLiter(s) IV Push every 15 minutes  digoxin     Tablet 0.125 milliGRAM(s) Oral daily  famotidine    Tablet 20 milliGRAM(s) Oral two times a day  furosemide    Tablet 20 milliGRAM(s) Oral daily  guaiFENesin  milliGRAM(s) Oral every 12 hours  insulin lispro (HumaLOG) corrective regimen sliding scale   SubCutaneous three times a day before meals  ipratropium    for Nebulization 500 MICROGram(s) Nebulizer every 6 hours  magnesium sulfate  IVPB 1 Gram(s) IV Intermittent every 12 hours  metoprolol tartrate 12.5 milliGRAM(s) Oral every 8 hours  polyethylene glycol 3350 17 Gram(s) Oral daily  potassium chloride    Tablet ER 10 milliEquivalent(s) Oral daily  senna 2 Tablet(s) Oral at bedtime  sodium chloride 0.9%. 1000 milliLiter(s) (20 mL/Hr) IV Continuous <Continuous>    MEDICATIONS  (PRN):  acetaminophen   Tablet .. 325 milliGRAM(s) Oral every 4 hours PRN Mild Pain (1 - 3)  dextrose 40% Gel 15 Gram(s) Oral once PRN Blood Glucose LESS THAN 70 milliGRAM(s)/deciliter  glucagon  Injectable 1 milliGRAM(s) IntraMuscular once PRN Glucose LESS THAN 70 milligrams/deciliter      Ambulation/Activity Status: OOB/AMB    Assessment/Plan:  85y Male status-post CABG4 w/post-op Afib            POD #   8   - Case and plan discussed with CTU Intensivist and CT Surgeon - Dr. Powell/Radha/Anuradha   - Continue CTU supportive care and ongoing plan of care as per continuing CTU rounds.    - Continue DVT/GI prophylaxis  - Incentive Spirometry 10 times an hour  - Continue to advance physical activity as tolerated and continue PT/OT as directed  1. CAD: Continue ASA and plavix, statin, BB  2. A. Fib: D/c digoxin, continue with aMIOdarone    Tablet 200 milliGRAM(s) Oral two times a day and metoprolol tartrate 12.5 milliGRAM(s) Oral every 8 hours with apixaban 2.5 milliGRAM(s) Oral two times a day for ac.   4. ICM EF 31%: Life vest in place, F/U with Dr. Connors in 3 months to repeat echo and re-assess need for ICD if EF does not improve.    Social Service Disposition: OPERATIVE PROCEDURE(s):   CABG4 w/post-op Afib            POD #   8                    SURGEON(s): IFEANYI Garcia  SUBJECTIVE ASSESSMENT: 85y Male patient seen and examined at bedside. Patient denies any acute complaints at this time.     Vital Signs Last 24 Hrs  T(F): 98 (23 Mar 2020 20:00), Max: 98.1 (23 Mar 2020 12:00)  HR: 79 (24 Mar 2020 06:00) (66 - 135)  BP: 108/55 (24 Mar 2020 06:00) (77/51 - 120/61)  BP(mean): 77 (24 Mar 2020 06:00) (59 - 85)  RR: 24 (24 Mar 2020 06:00) (15 - 39)  SpO2: 100% (24 Mar 2020 06:00) (95% - 100%)    I&O's Detail    23 Mar 2020 07:01  -  24 Mar 2020 07:00  --------------------------------------------------------  IN:    IV PiggyBack: 450 mL    Oral Fluid: 480 mL  Total IN: 930 mL    OUT:    Voided: 1125 mL  Total OUT: 1125 mL    Net: I&O's Detail    22 Mar 2020 07:01  -  23 Mar 2020 07:00  --------------------------------------------------------  Total NET: -230 mL    23 Mar 2020 07:01  -  24 Mar 2020 07:00  --------------------------------------------------------  Total NET: -195 mL      CAPILLARY BLOOD GLUCOSE  POCT Blood Glucose.: 117 mg/dL (24 Mar 2020 06:32)  POCT Blood Glucose.: 107 mg/dL (23 Mar 2020 15:53)  POCT Blood Glucose.: 129 mg/dL (23 Mar 2020 13:44)  POCT Blood Glucose.: 124 mg/dL (23 Mar 2020 11:18)      Physical Exam:  General: NAD; A&Ox3  Cardiac: S1/S2, RRR, no murmur, no rubs  Lungs: unlabored respirations, CTA b/l, no wheeze, no rales, no crackles  Abdomen: Soft/NT/ND; positive bowel sounds x 4  Sternum: Intact, no click, incision healing well with no drainage  Incisions: Incisions clean/dry/intact  Extremities: No edema b/l lower extremities; good capillary refill; no cyanosis; palpable 1+ pedal pulses b/l        LABS:                        9.1<L>  7.73  )-----------( 204      ( 24 Mar 2020 04:30 )             27.8<L>                        10.4<L>  10.33 )-----------( 206      ( 23 Mar 2020 05:20 )             31.2<L>    03-24    135  |  98  |  15  ----------------------------<  104<H>  4.4   |  27  |  0.9  03-22    137  |  99  |  22<H>  ----------------------------<  96  3.9   |  28  |  1.0    Ca    8.1<L>      24 Mar 2020 04:30  Mg     2.6     03-24    PT/INR - ( 24 Mar 2020 04:30 )   PT: ;   INR: 1.50 ratio       PT/INR - ( 23 Mar 2020 05:20 )   PT: ;   INR: 1.29 ratio        RADIOLOGY & ADDITIONAL TESTS:  CXR: < from: Xray Chest 1 View- PORTABLE-Routine (03.24.20 @ 04:20) >  Impression: Bibasilar opacities/effusions, left greater than right without significant change.  < end of copied text >    EKG: < from: 12 Lead ECG (03.23.20 @ 07:41) >  Ventricular Rate 99 BPM  Atrial Rate 122 BPM  QRS Duration 84 ms  Q-T Interval 298 ms  QTC Calculation(Bezet) 382 ms  R Axis 9 degrees  T Axis 120 degrees  Diagnosis Line Atrial fibrillation with premature ventricular or aberrantly conducted  complexes  Low voltage QRS  Anterior infarct , age undetermined  Possible Inferior infarct , age undetermined  ST & T wave abnormality, consider lateral ischemia  Baseline artifact  Abnormal ECG  Confirmed by Alanna Rai MD ([id number]) on 3/23/2020 9:12:45 AM  < end of copied text >      Allergies  No Known Allergies  Intolerances      MEDICATIONS  (STANDING):  aMIOdarone    Tablet 200 milliGRAM(s) Oral two times a day  apixaban 2.5 milliGRAM(s) Oral two times a day  aspirin  chewable 81 milliGRAM(s) Oral daily  atorvastatin 80 milliGRAM(s) Oral at bedtime  chlorhexidine 4% Liquid 1 Application(s) Topical daily  clopidogrel Tablet 75 milliGRAM(s) Oral daily  dextrose 5%. 1000 milliLiter(s) (50 mL/Hr) IV Continuous <Continuous>  dextrose 50% Injectable 50 milliLiter(s) IV Push every 15 minutes  dextrose 50% Injectable 25 milliLiter(s) IV Push every 15 minutes  digoxin     Tablet 0.125 milliGRAM(s) Oral daily  famotidine    Tablet 20 milliGRAM(s) Oral two times a day  furosemide    Tablet 20 milliGRAM(s) Oral daily  guaiFENesin  milliGRAM(s) Oral every 12 hours  insulin lispro (HumaLOG) corrective regimen sliding scale   SubCutaneous three times a day before meals  ipratropium    for Nebulization 500 MICROGram(s) Nebulizer every 6 hours  magnesium sulfate  IVPB 1 Gram(s) IV Intermittent every 12 hours  metoprolol tartrate 12.5 milliGRAM(s) Oral every 8 hours  polyethylene glycol 3350 17 Gram(s) Oral daily  potassium chloride    Tablet ER 10 milliEquivalent(s) Oral daily  senna 2 Tablet(s) Oral at bedtime  sodium chloride 0.9%. 1000 milliLiter(s) (20 mL/Hr) IV Continuous <Continuous>    MEDICATIONS  (PRN):  acetaminophen   Tablet .. 325 milliGRAM(s) Oral every 4 hours PRN Mild Pain (1 - 3)  dextrose 40% Gel 15 Gram(s) Oral once PRN Blood Glucose LESS THAN 70 milliGRAM(s)/deciliter  glucagon  Injectable 1 milliGRAM(s) IntraMuscular once PRN Glucose LESS THAN 70 milligrams/deciliter      Ambulation/Activity Status: OOB/AMB    Assessment/Plan:  85y Male status-post CABG4 w/post-op Afib            POD #   8   - Case and plan discussed with CTU Intensivist and CT Surgeon - Dr. Powell/Radha/Anuradha   - Continue CTU supportive care and ongoing plan of care as per continuing CTU rounds.    - Continue DVT/GI prophylaxis  - Incentive Spirometry 10 times an hour  - Continue to advance physical activity as tolerated and continue PT/OT as directed  1. CAD: Continue ASA and plavix, statin, BB  2. A. Fib: D/c digoxin, continue with aMIOdarone    Tablet 200 milliGRAM(s) Oral two times a day and metoprolol tartrate 12.5 milliGRAM(s) Oral every 8 hours with apixaban 2.5 milliGRAM(s) Oral two times a day for ac.   4. ICM EF 31%: Life vest in place, F/U with Dr. Connors in 3 months to repeat echo and re-assess need for ICD if EF does not improve.    Social Service Disposition:  Home today

## 2020-03-24 NOTE — DISCHARGE NOTE PROVIDER - NSDCMRMEDTOKEN_GEN_ALL_CORE_FT
acetaminophen 325 mg oral tablet: 1 tab(s) orally every 4 hours, As needed, Mild Pain (1 - 3)  amiodarone 200 mg oral tablet: 1 tab(s) orally 2 times a day  apixaban 2.5 mg oral tablet: 1 tab(s) orally 2 times a day  aspirin 81 mg oral tablet, chewable: 1 tab(s) orally once a day  atorvastatin 80 mg oral tablet: 1 tab(s) orally once a day (at bedtime)  clopidogrel 75 mg oral tablet: 1 tab(s) orally once a day  famotidine 20 mg oral tablet: 1 tab(s) orally 2 times a day  furosemide 20 mg oral tablet: 1 tab(s) orally once a day  metoprolol succinate 25 mg oral capsule, extended release: 1 cap(s) orally once a day   oxycodone-acetaminophen 5 mg-325 mg oral tablet: 2 tab(s) orally every 6 hours, As Needed -for moderate pain MDD:8   potassium chloride 10 mEq oral tablet, extended release: 1 tab(s) orally once a day

## 2020-03-24 NOTE — PROGRESS NOTE ADULT - SUBJECTIVE AND OBJECTIVE BOX
85 y Male with h/o pancreatic and liver cancer in remission since 40 years ago presented originally in 2/2020 with fever and chills, at that time was found to be in AF RVR, converted to NSR rhythm spontaneously was positive for PNA, EKG revealed inferior STEMI, patient had LHC at that time and RCA PCI of culprit lesion. LHC revealed extensive CAD of LM, LAD, RPDA, and RI. Patient underwent elective 3v CABG, and post op developed AF RVR.   Received Amio bolus x2 and drip, Procainamide, unsuccessful DCCV x2.     POD # 8 - CABG x 4  s/p Cardioversion yesterday after 1 gm of Procainamide given    Vital Signs Last 24 Hrs  T(C): 36.7 (23 Mar 2020 20:00), Max: 36.7 (23 Mar 2020 12:00)  T(F): 98 (23 Mar 2020 20:00), Max: 98.1 (23 Mar 2020 12:00)  HR: 81 (24 Mar 2020 08:00) (66 - 135)  BP: 108/59 (24 Mar 2020 08:00) (77/51 - 120/61)  BP(mean): 80 (24 Mar 2020 08:00) (59 - 85)  RR: 21 (24 Mar 2020 08:00) (15 - 39)  SpO2: 100% (24 Mar 2020 08:00) (95% - 100%)    alert, awake, verbal  follows commands  S1S2 irreg rate/rhythm  diminished sounds at left base  soft abdomen, non distended  moves all 4 ext, no edema    WBC 7.7, Hg 9.1, Plt 204  INR 1.5  BUN 15, Cr 0.9    CXR - left pleural effusion    a/p:    AD s/p CABG x4  A fib/flutter  Acute post thoracotomy pain    PO ASA 81, PO plavix 75 qd, and PO eliquis 2.5 bid  stop dig  PO amio, PO statin  lopressor 12.5 tid  lasix 20 mg x 1 IV. d/c home on lasix PO  PO diet with glycemic control  discussed on rounds with CT surgery team

## 2020-03-24 NOTE — PROGRESS NOTE ADULT - REASON FOR ADMISSION
Debilitation
cabg x 4
CAD
Coronary artery bypass
Coronary artery disease s/p CABG
STEMI
Cardiac tamponade, shock
s/p CABG

## 2020-03-24 NOTE — PROGRESS NOTE ADULT - ASSESSMENT
This is a 85y Male with h/o pancreatic and liver cancer in remission since 40 years ago, recent paroxysmal AF, STEMI s/p RCA stent(2/9/2020) who presents with TvCAD, now S/p off pump CABG x 4 on 3/16. Post op with AF with RVR, received Amio bolus x2 f/b drip, Procainamide, unsuccessful DCCV x2. Underwent 3rd DCCV on  3/23, now SR.    Impression:  Triple vessel CAD s/p off pump CABG x 4, now POD#8  ICM EF 31%  AF RVR 140s s/p DCCV 3/23, now SR    Plan:  - Wearable defibrillator placed  - Cont Eliquis  - Cont amiodarone  - Agree with discontinuing digoxin  - Cont metoprolol  - F/U with Dr. Connors in 3 months to repeat echo and re-assess need for ICD if EF does not improve    Discussed with EP attending

## 2020-03-24 NOTE — DISCHARGE NOTE PROVIDER - CARE PROVIDER_API CALL
Prateek Garcia)  Surgery; Thoracic and Cardiac Surgery  [address] [place] [address], [address]  [place] [address]  Phone: [phone]  Fax: [phone]  Scheduled Appointment: 03/31/2020 01:00 PM    Florence Rouse)  Cardiology; Interventional Cardiology  [address] [place] [address]  Phone: [phone]  Fax: [phone]  Follow Up Time: 2 weeks

## 2020-03-27 NOTE — CDI QUERY NOTE - NSCDIOTHERTXTBX_GEN_ALL_CORE_HH
Pt. w/ hx. of MI, CAD here for CABG  On 3/16 CABG performed,  Pt. received Fentanyl and Midolazam in OR.    On 3/17 progress note states:  "Nurse call that patient is unresponsive. On exam I found him awake with open eye, but not answering any questions or following commands. This event was proceeded by crying spell. corneals are present, reflexes are equal and present. extremities not flacid and protect face o hand drop. Try to call neurology - awaiting respond. CT of head ordered - urgent. now after 30 minutes patient is back to his baseline."    Neuro Consult states:  "Pt 84 yo LH male POD#1 s/p CABG found to be confused.  Head CT negative for acute findings."    EEG report states:  "Abnormal due to the presence of: focal slowing as above, generalized slowing as above"    Please document etiology, if known, with the patient's altered mental status:     -   Toxic Encephalopathy related to anesthesia     -   Seizure     -   Other (please specify)     -   Unable to determine    Thank you for your time and assistance in this matter,

## 2020-03-31 ENCOUNTER — APPOINTMENT (OUTPATIENT)
Dept: CARDIOTHORACIC SURGERY | Facility: CLINIC | Age: 85
End: 2020-03-31
Payer: MEDICARE

## 2020-03-31 VITALS
TEMPERATURE: 97.3 F | HEIGHT: 68 IN | OXYGEN SATURATION: 96 % | DIASTOLIC BLOOD PRESSURE: 66 MMHG | RESPIRATION RATE: 14 BRPM | HEART RATE: 67 BPM | SYSTOLIC BLOOD PRESSURE: 148 MMHG

## 2020-03-31 DIAGNOSIS — Z95.1 PRESENCE OF AORTOCORONARY BYPASS GRAFT: ICD-10-CM

## 2020-03-31 PROCEDURE — 99024 POSTOP FOLLOW-UP VISIT: CPT

## 2020-03-31 RX ORDER — POTASSIUM CHLORIDE 750 MG/1
10 TABLET, FILM COATED, EXTENDED RELEASE ORAL
Refills: 0 | Status: ACTIVE | COMMUNITY

## 2020-03-31 RX ORDER — ASPIRIN 81 MG
81 TABLET, DELAYED RELEASE (ENTERIC COATED) ORAL
Refills: 0 | Status: ACTIVE | COMMUNITY

## 2020-03-31 RX ORDER — METOPROLOL SUCCINATE 25 MG/1
25 TABLET, EXTENDED RELEASE ORAL
Refills: 0 | Status: DISCONTINUED | COMMUNITY
End: 2020-03-31

## 2020-03-31 RX ORDER — ACETAMINOPHEN 325 MG/1
325 TABLET ORAL
Refills: 0 | Status: ACTIVE | COMMUNITY

## 2020-03-31 RX ORDER — LISINOPRIL 2.5 MG/1
2.5 TABLET ORAL
Refills: 0 | Status: DISCONTINUED | COMMUNITY
End: 2020-03-31

## 2020-03-31 RX ORDER — CLOPIDOGREL BISULFATE 75 MG/1
75 TABLET, FILM COATED ORAL
Refills: 0 | Status: DISCONTINUED | COMMUNITY
End: 2020-03-31

## 2020-03-31 RX ORDER — OXYCODONE AND ACETAMINOPHEN 5; 325 MG/1; MG/1
5-325 TABLET ORAL
Refills: 0 | Status: ACTIVE | COMMUNITY

## 2020-03-31 RX ORDER — CLOPIDOGREL 75 MG/1
75 TABLET, FILM COATED ORAL
Refills: 0 | Status: ACTIVE | COMMUNITY

## 2020-03-31 RX ORDER — ASPIRIN 81 MG/1
81 TABLET, CHEWABLE ORAL
Refills: 0 | Status: DISCONTINUED | COMMUNITY
End: 2020-03-31

## 2020-03-31 RX ORDER — APIXABAN 2.5 MG/1
2.5 TABLET, FILM COATED ORAL
Refills: 0 | Status: ACTIVE | COMMUNITY

## 2020-03-31 RX ORDER — AMIODARONE HYDROCHLORIDE 200 MG/1
200 TABLET ORAL
Refills: 0 | Status: ACTIVE | COMMUNITY

## 2020-03-31 RX ORDER — METOPROLOL SUCCINATE 25 MG/1
25 TABLET, EXTENDED RELEASE ORAL
Refills: 0 | Status: ACTIVE | COMMUNITY

## 2020-04-02 DIAGNOSIS — I97.89 OTHER POSTPROCEDURAL COMPLICATIONS AND DISORDERS OF THE CIRCULATORY SYSTEM, NOT ELSEWHERE CLASSIFIED: ICD-10-CM

## 2020-04-02 DIAGNOSIS — Z79.82 LONG TERM (CURRENT) USE OF ASPIRIN: ICD-10-CM

## 2020-04-02 DIAGNOSIS — I11.0 HYPERTENSIVE HEART DISEASE WITH HEART FAILURE: ICD-10-CM

## 2020-04-02 DIAGNOSIS — I48.0 PAROXYSMAL ATRIAL FIBRILLATION: ICD-10-CM

## 2020-04-02 DIAGNOSIS — I25.5 ISCHEMIC CARDIOMYOPATHY: ICD-10-CM

## 2020-04-02 DIAGNOSIS — Z85.05 PERSONAL HISTORY OF MALIGNANT NEOPLASM OF LIVER: ICD-10-CM

## 2020-04-02 DIAGNOSIS — D62 ACUTE POSTHEMORRHAGIC ANEMIA: ICD-10-CM
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
## 2020-04-02 DIAGNOSIS — E78.5 HYPERLIPIDEMIA, UNSPECIFIED: ICD-10-CM
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
## 2020-04-02 DIAGNOSIS — I25.10 ATHEROSCLEROTIC HEART DISEASE OF NATIVE CORONARY ARTERY WITHOUT ANGINA PECTORIS: ICD-10-CM

## 2020-04-02 DIAGNOSIS — L60.0 INGROWING NAIL: ICD-10-CM

## 2020-04-02 DIAGNOSIS — R00.0 TACHYCARDIA, UNSPECIFIED: ICD-10-CM

## 2020-04-02 DIAGNOSIS — G89.12 ACUTE POST-THORACOTOMY PAIN: ICD-10-CM

## 2020-04-02 DIAGNOSIS — R41.0 DISORIENTATION, UNSPECIFIED: ICD-10-CM

## 2020-04-02 DIAGNOSIS — R73.9 HYPERGLYCEMIA, UNSPECIFIED: ICD-10-CM

## 2020-04-02 DIAGNOSIS — B35.1 TINEA UNGUIUM: ICD-10-CM

## 2020-04-02 DIAGNOSIS — I48.92 UNSPECIFIED ATRIAL FLUTTER: ICD-10-CM

## 2020-04-02 DIAGNOSIS — T82.198A OTHER MECHANICAL COMPLICATION OF OTHER CARDIAC ELECTRONIC DEVICE, INITIAL ENCOUNTER: ICD-10-CM

## 2020-04-02 DIAGNOSIS — Z87.891 PERSONAL HISTORY OF NICOTINE DEPENDENCE: ICD-10-CM

## 2020-04-02 DIAGNOSIS — Z79.02 LONG TERM (CURRENT) USE OF ANTITHROMBOTICS/ANTIPLATELETS: ICD-10-CM

## 2020-04-02 DIAGNOSIS — I25.2 OLD MYOCARDIAL INFARCTION: ICD-10-CM

## 2020-04-02 DIAGNOSIS — Z95.5 PRESENCE OF CORONARY ANGIOPLASTY IMPLANT AND GRAFT: ICD-10-CM

## 2020-04-02 DIAGNOSIS — I95.81 POSTPROCEDURAL HYPOTENSION: ICD-10-CM

## 2020-04-02 DIAGNOSIS — I50.22 CHRONIC SYSTOLIC (CONGESTIVE) HEART FAILURE: ICD-10-CM

## 2020-04-02 DIAGNOSIS — Z85.07 PERSONAL HISTORY OF MALIGNANT NEOPLASM OF PANCREAS: ICD-10-CM

## 2020-04-02 DIAGNOSIS — D69.6 THROMBOCYTOPENIA, UNSPECIFIED: ICD-10-CM

## 2020-06-22 ENCOUNTER — APPOINTMENT (OUTPATIENT)
Dept: CARDIOLOGY | Facility: CLINIC | Age: 85
End: 2020-06-22

## 2021-01-01 NOTE — PRE-ANESTHESIA EVALUATION ADULT - NSANTHPMHFT_GEN_ALL_CORE
86 y/o M w/ pmhx of HTN, HLD, CAD, Prostate CA for on pump CABG
84 y/o M w/ pmhx of HTN, HLD, CAD, Prostate CA who is POD 1 from CABG. Patient is hypotensive on two pressors and is in and out of a-fib with RVR. Primary team is attempting medical management but wishes to cardiovert if they fail to do so.
labor/delivery

## 2021-02-12 NOTE — ED ADULT TRIAGE NOTE - AS O2 DELIVERY
----- Message from Neymar Wolff MD sent at 8/2/2018  8:37 AM CDT -----  Cell carcinoma in situ. Return to clinic to discuss. Hopefully next few weeks to a month   We can switch to Tresiba 100 units/mL. Same dose of 24 units daily.        room air

## 2022-11-09 NOTE — PRE-ANESTHESIA EVALUATION ADULT - WEIGHT IN LBS
196.8
196.8
Skyrizi Counseling: I discussed with the patient the risks of risankizumab-rzaa including but not limited to immunosuppression, and serious infections.  The patient understands that monitoring is required including a PPD at baseline and must alert us or the primary physician if symptoms of infection or other concerning signs are noted.

## 2023-08-17 NOTE — CHART NOTE - NSCHARTNOTESELECT_GEN_ALL_CORE
Code STEMI Rhomboid Transposition Flap Text: The defect edges were debeveled with a #15 scalpel blade.  Given the location of the defect and the proximity to free margins a rhomboid transposition flap was deemed most appropriate.  Using a sterile surgical marker, an appropriate rhomboid flap was drawn incorporating the defect.    The area thus outlined was incised deep to adipose tissue with a #15 scalpel blade.  The skin margins were undermined to an appropriate distance in all directions utilizing iris scissors. Following this, the designed flap was advanced and carried over into the primary defect and sutured into place. The secondary defect was also sutured into place.

## 2024-02-13 NOTE — CHART NOTE - NSCHARTNOTESELECT_GEN_ALL_CORE
Pt remains on Nimodipine Day 22 and max assist with txs. Not medically appropriate for transfer of care.   Downgrade/Transfer Note

## 2024-03-18 NOTE — ED PROCEDURE NOTE - NS ED PROCEDURE ASSISTED BY
Home monitor INR results:  Edmar's INR today is therapeutic at 2.0 on 55 mg of Warfarin weekly.  Reason for anticoagulation: atrial fibrillation  INR Range: 2.0-3.0  Last INR: 2.3 on 3/11/24    Patient reports no abnormal findings related to being on Warfarin. Reports no dietary changes. Medication changes: Jardiance dose was increased; no interaction with Warfarin per [employer] drug reference. Verified correct previous Warfarin dosing. Patient was advised to take the following Warfarin dosing: continue taking 10mg on Friday and 7.5mg x6 days; totaling 55mg weekly. Date of next INR: in 1 week on Monday 3/25; staff message done.  No further questions/concerns.    Onsite billing provider is  Armond Rodriguez PA-C .    Advised to contact the clinic at [phone] with the following:   Medication changes: New, dose change, advised to stop, especially antibiotics/steroids  Procedures planned: Surgical/dental/injections, or if you are advised to hold your Warfarin  Bruising/bleeding  ER visits/Urgent Care visits    
Supervision was available
Supervision was available

## 2024-11-07 NOTE — PROGRESS NOTE ADULT - SUBJECTIVE AND OBJECTIVE BOX
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   Current Complains:  LOKESH HUNT has no new complains  Allergies    No Known Allergies    Intolerances      OBJECTIVE:  Vitals last 24 hrs  T(C): 36.6 (03-21-20 @ 08:00), Max: 36.7 (03-21-20 @ 04:00)  T(F): 97.8 (03-21-20 @ 08:00), Max: 98 (03-21-20 @ 04:00)  HR: 88 (03-21-20 @ 08:00) (83 - 128)  BP: 87/54 (03-21-20 @ 08:00) (84/57 - 135/77)  ABP: 116/55 (03-20-20 @ 17:00) (109/51 - 126/55)  ABP(mean): 74 (03-20-20 @ 17:00) (69 - 88)  RR: 18 (03-21-20 @ 08:00) (13 - 26)  SpO2: 100% (03-21-20 @ 08:00) (51% - 100%)    03-20-20 @ 07:01  -  03-21-20 @ 07:00  --------------------------------------------------------  IN:    amiodarone Infusion: 33.4 mL    IV PiggyBack: 200 mL    Oral Fluid: 540 mL  Total IN: 773.4 mL    OUT:    Voided: 850 mL  Total OUT: 850 mL    Total NET: -76.6 mL          CAPILLARY BLOOD GLUCOSE      POCT Blood Glucose.: 84 mg/dL (21 Mar 2020 11:06)  POCT Blood Glucose.: 103 mg/dL (21 Mar 2020 06:44)  POCT Blood Glucose.: 117 mg/dL (20 Mar 2020 21:33)  POCT Blood Glucose.: 101 mg/dL (20 Mar 2020 16:15)    LABS:  ABG - ( 20 Mar 2020 16:23 )  pH, Arterial: 7.46  pH, Blood: x     /  pCO2: 34    /  pO2: 90    / HCO3: 24    / Base Excess: 0.9   /  SaO2: 98              Blood Gas Arterial, Lactate: 1.5 mmoL/L (03-20-20 @ 16:23)                          8.5    8.78  )-----------( 134      ( 21 Mar 2020 02:00 )             25.1     Hemoglobin: 8.5 g/dL (03-21 @ 02:00)  Hemoglobin: 9.6 g/dL (03-20 @ 13:52)  Hemoglobin: 8.6 g/dL (03-20 @ 02:00)  Hemoglobin: 8.7 g/dL (03-19 @ 02:24)    03-21    139  |  101  |  25<H>  ----------------------------<  84  3.8   |  27  |  0.9    Ca    8.2<L>      21 Mar 2020 02:00  Mg     2.9     03-21    TPro  6.0  /  Alb  4.0  /  TBili  1.9<H>  /  DBili  x   /  AST  52<H>  /  ALT  41  /  AlkPhos  82  03-20    Creatinine, Serum: 0.9 mg/dL (03-21 @ 02:00)  Creatinine, Serum: 0.9 mg/dL (03-20 @ 13:52)  Creatinine, Serum: 0.9 mg/dL (03-20 @ 02:00)  Creatinine, Serum: 1.0 mg/dL (03-19 @ 13:50)  Creatinine, Serum: 0.9 mg/dL (03-19 @ 02:24)  Creatinine, Serum: 0.7 mg/dL (03-18 @ 01:30)  Creatinine, Serum: 0.9 mg/dL (03-17 @ 16:14)    PT/INR - ( 21 Mar 2020 02:00 )   PT: 18.50 sec;   INR: 1.61 ratio               HOSPITAL MEDICATIONS:  MEDICATIONS  (STANDING):  aMIOdarone    Tablet 400 milliGRAM(s) Oral every 12 hours  atorvastatin 80 milliGRAM(s) Oral at bedtime  chlorhexidine 4% Liquid 1 Application(s) Topical daily  clopidogrel Tablet 75 milliGRAM(s) Oral daily  dextrose 5%. 1000 milliLiter(s) (50 mL/Hr) IV Continuous <Continuous>  dextrose 50% Injectable 50 milliLiter(s) IV Push every 15 minutes  dextrose 50% Injectable 25 milliLiter(s) IV Push every 15 minutes  digoxin     Tablet 0.125 milliGRAM(s) Oral daily  enoxaparin Injectable 40 milliGRAM(s) SubCutaneous two times a day  famotidine    Tablet 20 milliGRAM(s) Oral two times a day  guaiFENesin  milliGRAM(s) Oral every 12 hours  insulin glargine Injectable (LANTUS) 10 Unit(s) SubCutaneous every morning  insulin lispro (HumaLOG) corrective regimen sliding scale   SubCutaneous three times a day before meals  insulin lispro Injectable (HumaLOG) 3 Unit(s) SubCutaneous three times a day before meals  ipratropium    for Nebulization 500 MICROGram(s) Nebulizer every 6 hours  magnesium sulfate  IVPB 1 Gram(s) IV Intermittent every 12 hours  metoprolol tartrate 12.5 milliGRAM(s) Oral every 8 hours  polyethylene glycol 3350 17 Gram(s) Oral daily  senna 2 Tablet(s) Oral at bedtime  sodium chloride 0.9%. 1000 milliLiter(s) (20 mL/Hr) IV Continuous <Continuous>  warfarin 2 milliGRAM(s) Oral once    MEDICATIONS  (PRN):  acetaminophen   Tablet .. 325 milliGRAM(s) Oral every 4 hours PRN Mild Pain (1 - 3)  dextrose 40% Gel 15 Gram(s) Oral once PRN Blood Glucose LESS THAN 70 milliGRAM(s)/deciliter  glucagon  Injectable 1 milliGRAM(s) IntraMuscular once PRN Glucose LESS THAN 70 milligrams/deciliter      REVIEW OF SYSTEMS:  CONSTITUTIONAL: [X] all negative; [ ] weakness, [ ] fevers, [ ] chills  EYES/ENT: [X] all negative; [ ] visual changes, [ ] vertigo, [ ] throat pain, [ ] eye pain  NECK: [X] all negative; [ ] pain, [ ] stiffness  RESPIRATORY: [ ] all negative, [x ] cough, [ ] wheezing, [ ] hemoptysis, [x ] shortness of breath, [  ] chest pain  CARDIOVASCULAR: [ ] all negative; [ ] anginal chest pain, [ ] palpitations, [ ] orthopnea  GASTROINTESTINAL: [X] all negative; [ ]abdominal pain, [ ] nausea, [ ] vomiting, [ ] hematemesis, [ ] diarrhea, [ ] constipation, [ ] melena, [ ] hematochezia.  GENITOURINARY: [X] all negative; [ ] dysuria, [ ] frequency, [ ] hematuria  NEUROLOGICAL: [X] all negative; [ ] numbness, [ ] weakness, [ ] paresthesias  MUSCULOSKELETAL: [X] all negative, [ ] joint pain, [ ] joint swelling, [ ] joint redness, [ ] bone pain  SKIN: [X] all negative; [ ] itching, [ ] burning, [ ] rashes, [ ] lesions   All other review of systems is negative unless indicated above.    [  ] Unable to assess ROS because     PHYSICAL EXAM:          CONSTITUTIONAL: Well-developed; well-nourished; in no acute distress.   	SKIN: warm, dry, no rashes or lesions  	HEENT: Atraumatic. Normocephalic. PERRL. Moist membranes, no conjunctival injection, sclera clear  	NECK: Supple; non tender.  No JVD. No lymphadenopathy.  	CARD: Normal S1, S2. Rate and Rhythm are irregular. No murmurs.  	RESP: decreased air entry on the left base , no wheezes, + rales no rhonchi.  	ABD: Soft, not tender, not distended, no CVA tendernass, no rebound no guarding, bowel sounds present  	EXT: Normal ROM.  No clubbing, no cyanosis, + pedal edema, no calf pain b/l, Peripheral pulses intact.  	LYMPH: No acute cervical adenopathy.  	NEURO: Alert, awake, motor 5/5 R, 5/5 L, sensation intact bilat, CN 2-12 intact,          PSYCH: Cooperative, appropriate. Alert & oriented x 3    RADIOLOGY:  X Reviewed and interpreted by me  CxR from 03-21-20 shows moderate congestion, no pneumothorax, left effusion, no cardiomegaly,       ECG:  X Reviewed and interpreted by mary PEREIRA. Fib 88, QTc - 491 No

## 2025-07-23 NOTE — H&P PST ADULT - EXTREMITIES
Anesthesia Post-op Note    Patient: Elliot Strong  Procedure(s) Performed: RIGHT EXTRACTION, CATARACT, WITH IOL INSERTION (Right: Eye)  Anesthesia type: MAC    Vitals Value Taken Time   Temp 36.7 °C (98.1 °F) 07/23/25 0809   Pulse 55 07/23/25 0809   Resp 16 07/23/25 0815   SpO2 97 % 07/23/25 0809   /79 07/23/25 0809         Patient Location: Phase II  Post-op Vital Signs:stable  Level of Consciousness: participates in exam, awake and oriented  Respiratory Status: spontaneous ventilation and room air  Cardiovascular stable  Hydration: euvolemic  Pain Management: well controlled  Handoff: Handoff to receiving clinician was performed and questions were answered  Vomiting: none  Nausea: None  Airway Patency:patent  Post-op Assessment: awake, alert, appropriately conversant, or baseline, no complications, patient tolerated procedure well, dentition, mouth, tongue, and oropharynx unchanged  and moving all extremities    No notable events documented.                       detailed exam